# Patient Record
Sex: FEMALE | Race: BLACK OR AFRICAN AMERICAN | NOT HISPANIC OR LATINO | Employment: OTHER | URBAN - METROPOLITAN AREA
[De-identification: names, ages, dates, MRNs, and addresses within clinical notes are randomized per-mention and may not be internally consistent; named-entity substitution may affect disease eponyms.]

---

## 2019-10-09 ENCOUNTER — LAB VISIT (OUTPATIENT)
Dept: LAB | Facility: HOSPITAL | Age: 74
End: 2019-10-09
Attending: FAMILY MEDICINE
Payer: MEDICARE

## 2019-10-09 ENCOUNTER — OFFICE VISIT (OUTPATIENT)
Dept: FAMILY MEDICINE | Facility: CLINIC | Age: 74
End: 2019-10-09
Payer: MEDICARE

## 2019-10-09 VITALS
WEIGHT: 142.75 LBS | BODY MASS INDEX: 19.33 KG/M2 | TEMPERATURE: 98 F | SYSTOLIC BLOOD PRESSURE: 134 MMHG | HEART RATE: 81 BPM | DIASTOLIC BLOOD PRESSURE: 60 MMHG | OXYGEN SATURATION: 98 % | HEIGHT: 72 IN

## 2019-10-09 DIAGNOSIS — R79.9 ABNORMAL FINDING OF BLOOD CHEMISTRY, UNSPECIFIED: ICD-10-CM

## 2019-10-09 DIAGNOSIS — Z78.0 POST-MENOPAUSAL: ICD-10-CM

## 2019-10-09 DIAGNOSIS — Z85.09 HISTORY OF GALLBLADDER CANCER: Primary | ICD-10-CM

## 2019-10-09 DIAGNOSIS — Z23 NEED FOR IMMUNIZATION AGAINST INFLUENZA: ICD-10-CM

## 2019-10-09 DIAGNOSIS — Z72.0 TOBACCO ABUSE: ICD-10-CM

## 2019-10-09 DIAGNOSIS — Z12.11 ENCOUNTER FOR SCREENING FOR MALIGNANT NEOPLASM OF COLON: ICD-10-CM

## 2019-10-09 DIAGNOSIS — Z12.31 ENCOUNTER FOR SCREENING MAMMOGRAM FOR BREAST CANCER: ICD-10-CM

## 2019-10-09 DIAGNOSIS — K21.9 GASTROESOPHAGEAL REFLUX DISEASE, ESOPHAGITIS PRESENCE NOT SPECIFIED: ICD-10-CM

## 2019-10-09 DIAGNOSIS — I10 ESSENTIAL HYPERTENSION, BENIGN: ICD-10-CM

## 2019-10-09 LAB
ALBUMIN SERPL BCP-MCNC: 3.3 G/DL (ref 3.5–5.2)
ALP SERPL-CCNC: 1388 U/L (ref 55–135)
ALT SERPL W/O P-5'-P-CCNC: 383 U/L (ref 10–44)
ANION GAP SERPL CALC-SCNC: 14 MMOL/L (ref 8–16)
AST SERPL-CCNC: 340 U/L (ref 10–40)
BASOPHILS # BLD AUTO: 0.06 K/UL (ref 0–0.2)
BASOPHILS NFR BLD: 0.9 % (ref 0–1.9)
BILIRUB SERPL-MCNC: 10.3 MG/DL (ref 0.1–1)
BUN SERPL-MCNC: 10 MG/DL (ref 8–23)
CALCIUM SERPL-MCNC: 9.7 MG/DL (ref 8.7–10.5)
CHLORIDE SERPL-SCNC: 101 MMOL/L (ref 95–110)
CHOLEST SERPL-MCNC: 282 MG/DL (ref 120–199)
CHOLEST/HDLC SERPL: 5.8 {RATIO} (ref 2–5)
CO2 SERPL-SCNC: 22 MMOL/L (ref 23–29)
CREAT SERPL-MCNC: 0.7 MG/DL (ref 0.5–1.4)
DIFFERENTIAL METHOD: ABNORMAL
EOSINOPHIL # BLD AUTO: 0.1 K/UL (ref 0–0.5)
EOSINOPHIL NFR BLD: 1.3 % (ref 0–8)
ERYTHROCYTE [DISTWIDTH] IN BLOOD BY AUTOMATED COUNT: 15.3 % (ref 11.5–14.5)
EST. GFR  (AFRICAN AMERICAN): >60 ML/MIN/1.73 M^2
EST. GFR  (NON AFRICAN AMERICAN): >60 ML/MIN/1.73 M^2
ESTIMATED AVG GLUCOSE: 91 MG/DL (ref 68–131)
GLUCOSE SERPL-MCNC: 88 MG/DL (ref 70–110)
HBA1C MFR BLD HPLC: 4.8 % (ref 4–5.6)
HCT VFR BLD AUTO: 37.8 % (ref 37–48.5)
HDLC SERPL-MCNC: 49 MG/DL (ref 40–75)
HDLC SERPL: 17.4 % (ref 20–50)
HGB BLD-MCNC: 12.5 G/DL (ref 12–16)
IMM GRANULOCYTES # BLD AUTO: 0.03 K/UL (ref 0–0.04)
IMM GRANULOCYTES NFR BLD AUTO: 0.4 % (ref 0–0.5)
LDLC SERPL CALC-MCNC: 210.8 MG/DL (ref 63–159)
LYMPHOCYTES # BLD AUTO: 1.1 K/UL (ref 1–4.8)
LYMPHOCYTES NFR BLD: 16.2 % (ref 18–48)
MCH RBC QN AUTO: 30.9 PG (ref 27–31)
MCHC RBC AUTO-ENTMCNC: 33.1 G/DL (ref 32–36)
MCV RBC AUTO: 94 FL (ref 82–98)
MONOCYTES # BLD AUTO: 0.7 K/UL (ref 0.3–1)
MONOCYTES NFR BLD: 10.7 % (ref 4–15)
NEUTROPHILS # BLD AUTO: 4.8 K/UL (ref 1.8–7.7)
NEUTROPHILS NFR BLD: 70.5 % (ref 38–73)
NONHDLC SERPL-MCNC: 233 MG/DL
NRBC BLD-RTO: 0 /100 WBC
PLATELET # BLD AUTO: 289 K/UL (ref 150–350)
PMV BLD AUTO: 11.2 FL (ref 9.2–12.9)
POTASSIUM SERPL-SCNC: 3.9 MMOL/L (ref 3.5–5.1)
PROT SERPL-MCNC: 7.3 G/DL (ref 6–8.4)
RBC # BLD AUTO: 4.04 M/UL (ref 4–5.4)
SODIUM SERPL-SCNC: 137 MMOL/L (ref 136–145)
T4 FREE SERPL-MCNC: 1.16 NG/DL (ref 0.71–1.51)
TRIGL SERPL-MCNC: 111 MG/DL (ref 30–150)
TSH SERPL DL<=0.005 MIU/L-ACNC: 1.67 UIU/ML (ref 0.4–4)
WBC # BLD AUTO: 6.85 K/UL (ref 3.9–12.7)

## 2019-10-09 PROCEDURE — 80053 COMPREHEN METABOLIC PANEL: CPT

## 2019-10-09 PROCEDURE — 99999 PR PBB SHADOW E&M-NEW PATIENT-LVL IV: ICD-10-PCS | Mod: PBBFAC,,, | Performed by: FAMILY MEDICINE

## 2019-10-09 PROCEDURE — 1101F PT FALLS ASSESS-DOCD LE1/YR: CPT | Mod: CPTII,S$GLB,, | Performed by: FAMILY MEDICINE

## 2019-10-09 PROCEDURE — 90662 IIV NO PRSV INCREASED AG IM: CPT | Mod: S$GLB,,, | Performed by: FAMILY MEDICINE

## 2019-10-09 PROCEDURE — 99203 OFFICE O/P NEW LOW 30 MIN: CPT | Mod: 25,S$GLB,, | Performed by: FAMILY MEDICINE

## 2019-10-09 PROCEDURE — 84443 ASSAY THYROID STIM HORMONE: CPT

## 2019-10-09 PROCEDURE — 36415 COLL VENOUS BLD VENIPUNCTURE: CPT | Mod: PO

## 2019-10-09 PROCEDURE — 90662 FLU VACCINE - HIGH DOSE (65+) PRESERVATIVE FREE IM: ICD-10-PCS | Mod: S$GLB,,, | Performed by: FAMILY MEDICINE

## 2019-10-09 PROCEDURE — G0008 ADMIN INFLUENZA VIRUS VAC: HCPCS | Mod: S$GLB,,, | Performed by: FAMILY MEDICINE

## 2019-10-09 PROCEDURE — 1101F PR PT FALLS ASSESS DOC 0-1 FALLS W/OUT INJ PAST YR: ICD-10-PCS | Mod: CPTII,S$GLB,, | Performed by: FAMILY MEDICINE

## 2019-10-09 PROCEDURE — G0008 FLU VACCINE - HIGH DOSE (65+) PRESERVATIVE FREE IM: ICD-10-PCS | Mod: S$GLB,,, | Performed by: FAMILY MEDICINE

## 2019-10-09 PROCEDURE — 85025 COMPLETE CBC W/AUTO DIFF WBC: CPT

## 2019-10-09 PROCEDURE — 84439 ASSAY OF FREE THYROXINE: CPT

## 2019-10-09 PROCEDURE — 80061 LIPID PANEL: CPT

## 2019-10-09 PROCEDURE — 99203 PR OFFICE/OUTPT VISIT, NEW, LEVL III, 30-44 MIN: ICD-10-PCS | Mod: 25,S$GLB,, | Performed by: FAMILY MEDICINE

## 2019-10-09 PROCEDURE — 83036 HEMOGLOBIN GLYCOSYLATED A1C: CPT

## 2019-10-09 PROCEDURE — 99999 PR PBB SHADOW E&M-NEW PATIENT-LVL IV: CPT | Mod: PBBFAC,,, | Performed by: FAMILY MEDICINE

## 2019-10-09 RX ORDER — AMLODIPINE BESYLATE 10 MG/1
10 TABLET ORAL DAILY
Qty: 90 TABLET | Refills: 3 | Status: ON HOLD | OUTPATIENT
Start: 2019-10-09 | End: 2020-02-27 | Stop reason: HOSPADM

## 2019-10-09 RX ORDER — LISINOPRIL 20 MG/1
20 TABLET ORAL DAILY
Qty: 90 TABLET | Refills: 3 | Status: ON HOLD | OUTPATIENT
Start: 2019-10-09 | End: 2019-12-24

## 2019-10-09 RX ORDER — PHENOL/SODIUM PHENOLATE
1 AEROSOL, SPRAY (ML) MUCOUS MEMBRANE DAILY
Qty: 30 EACH | Refills: 3 | Status: ON HOLD | OUTPATIENT
Start: 2019-10-09 | End: 2019-10-22 | Stop reason: HOSPADM

## 2019-10-09 NOTE — PROGRESS NOTES
[facility]  Progress Note    SUBJECTIVE:     Chief Complaint   Patient presents with    Establish Care    Gastroesophageal Reflux    Medication Refill       HPI   Angelica Tomlinson  is a 74 y.o. female here to establish care and for f/u of her chronic conditions. Has heartburn issues. Which she takes omeprazole which helps. Blood pressure has been well controlled at home. Takes meds as directed. Patient has no other new complaints/problems at this time.      Review of patient's allergies indicates:  No Known Allergies    Past Medical History:   Diagnosis Date    Constipation     Cyst of breast, left, benign solitary     Diverticulosis     Helicobacter pylori gastritis     Hiatal hernia      Past Surgical History:   Procedure Laterality Date    APPENDECTOMY      colon polyps       Family History   Problem Relation Age of Onset    Pneumonia Mother      Social History     Tobacco Use    Smoking status: Light Tobacco Smoker     Packs/day: 0.50     Types: Cigarettes     Start date: 1/1/1977   Substance Use Topics    Alcohol use: No    Drug use: No        Review of Systems   Constitutional: Negative for chills, fever and malaise/fatigue.   HENT: Negative.    Respiratory: Negative.  Negative for cough and shortness of breath.    Cardiovascular: Negative.  Negative for chest pain.   Gastrointestinal: Negative.  Negative for abdominal pain, nausea and vomiting.   Genitourinary: Negative.    Neurological: Negative for weakness and headaches.   All other systems reviewed and are negative.    OBJECTIVE:     Vitals:    10/09/19 1003   BP: 134/60   Pulse: 81   Temp: 98 °F (36.7 °C)     Body mass index is 19.36 kg/m².    Physical Exam   Constitutional: She is oriented to person, place, and time and well-developed, well-nourished, and in no distress. No distress.   HENT:   Head: Normocephalic and atraumatic.   Nose: Nose normal.   Eyes: Conjunctivae and EOM are normal.   Cardiovascular: Normal rate,  regular rhythm and normal heart sounds. Exam reveals no gallop and no friction rub.   No murmur heard.  Pulmonary/Chest: Effort normal and breath sounds normal. No respiratory distress. She has no wheezes. She has no rales. She exhibits no tenderness.   Abdominal: Soft. Bowel sounds are normal. She exhibits no distension. There is no tenderness. There is no rebound.   Neurological: She is alert and oriented to person, place, and time.   Skin: Skin is warm. She is not diaphoretic.       Old records were reviewed. Labs and/or images were independently reviewed.    ASSESSMENT     1. History of gallbladder cancer    2. Gastroesophageal reflux disease, esophagitis presence not specified    3. Encounter for screening for malignant neoplasm of colon    4. Post-menopausal    5. Encounter for screening mammogram for breast cancer    6. Need for immunization against influenza    7. Tobacco abuse    8. Essential hypertension, benign    9. Abnormal finding of blood chemistry, unspecified         PLAN:     History of gallbladder cancer  -     Ambulatory referral to Hematology / Oncology for further evaluation and treatment.    Gastroesophageal reflux disease, esophagitis presence not specified  -     omeprazole 20 mg TbEC; Take 1 tablet by mouth once daily.  Dispense: 30 each; Refill: 3  -     Stable. Continue current regimen.    Encounter for screening for malignant neoplasm of colon  -     Case request GI: COLONOSCOPY    Post-menopausal  -     DXA Bone Density Spine And Hip; Future; Expected date: 10/09/2019    Encounter for screening mammogram for breast cancer  -     Mammo Digital Screening Bilat; Future; Expected date: 10/09/2019    Need for immunization against influenza  -     Influenza - High Dose (65+) (PF) (IM)    Tobacco abuse   -      Will try to quit on her own.     Essential hypertension, benign  -     CBC auto differential; Future  -     Comprehensive metabolic panel; Future  -     Hemoglobin A1c; Future  -      Lipid panel; Future  -     TSH; Future  -     T4, free; Future  -     lisinopril (PRINIVIL,ZESTRIL) 20 MG tablet; Take 1 tablet (20 mg total) by mouth once daily.  Dispense: 90 tablet; Refill: 3  -     amLODIPine (NORVASC) 10 MG tablet; Take 1 tablet (10 mg total) by mouth once daily.  Dispense: 90 tablet; Refill: 3  -     Stable. Continue current regimen.      RTC PRJHON Barroso MD  10/09/2019 5:06 PM

## 2019-10-11 DIAGNOSIS — Z11.59 NEED FOR HEPATITIS C SCREENING TEST: ICD-10-CM

## 2019-10-14 ENCOUNTER — TELEPHONE (OUTPATIENT)
Dept: FAMILY MEDICINE | Facility: CLINIC | Age: 74
End: 2019-10-14

## 2019-10-14 DIAGNOSIS — Z85.09 HISTORY OF GALLBLADDER CANCER: Primary | ICD-10-CM

## 2019-10-14 DIAGNOSIS — R74.8 ELEVATED LIVER ENZYMES: ICD-10-CM

## 2019-10-15 ENCOUNTER — TELEPHONE (OUTPATIENT)
Dept: FAMILY MEDICINE | Facility: CLINIC | Age: 74
End: 2019-10-15

## 2019-10-15 NOTE — TELEPHONE ENCOUNTER
----- Message from Miguel Barroso MD sent at 10/14/2019  4:58 PM CDT -----  Pt with elevated liver enzyme levels. Recommend follow-up with GI, which referral has been placed. cholesterol also elevated, will hold off on statin statin for now. Please decrease fatty food/cholesterol intake.

## 2019-10-16 ENCOUNTER — TELEPHONE (OUTPATIENT)
Dept: HEMATOLOGY/ONCOLOGY | Facility: CLINIC | Age: 74
End: 2019-10-16

## 2019-10-16 ENCOUNTER — TELEPHONE (OUTPATIENT)
Dept: ADMINISTRATIVE | Facility: HOSPITAL | Age: 74
End: 2019-10-16

## 2019-10-16 NOTE — TELEPHONE ENCOUNTER
Pt returned call, appt scheduled for next week. Undecided about changing oncologists, will see  then make a final decision.

## 2019-10-16 NOTE — TELEPHONE ENCOUNTER
----- Message from Miguel Barroso MD sent at 10/9/2019  5:18 PM CDT -----  Regarding: RE: Referral  Yes she is looking to transfer to [facility]. Thanks.  ----- Message -----  From: Nissa Cisneros RN  Sent: 10/9/2019   4:19 PM CDT  To: Miguel Barroso MD  Subject: Referral                                         Good afternoon,  I see that you entered a referral to hem/onc for this pt. She sees  at [facility], last visit was on 8-5-19. According to that note, she doesn't need to follow up for 6 moths. Is the pt wishing to transfer care to [facility]? Just let me know, thanks.  Rain

## 2019-10-16 NOTE — TELEPHONE ENCOUNTER
The soonest appt I could pull at at this moment was this Friday at [facility]. Patient states she was waiting on a call from Dr Barroso and she would call back to schedule after speaking with him.

## 2019-10-16 NOTE — TELEPHONE ENCOUNTER
Spoke with pt states her eyes were not yellow yesterday and today she noticed they are yellow and her urine is a dark brown color, pt states she would like to know what she can do       Please advise

## 2019-10-18 ENCOUNTER — HOSPITAL ENCOUNTER (INPATIENT)
Facility: HOSPITAL | Age: 74
LOS: 3 days | Discharge: HOME | DRG: 445 | End: 2019-10-22
Attending: INTERNAL MEDICINE | Admitting: INTERNAL MEDICINE
Payer: MEDICARE

## 2019-10-18 ENCOUNTER — OFFICE VISIT (OUTPATIENT)
Dept: GASTROENTEROLOGY | Facility: CLINIC | Age: 74
End: 2019-10-18
Payer: MEDICARE

## 2019-10-18 VITALS
SYSTOLIC BLOOD PRESSURE: 133 MMHG | BODY MASS INDEX: 19.23 KG/M2 | DIASTOLIC BLOOD PRESSURE: 63 MMHG | HEART RATE: 73 BPM | WEIGHT: 141.75 LBS

## 2019-10-18 DIAGNOSIS — D49.0 LIVER NEOPLASM: ICD-10-CM

## 2019-10-18 DIAGNOSIS — R17 JAUNDICE: ICD-10-CM

## 2019-10-18 DIAGNOSIS — Z85.09 HISTORY OF GALLBLADDER CANCER: Primary | Chronic | ICD-10-CM

## 2019-10-18 DIAGNOSIS — R74.01 TRANSAMINITIS: ICD-10-CM

## 2019-10-18 DIAGNOSIS — Z01.818 PREOP EXAMINATION: ICD-10-CM

## 2019-10-18 DIAGNOSIS — I10 HYPERTENSION, UNSPECIFIED TYPE: ICD-10-CM

## 2019-10-18 DIAGNOSIS — R97.8 OTHER ABNORMAL TUMOR MARKERS: ICD-10-CM

## 2019-10-18 DIAGNOSIS — K83.1 OBSTRUCTIVE JAUNDICE: ICD-10-CM

## 2019-10-18 PROBLEM — Z72.0 TOBACCO ABUSE: Status: RESOLVED | Noted: 2019-10-09 | Resolved: 2019-10-18

## 2019-10-18 LAB
ALBUMIN SERPL BCP-MCNC: 3.1 G/DL (ref 3.5–5.2)
ALP SERPL-CCNC: 1268 U/L (ref 55–135)
ALT SERPL W/O P-5'-P-CCNC: 150 U/L (ref 10–44)
ANION GAP SERPL CALC-SCNC: 13 MMOL/L (ref 8–16)
AST SERPL-CCNC: 122 U/L (ref 10–40)
BASOPHILS # BLD AUTO: 0.04 K/UL (ref 0–0.2)
BASOPHILS NFR BLD: 0.4 % (ref 0–1.9)
BILIRUB DIRECT SERPL-MCNC: >14 MG/DL (ref 0.1–0.3)
BILIRUB SERPL-MCNC: 20.3 MG/DL (ref 0.1–1)
BUN SERPL-MCNC: 9 MG/DL (ref 8–23)
CALCIUM SERPL-MCNC: 10.4 MG/DL (ref 8.7–10.5)
CHLORIDE SERPL-SCNC: 99 MMOL/L (ref 95–110)
CO2 SERPL-SCNC: 25 MMOL/L (ref 23–29)
CREAT SERPL-MCNC: 0.7 MG/DL (ref 0.5–1.4)
DIFFERENTIAL METHOD: ABNORMAL
EOSINOPHIL # BLD AUTO: 0.1 K/UL (ref 0–0.5)
EOSINOPHIL NFR BLD: 0.8 % (ref 0–8)
ERYTHROCYTE [DISTWIDTH] IN BLOOD BY AUTOMATED COUNT: 14.9 % (ref 11.5–14.5)
EST. GFR  (AFRICAN AMERICAN): >60 ML/MIN/1.73 M^2
EST. GFR  (NON AFRICAN AMERICAN): >60 ML/MIN/1.73 M^2
GGT SERPL-CCNC: 1610 U/L (ref 8–55)
GLUCOSE SERPL-MCNC: 89 MG/DL (ref 70–110)
HCT VFR BLD AUTO: 34.7 % (ref 37–48.5)
HGB BLD-MCNC: 11.9 G/DL (ref 12–16)
INR PPP: 1.1 (ref 0.8–1.2)
LYMPHOCYTES # BLD AUTO: 1.8 K/UL (ref 1–4.8)
LYMPHOCYTES NFR BLD: 19.7 % (ref 18–48)
MAGNESIUM SERPL-MCNC: 1.7 MG/DL (ref 1.6–2.6)
MCH RBC QN AUTO: 31 PG (ref 27–31)
MCHC RBC AUTO-ENTMCNC: 34.3 G/DL (ref 32–36)
MCV RBC AUTO: 90 FL (ref 82–98)
MONOCYTES # BLD AUTO: 0.9 K/UL (ref 0.3–1)
MONOCYTES NFR BLD: 9.3 % (ref 4–15)
NEUTROPHILS # BLD AUTO: 6.3 K/UL (ref 1.8–7.7)
NEUTROPHILS NFR BLD: 69.8 % (ref 38–73)
PHOSPHATE SERPL-MCNC: 3.8 MG/DL (ref 2.7–4.5)
PLATELET # BLD AUTO: 416 K/UL (ref 150–350)
PMV BLD AUTO: 9.9 FL (ref 9.2–12.9)
POTASSIUM SERPL-SCNC: 4.4 MMOL/L (ref 3.5–5.1)
PROT SERPL-MCNC: 7.4 G/DL (ref 6–8.4)
PROTHROMBIN TIME: 11.1 SEC (ref 9–12.5)
RBC # BLD AUTO: 3.84 M/UL (ref 4–5.4)
SODIUM SERPL-SCNC: 137 MMOL/L (ref 136–145)
WBC # BLD AUTO: 9.1 K/UL (ref 3.9–12.7)

## 2019-10-18 PROCEDURE — 84100 ASSAY OF PHOSPHORUS: CPT

## 2019-10-18 PROCEDURE — 85610 PROTHROMBIN TIME: CPT

## 2019-10-18 PROCEDURE — 82977 ASSAY OF GGT: CPT

## 2019-10-18 PROCEDURE — 1101F PR PT FALLS ASSESS DOC 0-1 FALLS W/OUT INJ PAST YR: ICD-10-PCS | Mod: CPTII,S$GLB,, | Performed by: INTERNAL MEDICINE

## 2019-10-18 PROCEDURE — 99999 PR PBB SHADOW E&M-EST. PATIENT-LVL III: ICD-10-PCS | Mod: PBBFAC,,, | Performed by: INTERNAL MEDICINE

## 2019-10-18 PROCEDURE — 83735 ASSAY OF MAGNESIUM: CPT

## 2019-10-18 PROCEDURE — 99205 OFFICE O/P NEW HI 60 MIN: CPT | Mod: S$GLB,,, | Performed by: INTERNAL MEDICINE

## 2019-10-18 PROCEDURE — 25000003 PHARM REV CODE 250: Performed by: STUDENT IN AN ORGANIZED HEALTH CARE EDUCATION/TRAINING PROGRAM

## 2019-10-18 PROCEDURE — 99205 PR OFFICE/OUTPT VISIT, NEW, LEVL V, 60-74 MIN: ICD-10-PCS | Mod: S$GLB,,, | Performed by: INTERNAL MEDICINE

## 2019-10-18 PROCEDURE — 99999 PR PBB SHADOW E&M-EST. PATIENT-LVL III: CPT | Mod: PBBFAC,,, | Performed by: INTERNAL MEDICINE

## 2019-10-18 PROCEDURE — G0379 DIRECT REFER HOSPITAL OBSERV: HCPCS

## 2019-10-18 PROCEDURE — 36415 COLL VENOUS BLD VENIPUNCTURE: CPT

## 2019-10-18 PROCEDURE — 85025 COMPLETE CBC W/AUTO DIFF WBC: CPT

## 2019-10-18 PROCEDURE — 82248 BILIRUBIN DIRECT: CPT

## 2019-10-18 PROCEDURE — 1101F PT FALLS ASSESS-DOCD LE1/YR: CPT | Mod: CPTII,S$GLB,, | Performed by: INTERNAL MEDICINE

## 2019-10-18 PROCEDURE — G0378 HOSPITAL OBSERVATION PER HR: HCPCS

## 2019-10-18 PROCEDURE — 80053 COMPREHEN METABOLIC PANEL: CPT

## 2019-10-18 RX ORDER — AMLODIPINE BESYLATE 5 MG/1
10 TABLET ORAL DAILY
Status: DISCONTINUED | OUTPATIENT
Start: 2019-10-18 | End: 2019-10-22 | Stop reason: HOSPADM

## 2019-10-18 RX ORDER — LISINOPRIL 20 MG/1
20 TABLET ORAL DAILY
Status: DISCONTINUED | OUTPATIENT
Start: 2019-10-18 | End: 2019-10-22 | Stop reason: HOSPADM

## 2019-10-18 RX ORDER — PANTOPRAZOLE SODIUM 40 MG/1
40 TABLET, DELAYED RELEASE ORAL DAILY
Status: DISCONTINUED | OUTPATIENT
Start: 2019-10-18 | End: 2019-10-22 | Stop reason: HOSPADM

## 2019-10-18 RX ORDER — ENOXAPARIN SODIUM 100 MG/ML
40 INJECTION SUBCUTANEOUS EVERY 24 HOURS
Status: DISCONTINUED | OUTPATIENT
Start: 2019-10-18 | End: 2019-10-20

## 2019-10-18 RX ORDER — NAPROXEN SODIUM 220 MG/1
81 TABLET, FILM COATED ORAL ONCE
Status: DISCONTINUED | OUTPATIENT
Start: 2019-10-18 | End: 2019-10-22 | Stop reason: HOSPADM

## 2019-10-18 RX ORDER — DOCUSATE SODIUM 100 MG/1
100 CAPSULE, LIQUID FILLED ORAL 2 TIMES DAILY
Status: DISCONTINUED | OUTPATIENT
Start: 2019-10-18 | End: 2019-10-22 | Stop reason: HOSPADM

## 2019-10-18 RX ORDER — SODIUM CHLORIDE 0.9 % (FLUSH) 0.9 %
10 SYRINGE (ML) INJECTION
Status: DISCONTINUED | OUTPATIENT
Start: 2019-10-18 | End: 2019-10-22 | Stop reason: HOSPADM

## 2019-10-18 RX ORDER — DIPHENHYDRAMINE HCL 25 MG
25 CAPSULE ORAL EVERY 6 HOURS PRN
Status: DISCONTINUED | OUTPATIENT
Start: 2019-10-18 | End: 2019-10-19

## 2019-10-18 RX ORDER — CHOLECALCIFEROL (VITAMIN D3) 25 MCG
1000 TABLET ORAL DAILY
Status: DISCONTINUED | OUTPATIENT
Start: 2019-10-18 | End: 2019-10-22 | Stop reason: HOSPADM

## 2019-10-18 RX ADMIN — PANTOPRAZOLE SODIUM 40 MG: 40 TABLET, DELAYED RELEASE ORAL at 10:10

## 2019-10-18 RX ADMIN — DIPHENHYDRAMINE HYDROCHLORIDE 25 MG: 25 CAPSULE ORAL at 10:10

## 2019-10-18 RX ADMIN — DOCUSATE SODIUM 100 MG: 100 CAPSULE, LIQUID FILLED ORAL at 09:10

## 2019-10-18 NOTE — H&P
[facility] Medicine H&P Note     Admitting Team: [facility] [place] [facility] Hospitalist Team B  Attending Physician: Josue Moreland MD  Resident: Dru Hurd DO  Intern: [place] Beach MD    Date of Admit: 10/18/2019    Chief Complaint     Painless Jaundice and hyperbilirubinemia for 1 week    Subjective:      History of Present Illness:  Angelica Tomlinson is a 74 y.o. 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 The patient presented to [facility] on 10/18/2019 as a direct admission from [facility] with a primary complaint of painless jaundice and hyperbilirubinemia.    The patient was in their usual state of health until 1 week ago. Patient reports increasing painless jaundice of her eyes and arms for the past week. She notes her eyes are darker, her urine is darker, and her stools have become lighter beige color. Patient complains of gnawing abdominal pain in epigastric area but denies nausea or vomiting. Patient also reports decreased appetite and 9 lb weight loss in the past 2-3 weeks.     Patient has a history of locally advanced stage IIIB gallbladder cancer diagnosed initially in 2016. Patient was sent for resection however given perceived lymph node involvement Whipple was canceled. She was subsequently treated with chemo radiation. Patient recently established care with PCP who checked labs and found her alk-phos to be elevated in addition to her bilirubin. Given new findings she was referred to GI for further evaluation today and direct admitted from GI clinic. Patient had been in remission according to patient and had not had chemotherapy or radiation in over 6 months.    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 Authorizing Provider   amLODIPine (NORVASC) 10 MG tablet Take 1 tablet (10 mg total) by mouth once daily. 10/9/19  Yes Miguel Barroso MD   aspirin 81 MG Chew  2/26/16  Yes Historical Provider, MD   docusate sodium (COLACE) 100 MG capsule Take 1 capsule (100 mg total) by mouth 2 (two) times daily. 5/18/16  Yes Connie Mariscal NP   lisinopril (PRINIVIL,ZESTRIL) 20 MG tablet Take 1 tablet (20 mg total) by mouth once daily. 10/9/19  Yes Miguel Barroso MD   omeprazole 20 mg TbEC Take 1 tablet by mouth once daily.  Patient taking differently: Take 1 tablet by mouth as needed.  10/9/19 10/8/20 Yes Miguel Barroso MD   vitamin D 1000 units Tab Take 185 mg by mouth once daily.   Yes Historical Provider, MD     Family History:  Family History   Problem Relation Age of Onset    Pneumonia Mother      Social History:  Social History     Tobacco Use    Smoking status: Light Tobacco Smoker     Packs/day: 0.50     Types: Cigarettes     Start date: 1/1/1977   Substance Use Topics    Alcohol use: No    Drug use: No     Review of Systems:  Review of Systems   Constitutional: Positive for chills, diaphoresis, malaise/fatigue and weight loss. Negative for fever.   HENT: Negative for sore throat.    Respiratory: Negative for cough, hemoptysis, sputum production, shortness of breath and wheezing.    Cardiovascular: Negative for chest pain, palpitations and leg swelling.   Gastrointestinal: Positive for abdominal pain. Negative for blood in stool, constipation, diarrhea, heartburn, melena, nausea  "and vomiting.        Beige stools   Genitourinary: Negative for dysuria and urgency.        Dark urine   Musculoskeletal: Negative for back pain, falls and myalgias.   Skin: Negative for itching and rash.   Neurological: Negative for dizziness, weakness and headaches.   Psychiatric/Behavioral: The patient does not have insomnia.    All other systems are reviewed and are negative.    Health Maintaince :   Primary Care Physician: Miguel Barroso MD    Immunizations:   TDap, Flu, Pna not up to date    Cancer Screening:  PAP: not indicated   MMG: scheduled for this month  Colonoscopy: scheduled for this month, previously had polyps removed per patient     Objective:   Last 24 Hour Vital Signs:  BP  Min: 133/63  Max: 163/70  Temp  Av.3 °F (36.3 °C)  Min: 97.3 °F (36.3 °C)  Max: 97.3 °F (36.3 °C)  Pulse  Av  Min: 73  Max: 77  Resp  Av  Min: 18  Max: 18  SpO2  Av %  Min: 99 %  Max: 99 %  Height  Av' 1" (185.4 cm)  Min: 6' 1" (185.4 cm)  Max: 6' 1" (185.4 cm)  Weight  Av.3 kg (141 lb 12.1 oz)  Min: 64.3 kg (141 lb 12.1 oz)  Max: 64.3 kg (141 lb 12.1 oz)  Body mass index is 18.7 kg/m².  No intake/output data recorded.    Physical Examination:  General: A&Ox3, well-developed and well-nourished.   HENT:   Head: Normocephalic and atraumatic.   Eyes: PERRL. EOMI. Scleral icterus is present.   Neck: Normal range of motion. Neck supple.   Cardio: Normal rate, regular rhythm, normal heart sounds and intact distal pulses.   Pulm: Effort normal and breath sounds normal. No respiratory distress.   Abdominal: Soft, BS normal, no distension, mild tenderness RUQ and epigastric region, hepatomegaly 5cm below costal margin.   Musculoskeletal: Normal range of motion. She exhibits no edema.   Neurological: A&Ox3  No asterixis   Skin: Skin is warm and dry. No rash noted. No erythema.   Psychiatric: She has a normal mood and affect. Her behavior is normal.     Laboratory:  Most Recent Data:  CBC:   Lab Results "   Component Value Date    WBC 9.10 10/18/2019    HGB 11.9 (L) 10/18/2019    HCT 34.7 (L) 10/18/2019     (H) 10/18/2019    MCV 90 10/18/2019    RDW 14.9 (H) 10/18/2019     BMP:   Lab Results   Component Value Date     10/09/2019    K 3.9 10/09/2019     10/09/2019    CO2 22 (L) 10/09/2019    BUN 10 10/09/2019    CREATININE 0.7 10/09/2019    GLU 88 10/09/2019    CALCIUM 9.7 10/09/2019    MG 2.0 05/18/2016    PHOS 2.8 05/18/2016     LFTs:   Lab Results   Component Value Date    PROT 7.3 10/09/2019    ALBUMIN 3.3 (L) 10/09/2019    BILITOT 10.3 (H) 10/09/2019     (H) 10/09/2019    ALKPHOS 1,388 (H) 10/09/2019     (H) 10/09/2019     FLP:   Lab Results   Component Value Date    CHOL 282 (H) 10/09/2019    HDL 49 10/09/2019    LDLCALC 210.8 (H) 10/09/2019    TRIG 111 10/09/2019    CHOLHDL 17.4 (L) 10/09/2019     DM:   Lab Results   Component Value Date    HGBA1C 4.8 10/09/2019    LDLCALC 210.8 (H) 10/09/2019    CREATININE 0.7 10/09/2019     Thyroid:   Lab Results   Component Value Date    TSH 1.670 10/09/2019    FREET4 1.16 10/09/2019     Trended Lab Data:  Recent Labs   Lab 10/18/19  1535   WBC 9.10   HGB 11.9*   HCT 34.7*   *   MCV 90   RDW 14.9*     Radiology:  MRI MRCP wo contrast today    Assessment:     Angelica Tomlinson is a 74 y.o. female with:  Patient Active Problem List    Diagnosis Date Noted    Transaminitis 10/18/2019    Jaundice 10/18/2019    HTN (hypertension) 10/18/2019    Obstructive jaundice 10/18/2019    History of gallbladder cancer 10/09/2019    Hiatal hernia      Plan:     Painless Jaundice with history of locally advanced stage IIIB gallbladder cancer  -MRI/MRCP today, GI already consulted  -Trend CMP; direct bilirubin, GGT ordered  -10/9/19 Bilirubin 10.3, ALP 1388, ,   -admitted for further workup and likely that cancer has returned and will need to resume treatment    Transaminitis  -10/9 ,     Hyperbilirubinemia  -10/9  bilirubin 10.3  -will reorder and trend Tbili and direct bilirubin    Hypertension  -/70 on arrival  -continued home lisinopril and amlodipine, will continue to monitor    Constipation  -continued home colace, continue to monitor    Vitamin D deficiency  -continued home Vitamin D    Diet: NPO for MRI/MRCP, regular diet after  Code: Full  PPx: lovenox  Dispo: admitted for MRI/MRCP and workup of jaundice and hyperbilirubinemia    Marin Beach MD  [facility] HO-1    [facility] Hospitalist Pager numbers:   [facility] Hospitalist Medicine Team A (Pilar/Marky): [phone]  [facility] Hospitalist Medicine Team B (Gabby/Aniceto):  [phone]

## 2019-10-18 NOTE — LETTER
October 18, 2019      Miguel Barroso MD  [address]  [place] [address]           [facility]  [address]  [facility] [address]  Phone: [phone]          Patient: Angelica Tomlinson   MR Number: [id number]   YOB: 1945   Date of Visit: 10/18/2019       Dear Dr. Miguel Barroso:    Thank you for referring Angelica Tomlinson to me for evaluation. Attached you will find relevant portions of my assessment and plan of care.    If you have questions, please do not hesitate to call me. I look forward to following Angelica Tomlinson along with you.    Sincerely,    Antwon Holman MD    Enclosure  CC:  No Recipients    If you would like to receive this communication electronically, please contact [email or web address] or [phone] to request more information on [employer] Link access.    For providers and/or their staff who would like to refer a patient to [facility], please contact us through our one-stop-shop provider referral line, [facility], at [phone].    If you feel you have received this communication in error or would no longer like to receive these types of communications, please e-mail [email or web address]

## 2019-10-19 LAB
ALBUMIN SERPL BCP-MCNC: 2.5 G/DL (ref 3.5–5.2)
ALP SERPL-CCNC: 1052 U/L (ref 55–135)
ALT SERPL W/O P-5'-P-CCNC: 112 U/L (ref 10–44)
ANION GAP SERPL CALC-SCNC: 9 MMOL/L (ref 8–16)
AST SERPL-CCNC: 91 U/L (ref 10–40)
BASOPHILS # BLD AUTO: 0.05 K/UL (ref 0–0.2)
BASOPHILS NFR BLD: 0.7 % (ref 0–1.9)
BILIRUB SERPL-MCNC: 17 MG/DL (ref 0.1–1)
BUN SERPL-MCNC: 11 MG/DL (ref 8–23)
CALCIUM SERPL-MCNC: 9.4 MG/DL (ref 8.7–10.5)
CHLORIDE SERPL-SCNC: 101 MMOL/L (ref 95–110)
CO2 SERPL-SCNC: 25 MMOL/L (ref 23–29)
CREAT SERPL-MCNC: 0.7 MG/DL (ref 0.5–1.4)
DIFFERENTIAL METHOD: ABNORMAL
EOSINOPHIL # BLD AUTO: 0.1 K/UL (ref 0–0.5)
EOSINOPHIL NFR BLD: 1.3 % (ref 0–8)
ERYTHROCYTE [DISTWIDTH] IN BLOOD BY AUTOMATED COUNT: 14.9 % (ref 11.5–14.5)
EST. GFR  (AFRICAN AMERICAN): >60 ML/MIN/1.73 M^2
EST. GFR  (NON AFRICAN AMERICAN): >60 ML/MIN/1.73 M^2
GLUCOSE SERPL-MCNC: 92 MG/DL (ref 70–110)
HCT VFR BLD AUTO: 30.6 % (ref 37–48.5)
HGB BLD-MCNC: 10.5 G/DL (ref 12–16)
LYMPHOCYTES # BLD AUTO: 1.3 K/UL (ref 1–4.8)
LYMPHOCYTES NFR BLD: 17.7 % (ref 18–48)
MAGNESIUM SERPL-MCNC: 1.7 MG/DL (ref 1.6–2.6)
MCH RBC QN AUTO: 30.7 PG (ref 27–31)
MCHC RBC AUTO-ENTMCNC: 34.3 G/DL (ref 32–36)
MCV RBC AUTO: 90 FL (ref 82–98)
MONOCYTES # BLD AUTO: 0.8 K/UL (ref 0.3–1)
MONOCYTES NFR BLD: 11.5 % (ref 4–15)
NEUTROPHILS # BLD AUTO: 4.9 K/UL (ref 1.8–7.7)
NEUTROPHILS NFR BLD: 68.8 % (ref 38–73)
PHOSPHATE SERPL-MCNC: 3.9 MG/DL (ref 2.7–4.5)
PLATELET # BLD AUTO: 363 K/UL (ref 150–350)
PMV BLD AUTO: 10 FL (ref 9.2–12.9)
POTASSIUM SERPL-SCNC: 4.2 MMOL/L (ref 3.5–5.1)
PROT SERPL-MCNC: 6.2 G/DL (ref 6–8.4)
RBC # BLD AUTO: 3.42 M/UL (ref 4–5.4)
SODIUM SERPL-SCNC: 135 MMOL/L (ref 136–145)
WBC # BLD AUTO: 7.07 K/UL (ref 3.9–12.7)

## 2019-10-19 PROCEDURE — 25000003 PHARM REV CODE 250: Performed by: STUDENT IN AN ORGANIZED HEALTH CARE EDUCATION/TRAINING PROGRAM

## 2019-10-19 PROCEDURE — 96374 THER/PROPH/DIAG INJ IV PUSH: CPT

## 2019-10-19 PROCEDURE — 36415 COLL VENOUS BLD VENIPUNCTURE: CPT

## 2019-10-19 PROCEDURE — 85025 COMPLETE CBC W/AUTO DIFF WBC: CPT

## 2019-10-19 PROCEDURE — 11000001 HC ACUTE MED/SURG PRIVATE ROOM

## 2019-10-19 PROCEDURE — 83735 ASSAY OF MAGNESIUM: CPT

## 2019-10-19 PROCEDURE — 80053 COMPREHEN METABOLIC PANEL: CPT

## 2019-10-19 PROCEDURE — 84100 ASSAY OF PHOSPHORUS: CPT

## 2019-10-19 PROCEDURE — 63600175 PHARM REV CODE 636 W HCPCS: Performed by: STUDENT IN AN ORGANIZED HEALTH CARE EDUCATION/TRAINING PROGRAM

## 2019-10-19 RX ORDER — CHOLESTYRAMINE 4 G/9G
1 POWDER, FOR SUSPENSION ORAL 2 TIMES DAILY
Status: DISCONTINUED | OUTPATIENT
Start: 2019-10-19 | End: 2019-10-22 | Stop reason: HOSPADM

## 2019-10-19 RX ORDER — HYDROXYZINE HYDROCHLORIDE 25 MG/1
25 TABLET, FILM COATED ORAL 3 TIMES DAILY PRN
Status: DISCONTINUED | OUTPATIENT
Start: 2019-10-19 | End: 2019-10-22 | Stop reason: HOSPADM

## 2019-10-19 RX ADMIN — PANTOPRAZOLE SODIUM 40 MG: 40 TABLET, DELAYED RELEASE ORAL at 10:10

## 2019-10-19 RX ADMIN — DOCUSATE SODIUM 100 MG: 100 CAPSULE, LIQUID FILLED ORAL at 09:10

## 2019-10-19 RX ADMIN — VITAMIN D, TAB 1000IU (100/BT) 1000 UNITS: 25 TAB at 10:10

## 2019-10-19 RX ADMIN — PIPERACILLIN AND TAZOBACTAM 4.5 G: 4; .5 INJECTION, POWDER, LYOPHILIZED, FOR SOLUTION INTRAVENOUS; PARENTERAL at 09:10

## 2019-10-19 RX ADMIN — CHOLESTYRAMINE 4 G: 4 POWDER, FOR SUSPENSION ORAL at 09:10

## 2019-10-19 RX ADMIN — PIPERACILLIN AND TAZOBACTAM 4.5 G: 4; .5 INJECTION, POWDER, LYOPHILIZED, FOR SOLUTION INTRAVENOUS; PARENTERAL at 10:10

## 2019-10-19 RX ADMIN — AMLODIPINE BESYLATE 10 MG: 5 TABLET ORAL at 10:10

## 2019-10-19 RX ADMIN — ENOXAPARIN SODIUM 40 MG: 100 INJECTION SUBCUTANEOUS at 05:10

## 2019-10-19 RX ADMIN — LISINOPRIL 20 MG: 20 TABLET ORAL at 10:10

## 2019-10-19 RX ADMIN — DOCUSATE SODIUM 100 MG: 100 CAPSULE, LIQUID FILLED ORAL at 10:10

## 2019-10-19 RX ADMIN — CHOLESTYRAMINE 4 G: 4 POWDER, FOR SUSPENSION ORAL at 10:10

## 2019-10-19 NOTE — H&P (VIEW-ONLY)
[facility] Consult Note    CC: jaundice     HPI: 74 y.o. female with PMH constipation, diverticulosis, H pylori, locally advanced gallbladder malignancy who presents with acute, ongoing, painless jaundice associated with dark urine and beige stools. Patient started to notice changes in urine and stools about 2 weeks ago, and then subsequently noticed yellowing of eyes and skin. She was seen by pcp and labs were checked. After abnormal labs found, she was referred to GI clinic. She was seen in the GI clinic today and was sent to ED.   In regards to her gallbladder malignancy- diagnosed in 2016, planned for a whipple but was not completed due to possible metastasis to lymph nodes. She was treated with chemo + radiation. Per patient, she completed chemotherapy about a year ago.      Denies abdominal pain, no nausea or vomiting. No night sweats. Endorses 8-10 lbs of weight loss and poor appetite.     Previous medical records reviewed and summarized above.     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    Past Surgical History   has a past surgical history that includes Appendectomy and colon polyps.    Social History  Social History     Tobacco Use    Smoking status: Light Tobacco Smoker     Packs/day: 0.50     Types: Cigarettes     Start date: 1/1/1977   Substance Use Topics    Alcohol use: No    Drug use: No     Family History  Family History   Problem Relation Age of Onset    Pneumonia Mother      Review of Systems  General ROS: negative for chills, fever. + weight loss. +jaundice  Psychological ROS: negative for hallucination, depression or suicidal ideation  Ophthalmic ROS: negative for blurry vision, photophobia or eye pain  ENT ROS: negative for epistaxis, sore throat or rhinorrhea  Respiratory ROS: no cough, shortness  "of breath, or wheezing  Cardiovascular ROS: no chest pain or dyspnea on exertion  Gastrointestinal ROS: no abdominal pain, change in bowel habits, or black/ bloody stools.   Genito-Urinary ROS: no dysuria, trouble voiding, or hematuria  Musculoskeletal ROS: negative for gait disturbance or muscular weakness  Neurological ROS: no syncope or seizures; no ataxia  Dermatological ROS: + pruritis; no rash and jaundice    Physical Examination  BP (!) 168/70 (Patient Position: Lying)   Pulse 64   Temp 98.5 °F (36.9 °C) (Oral)   Resp 18   Ht 6' 1" (1.854 m)   Wt 64.3 kg (141 lb 12.1 oz)   SpO2 95%   Breastfeeding? No   BMI 18.70 kg/m²     General appearance: alert, cooperative, no distress, jaundice  HENT: Normocephalic, atraumatic, neck symmetrical, no nasal discharge   Eyes: conjunctivae/corneas clear, PERRL, EOM's intact  Lungs: clear to auscultation in all fields, no dullness to percussion bilaterally, no wheeze  Heart: normal rate, regular rhythm without rub; palpable peripheral pulses  Abdomen: soft, non-tender; non-distended; bowel sounds normoactive; no organomegaly  Extremities: extremities symmetric; no clubbing, cyanosis, or edema  Integument: Skin color, texture, turgor normal; no rashes; hair distrubution normal  Neurologic: Alert and oriented X 3, normal strength, normal coordination  Psychiatric: no pressured speech; normal affect; no evidence of impaired cognition     Labs:  Recent Labs   Lab 10/18/19  1535      K 4.4   CL 99   CO2 25   GLU 89   BUN 9   CREATININE 0.7     Recent Labs   Lab 10/18/19  1535   PROT 7.4   ALBUMIN 3.1*   BILITOT 20.3*   *   *   ALKPHOS 1,268*     Recent Labs   Lab 10/18/19  1535   WBC 9.10   RBC 3.84*   HGB 11.9*   HCT 34.7*   *   MCV 90   MCH 31.0   MCHC 34.3     Imaging:  MRCP pending ; will follow up     Assessment:   75 yo F with history of gallbladder malignancy s/p chemo and radiation with new, worsening obstructive jaundice with associated " changes in liver enzymes. She endorses some wt loss and changes in urine, stools, skin and eyes, but declines abdominal pain, nausea, vomiting. WBC normal, no fever; no cholangitis. . Liver changes likely related to metastatic disease or recurrent local malignancy. Will obtain imaging to further delineate.     Plan:  -agree with MRCP; will follow up results  -would give ppx antibiotics with FQ + flagyl or zosyn while inpatient   -consider antihistamines for pruritis, if no improvement can use cholestyramine   -will continue to monitor for endoscopic intervention     Thank you for the consult. Please call with questions.     Omari Wray MD  [facility]   [phone]

## 2019-10-19 NOTE — PROGRESS NOTES
"[facility] Resident HO-1 Progress Note    Subjective:      Angelica Tomlinson is a 74 y.o. female who is being followed by the [facility] service at [facility] for jaundice and hyperbilirubinemia.     Patient doing well overnight. Patient having pruritis. Denies nausea, vomiting, diarrhea, abdominal pain.     Objective:   Last 24 Hour Vital Signs:  BP  Min: 113/56  Max: 168/70  Temp  Av.9 °F (36.6 °C)  Min: 97.2 °F (36.2 °C)  Max: 98.7 °F (37.1 °C)  Pulse  Av.4  Min: 64  Max: 77  Resp  Av  Min: 18  Max: 18  SpO2  Av.8 %  Min: 95 %  Max: 99 %  Height  Av' 1" (185.4 cm)  Min: 6' 1" (185.4 cm)  Max: 6' 1" (185.4 cm)  Weight  Av.4 kg (142 lb 0.8 oz)  Min: 64.3 kg (141 lb 12.1 oz)  Max: 64.7 kg (142 lb 10.2 oz)  I/O last 3 completed shifts:  In: 226 [P.O.:226]  Out: -     Physical Examination:  General: alert, cooperative, jaundice  HENT: Normocephalic, atraumatic, neck symmetrical, no nasal discharge   Eyes: conjunctivae/corneas clear, PERRL, EOM's intact  Cardio: normal rate, regular rhythm without rub; palpable peripheral pulses  Lungs: clear to auscultation in all fields, no dullness to percussion bilaterally, no wheeze  Abdomen: soft, non-tender; non-distended; bowel sounds normoactive; no organomegaly  Extremities: extremities symmetric; no clubbing, cyanosis, or edema  Skin: Skin color, texture, turgor normal; no rashes; hair distrubution normal  Neurologic: A&Ox3, normal strength, normal coordination  Psychiatric: no pressured speech; normal affect; no evidence of impaired cognition     Laboratory:  Recent Labs   Lab 10/18/19  1535 10/19/19  0544   WBC 9.10 7.07   HGB 11.9* 10.5*   HCT 34.7* 30.6*   * 363*    135*   K 4.4 4.2   CL 99 101   CREATININE 0.7 0.7   BUN 9 11   CO2 25 25   * 112*   * 91*     Radiology:  MRI/MRCP pending read    Current Medications:     Scheduled:   amLODIPine  10 mg Oral Daily    aspirin  81 mg Oral Once    docusate " sodium  100 mg Oral BID    enoxaparin  40 mg Subcutaneous Daily    lisinopril  20 mg Oral Daily    pantoprazole  40 mg Oral Daily    vitamin D  1,000 Units Oral Daily        PRN:  diphenhydrAMINE, sodium chloride 0.9%    Assessment:     Angelica Tomlinson is a 74 y.o.female with  Patient Active Problem List    Diagnosis Date Noted    Transaminitis 10/18/2019    Jaundice 10/18/2019    HTN (hypertension) 10/18/2019    Obstructive jaundice 10/18/2019    History of gallbladder cancer 10/09/2019    Hiatal hernia      Plan:     Painless Jaundice with history of locally advanced stage IIIB gallbladder cancer  -10/9/19 Bilirubin 10.3, ALP 1388, ,   -MRI/MRCP yesterday pending read, GI following  -Tbili 20.3>17; direct bilirubin >14  -admitted for further workup and likely that cancer has returned and will need to resume treatment  -NPO pending further endoscopy today, added PPx zosyn, PRN benadryl, cholestyramine for pruritis     Transaminitis  -10/9 ,   ->91; >112  -ALP 1268>1052  -GGT 1610    Hyperbilirubinemia  -10/9 bilirubin 10.3  -Tbili 20.3>17  -Dbili>14     Hypertension  -/70 on arrival  -continued home lisinopril and amlodipine, will continue to monitor    Normocytic Anemia  -Hgb 11.9>10.5; MCV 90  -continue to monitor; transfuse hgb<7    Thrombocytopenia  -mildly elevated 416> 363 today, continue to monitor    Constipation  -continued home colace, continue to monitor     Vitamin D deficiency  -continued home Vitamin D     Diet: NPO for any further endoscopy today, regular diet after  Code: Full  PPx: lovenox  Dispo: admitted for MRI/MRCP and workup of jaundice and hyperbilirubinemia, pending further GI evaluation and MRCP read    Marin Beach MD  [facility] HO-1  [facility] Hospitalist Team B     [facility] Medicine Hospitalist Pager numbers:   [facility] Hospitalist Medicine Team A (Pilar/Marky): [phone]  [facility] Hospitalist Medicine Team B (Gabby/Aniceto):   [phone]

## 2019-10-19 NOTE — PROGRESS NOTES
"[facility] Progress Note    CC: jaundice    Interval History: No issues overnight. Feeling okay this morning. No abdominal pain, nausea, vomiting.   MRCP completed yesterday evening.     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    Review of Systems  General ROS: negative for - chills, fever or weight loss. +jaundice, scleral icterus  Cardiovascular ROS: no chest pain or dyspnea on exertion  Gastrointestinal ROS: no abdominal pain, change in bowel habits, or black/ bloody stools    Physical Examination  BP (!) 108/52 (BP Location: Left arm, Patient Position: Lying)   Pulse 67   Temp 98.6 °F (37 °C) (Oral)   Resp 20   Ht 6' 1" (1.854 m)   Wt 64.7 kg (142 lb 10.2 oz)   SpO2 96%   Breastfeeding? No   BMI 18.82 kg/m²   General appearance: alert, cooperative, no distress, +jaundice  HEENT: Normocephalic, atraumatic, neck symmetrical, no nasal discharge, +sclerlal icterus  Lungs: clear to auscultation in all fields, symmetric chest wall expansion bilaterally, no wheeze, rale, or rhonchi  Heart: normal rate, regular rhythm without rub; palpable peripheral pulses  Abdomen: soft, non-tender; bowel sounds normoactive; no organomegaly  Extremities: extremities symmetric; no clubbing, cyanosis, or edema  Neurologic: Alert and oriented X 3, normal strength, normal coordination    Labs:  Recent Labs   Lab 10/19/19  0544   *   K 4.2      CO2 25   GLU 92   BUN 11   CREATININE 0.7     Recent Labs   Lab 10/19/19  0544   PROT 6.2   ALBUMIN 2.5*   BILITOT 17.0*   AST 91*   *   ALKPHOS 1,052*     Assessment:   75 yo F with history of gallbladder malignancy s/p chemo and radiation with new, worsening obstructive jaundice with associated changes in liver enzymes. She endorses some wt loss and changes in urine, stools, " skin and eyes, but declines abdominal pain, nausea, vomiting. WBC normal, no fever; no cholangitis. . Liver changes likely related to metastatic disease or recurrent local malignancy.     Plan:  -awaiting MRCP read, and reviewing independently   -ppx antibiotics with FQ + flagyl or zosyn while inpatient   -consider antihistamines for pruritis, if no improvement can use cholestyramine   -will continue to monitor for endoscopic intervention       Please call with questions.     Omari Wray MD  [facility] Gastroenterology   [phone]

## 2019-10-19 NOTE — NURSING
Dr quiroz on unit floor, stated ok to restart the missed 1545 and 1700 meds as scheduled tomorrow. Ordered to give the protonix dose now.

## 2019-10-19 NOTE — NURSING
Pt admitted to the floor for direct admit from GI clinic. Pt oriented to room and staff. Pt is NPO and waiting for results from MRI. Pt AAOx4 and can verbalize needs to staff.Call light within reach, bed alarm on, and side rails x2.

## 2019-10-19 NOTE — PLAN OF CARE
Plan of care reviewed with patient. Voices understanding. NSR on monitor with no red alarms noted. Side rails up x3, bed low, bed alarm on, call bell within reach. Will continue to monitor.

## 2019-10-19 NOTE — NURSING
Pt and family notified of NPO status after MN. Voices complete understanding, stated she will comply.

## 2019-10-19 NOTE — CONSULTS
[facility] Consult Note    CC: jaundice     HPI: 74 y.o. female with PMH constipation, diverticulosis, H pylori, locally advanced gallbladder malignancy who presents with acute, ongoing, painless jaundice associated with dark urine and beige stools. Patient started to notice changes in urine and stools about 2 weeks ago, and then subsequently noticed yellowing of eyes and skin. She was seen by pcp and labs were checked. After abnormal labs found, she was referred to GI clinic. She was seen in the GI clinic today and was sent to ED.   In regards to her gallbladder malignancy- diagnosed in 2016, planned for a whipple but was not completed due to possible metastasis to lymph nodes. She was treated with chemo + radiation. Per patient, she completed chemotherapy about a year ago.      Denies abdominal pain, no nausea or vomiting. No night sweats. Endorses 8-10 lbs of weight loss and poor appetite.     Previous medical records reviewed and summarized above.     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    Past Surgical History   has a past surgical history that includes Appendectomy and colon polyps.    Social History  Social History     Tobacco Use    Smoking status: Light Tobacco Smoker     Packs/day: 0.50     Types: Cigarettes     Start date: 1/1/1977   Substance Use Topics    Alcohol use: No    Drug use: No     Family History  Family History   Problem Relation Age of Onset    Pneumonia Mother      Review of Systems  General ROS: negative for chills, fever. + weight loss. +jaundice  Psychological ROS: negative for hallucination, depression or suicidal ideation  Ophthalmic ROS: negative for blurry vision, photophobia or eye pain  ENT ROS: negative for epistaxis, sore throat or rhinorrhea  Respiratory ROS: no cough, shortness  "of breath, or wheezing  Cardiovascular ROS: no chest pain or dyspnea on exertion  Gastrointestinal ROS: no abdominal pain, change in bowel habits, or black/ bloody stools.   Genito-Urinary ROS: no dysuria, trouble voiding, or hematuria  Musculoskeletal ROS: negative for gait disturbance or muscular weakness  Neurological ROS: no syncope or seizures; no ataxia  Dermatological ROS: + pruritis; no rash and jaundice    Physical Examination  BP (!) 168/70 (Patient Position: Lying)   Pulse 64   Temp 98.5 °F (36.9 °C) (Oral)   Resp 18   Ht 6' 1" (1.854 m)   Wt 64.3 kg (141 lb 12.1 oz)   SpO2 95%   Breastfeeding? No   BMI 18.70 kg/m²     General appearance: alert, cooperative, no distress, jaundice  HENT: Normocephalic, atraumatic, neck symmetrical, no nasal discharge   Eyes: conjunctivae/corneas clear, PERRL, EOM's intact  Lungs: clear to auscultation in all fields, no dullness to percussion bilaterally, no wheeze  Heart: normal rate, regular rhythm without rub; palpable peripheral pulses  Abdomen: soft, non-tender; non-distended; bowel sounds normoactive; no organomegaly  Extremities: extremities symmetric; no clubbing, cyanosis, or edema  Integument: Skin color, texture, turgor normal; no rashes; hair distrubution normal  Neurologic: Alert and oriented X 3, normal strength, normal coordination  Psychiatric: no pressured speech; normal affect; no evidence of impaired cognition     Labs:  Recent Labs   Lab 10/18/19  1535      K 4.4   CL 99   CO2 25   GLU 89   BUN 9   CREATININE 0.7     Recent Labs   Lab 10/18/19  1535   PROT 7.4   ALBUMIN 3.1*   BILITOT 20.3*   *   *   ALKPHOS 1,268*     Recent Labs   Lab 10/18/19  1535   WBC 9.10   RBC 3.84*   HGB 11.9*   HCT 34.7*   *   MCV 90   MCH 31.0   MCHC 34.3     Imaging:  MRCP pending ; will follow up     Assessment:   75 yo F with history of gallbladder malignancy s/p chemo and radiation with new, worsening obstructive jaundice with associated " changes in liver enzymes. She endorses some wt loss and changes in urine, stools, skin and eyes, but declines abdominal pain, nausea, vomiting. WBC normal, no fever; no cholangitis. . Liver changes likely related to metastatic disease or recurrent local malignancy. Will obtain imaging to further delineate.     Plan:  -agree with MRCP; will follow up results  -would give ppx antibiotics with FQ + flagyl or zosyn while inpatient   -consider antihistamines for pruritis, if no improvement can use cholestyramine   -will continue to monitor for endoscopic intervention     Thank you for the consult. Please call with questions.     Omari Wray MD  [facility]   [phone]

## 2019-10-20 LAB
ALBUMIN SERPL BCP-MCNC: 2.5 G/DL (ref 3.5–5.2)
ALP SERPL-CCNC: 1077 U/L (ref 55–135)
ALT SERPL W/O P-5'-P-CCNC: 111 U/L (ref 10–44)
ANION GAP SERPL CALC-SCNC: 10 MMOL/L (ref 8–16)
AST SERPL-CCNC: 108 U/L (ref 10–40)
BASOPHILS # BLD AUTO: 0.05 K/UL (ref 0–0.2)
BASOPHILS NFR BLD: 0.7 % (ref 0–1.9)
BILIRUB SERPL-MCNC: 17.9 MG/DL (ref 0.1–1)
BUN SERPL-MCNC: 10 MG/DL (ref 8–23)
CALCIUM SERPL-MCNC: 9.3 MG/DL (ref 8.7–10.5)
CHLORIDE SERPL-SCNC: 102 MMOL/L (ref 95–110)
CO2 SERPL-SCNC: 23 MMOL/L (ref 23–29)
CREAT SERPL-MCNC: 0.8 MG/DL (ref 0.5–1.4)
DIFFERENTIAL METHOD: ABNORMAL
EOSINOPHIL # BLD AUTO: 0.1 K/UL (ref 0–0.5)
EOSINOPHIL NFR BLD: 1.9 % (ref 0–8)
ERYTHROCYTE [DISTWIDTH] IN BLOOD BY AUTOMATED COUNT: 15.1 % (ref 11.5–14.5)
EST. GFR  (AFRICAN AMERICAN): >60 ML/MIN/1.73 M^2
EST. GFR  (NON AFRICAN AMERICAN): >60 ML/MIN/1.73 M^2
GLUCOSE SERPL-MCNC: 106 MG/DL (ref 70–110)
HCT VFR BLD AUTO: 31.6 % (ref 37–48.5)
HGB BLD-MCNC: 10.7 G/DL (ref 12–16)
LYMPHOCYTES # BLD AUTO: 1.3 K/UL (ref 1–4.8)
LYMPHOCYTES NFR BLD: 18.6 % (ref 18–48)
MAGNESIUM SERPL-MCNC: 1.8 MG/DL (ref 1.6–2.6)
MCH RBC QN AUTO: 30.5 PG (ref 27–31)
MCHC RBC AUTO-ENTMCNC: 33.9 G/DL (ref 32–36)
MCV RBC AUTO: 90 FL (ref 82–98)
MONOCYTES # BLD AUTO: 0.6 K/UL (ref 0.3–1)
MONOCYTES NFR BLD: 8.8 % (ref 4–15)
NEUTROPHILS # BLD AUTO: 4.8 K/UL (ref 1.8–7.7)
NEUTROPHILS NFR BLD: 70 % (ref 38–73)
PHOSPHATE SERPL-MCNC: 3.1 MG/DL (ref 2.7–4.5)
PLATELET # BLD AUTO: 369 K/UL (ref 150–350)
PMV BLD AUTO: 9.9 FL (ref 9.2–12.9)
POTASSIUM SERPL-SCNC: 4.1 MMOL/L (ref 3.5–5.1)
PROT SERPL-MCNC: 6.4 G/DL (ref 6–8.4)
RBC # BLD AUTO: 3.51 M/UL (ref 4–5.4)
SODIUM SERPL-SCNC: 135 MMOL/L (ref 136–145)
WBC # BLD AUTO: 6.92 K/UL (ref 3.9–12.7)

## 2019-10-20 PROCEDURE — 25000003 PHARM REV CODE 250: Performed by: STUDENT IN AN ORGANIZED HEALTH CARE EDUCATION/TRAINING PROGRAM

## 2019-10-20 PROCEDURE — 63600175 PHARM REV CODE 636 W HCPCS: Performed by: STUDENT IN AN ORGANIZED HEALTH CARE EDUCATION/TRAINING PROGRAM

## 2019-10-20 PROCEDURE — 84100 ASSAY OF PHOSPHORUS: CPT

## 2019-10-20 PROCEDURE — 11000001 HC ACUTE MED/SURG PRIVATE ROOM

## 2019-10-20 PROCEDURE — 36415 COLL VENOUS BLD VENIPUNCTURE: CPT

## 2019-10-20 PROCEDURE — 83735 ASSAY OF MAGNESIUM: CPT

## 2019-10-20 PROCEDURE — 85025 COMPLETE CBC W/AUTO DIFF WBC: CPT

## 2019-10-20 PROCEDURE — 80053 COMPREHEN METABOLIC PANEL: CPT

## 2019-10-20 RX ADMIN — DOCUSATE SODIUM 100 MG: 100 CAPSULE, LIQUID FILLED ORAL at 09:10

## 2019-10-20 RX ADMIN — PANTOPRAZOLE SODIUM 40 MG: 40 TABLET, DELAYED RELEASE ORAL at 09:10

## 2019-10-20 RX ADMIN — PIPERACILLIN AND TAZOBACTAM 4.5 G: 4; .5 INJECTION, POWDER, LYOPHILIZED, FOR SOLUTION INTRAVENOUS; PARENTERAL at 08:10

## 2019-10-20 RX ADMIN — VITAMIN D, TAB 1000IU (100/BT) 1000 UNITS: 25 TAB at 09:10

## 2019-10-20 RX ADMIN — PIPERACILLIN AND TAZOBACTAM 4.5 G: 4; .5 INJECTION, POWDER, LYOPHILIZED, FOR SOLUTION INTRAVENOUS; PARENTERAL at 09:10

## 2019-10-20 RX ADMIN — ENOXAPARIN SODIUM 40 MG: 100 INJECTION SUBCUTANEOUS at 05:10

## 2019-10-20 RX ADMIN — HYDROXYZINE HYDROCHLORIDE 25 MG: 25 TABLET, FILM COATED ORAL at 09:10

## 2019-10-20 RX ADMIN — CHOLESTYRAMINE 4 G: 4 POWDER, FOR SUSPENSION ORAL at 09:10

## 2019-10-20 RX ADMIN — CHOLESTYRAMINE 4 G: 4 POWDER, FOR SUSPENSION ORAL at 08:10

## 2019-10-20 NOTE — PLAN OF CARE
Reported no pain, ambulated to bathroom 2 times before midnight.  Both times pt missed hat. Reported a small BM.  Pt very annoyed with bed alarm policy but is willing to accept alarm being on.   at bedside.       Tele: none     Bed in lowest position, wheels locked, non skid socks, ID band worn, personal items and call bell with in reach, bed alarm set.

## 2019-10-20 NOTE — PROGRESS NOTES
"[facility] Progress Note    CC: jaundice    Interval History: No issues overnight. Feeling well. No abdominal pain, nausea, vomiting.     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    Review of Systems  General ROS: negative for - chills, fever or weight loss. +jaundice, scleral icterus  Cardiovascular ROS: no chest pain or dyspnea on exertion  Gastrointestinal ROS: no abdominal pain, change in bowel habits, or black/ bloody stools    Physical Examination  BP (!) 103/51 (BP Location: Left arm, Patient Position: Lying)   Pulse 69   Temp 98.1 °F (36.7 °C) (Oral)   Resp 20   Ht 6' 1" (1.854 m)   Wt 66.7 kg (147 lb 0.8 oz)   SpO2 98%   Breastfeeding? No   BMI 19.40 kg/m²   General appearance: alert, cooperative, no distress, +jaundice  HEENT: Normocephalic, atraumatic, neck symmetrical, no nasal discharge, +sclerlal icterus  Lungs: clear to auscultation in all fields, symmetric chest wall expansion bilaterally, no wheeze, rale, or rhonchi  Heart: normal rate, regular rhythm without rub; palpable peripheral pulses  Abdomen: soft, non-tender; bowel sounds normoactive; no organomegaly  Extremities: extremities symmetric; no clubbing, cyanosis, or edema  Neurologic: Alert and oriented X 3, normal strength, normal coordination    Labs:  Recent Labs   Lab 10/20/19  0515   *   K 4.1      CO2 23      BUN 10   CREATININE 0.8     Recent Labs   Lab 10/20/19  0515   PROT 6.4   ALBUMIN 2.5*   BILITOT 17.9*   *   *   ALKPHOS 1,077*     Assessment:   75 yo F with history of gallbladder malignancy s/p chemo and radiation with new, worsening obstructive jaundice with associated changes in liver enzymes. She endorses some wt loss and changes in urine, stools, skin and eyes, but declines abdominal pain, " nausea, vomiting. WBC normal, no fever; no cholangitis. Liver changes likely related to metastatic disease or recurrent local malignancy.     Plan:  -MRCP reviewed; planning for ERCP on Monday    -cont Zosyn   -consider antihistamines for pruritis, if no improvement can use cholestyramine   -NPO at MN  -hold all AC after MN    Please call with questions.     Omari Wray MD  [facility] Gastroenterology   [phone]

## 2019-10-20 NOTE — PROGRESS NOTES
[facility] Resident [facility] Progress Note    Subjective:      Angelica Tomlinson is a 74 y.o. female who is being followed by the [facility] service at [facility] for jaundice and hyperbilirubinemia.     Mrs. Tomlinson was resting comfortably in bed with her  at bedside when I went to see her this morning. She denies any abdominal pain or nausea and vomiting. She denies any chest pain or SOB. She is to go tomorrow with GI for an ERCP. She has no further complaints at this time.     Objective:   Last 24 Hour Vital Signs:  BP  Min: 98/54  Max: 128/58  Temp  Av.2 °F (36.8 °C)  Min: 96.9 °F (36.1 °C)  Max: 99.4 °F (37.4 °C)  Pulse  Av.3  Min: 61  Max: 73  Resp  Av.2  Min: 16  Max: 20  SpO2  Av.5 %  Min: 92 %  Max: 98 %  Weight  Av.7 kg (147 lb 0.8 oz)  Min: 66.7 kg (147 lb 0.8 oz)  Max: 66.7 kg (147 lb 0.8 oz)  I/O last 3 completed shifts:  In: 1236 [P.O.:1036; IV Piggyback:200]  Out: 904 [Urine:904]    Physical Examination:  General: alert, cooperative, jaundice  HENT: Normocephalic, atraumatic, neck symmetrical, no nasal discharge  Eyes: jaundiced sclera noted bilaterally, PERRL, EOM's intact  Cardio: normal rate, regular rhythm without rub; palpable peripheral pulses  Lungs: clear to auscultation in all fields, no dullness to percussion bilaterally, no wheeze  Abdomen: soft, non-tender; non-distended; bowel sounds normoactive; no organomegaly  Extremities: extremities symmetric; no clubbing, cyanosis, or edema  Skin: Skin color, texture, turgor normal; no rashes; hair distrubution normal  Neurologic: A&Ox3, normal strength, normal coordination  Psychiatric: no pressured speech; normal affect; no evidence of impaired cognition     Laboratory:  Recent Labs   Lab 10/18/19  1535 10/19/19  0544 10/20/19  0515   WBC 9.10 7.07 6.92   HGB 11.9* 10.5* 10.7*   HCT 34.7* 30.6* 31.6*   * 363* 369*    135* 135*   K 4.4 4.2 4.1   CL 99 101 102   CREATININE 0.7 0.7 0.8   BUN 9 11 10    CO2 25 25 23   * 112* 111*   * 91* 108*     Radiology:  MRI/MRCP pending read    Current Medications:     Scheduled:   amLODIPine  10 mg Oral Daily    aspirin  81 mg Oral Once    cholestyramine  1 packet Oral BID    docusate sodium  100 mg Oral BID    enoxaparin  40 mg Subcutaneous Daily    lisinopril  20 mg Oral Daily    pantoprazole  40 mg Oral Daily    piperacillin-tazobactam (ZOSYN) IVPB  4.5 g Intravenous Q12H    vitamin D  1,000 Units Oral Daily        PRN:  hydrOXYzine HCl, sodium chloride 0.9%    Assessment:     Angelica Tomlinson is a 74 y.o.female with  Patient Active Problem List    Diagnosis Date Noted    Transaminitis 10/18/2019    Jaundice 10/18/2019    HTN (hypertension) 10/18/2019    Obstructive jaundice 10/18/2019    History of gallbladder cancer 10/09/2019    Hiatal hernia      Plan:     Painless Jaundice with history of locally advanced stage IIIB gallbladder cancer  - 10/9/19 Bilirubin 10.3, ALP 1388, ,   - MRI/MRCP pending read, GI following; Plan for ERCP Monday  - Tbili 20.3 Dbili >14 on admissions  - Admitted for further workup and likely that cancer has returned and will need to resume treatment  - Clear liquid diet; NPO at midnight for ERCP tomorrow  - On PPx zosyn, PRN benadryl, cholestyramine for pruritis  - Continue to monitor     Transaminitis  - 10/9 ,   -   on admission;   today  - ALP 1268 on admission; 1077 today  - GGT 1610    Hyperbilirubinemia  - 10/9 bilirubin 10.3  - Tbili 20.3 and Dbili >14.0 on admission  - Tbili 17.9 today; Continue to monitor     Hypertension  - /70 on arrival  - Continued home lisinopril and amlodipine, will continue to monitor    Normocytic Anemia  - Hgb 11.9 on admission; MCV 90  - Continue to monitor; transfuse hgb<7    Thrombocytopenia  - Mildly elevated 416 on admission; 369 today, continue to monitor    Constipation  - Continued home colace, continue to  monitor     Vitamin D deficiency  - Continued home Vitamin D     Diet: Clear liquid; NPO at Midnight pending ERCP tomorrow  Code: Full  PPx: Lovenox  Dispo: Pending completion of workup for jaundice and hyperbilirubinemia; ERCP Tomorrow w/ GI    Hubert Lim MD  [facility] Internal Medicine HO-I  [facility] Hospitalist Team B     [facility] Medicine Hospitalist Pager numbers:   [facility] Hospitalist Medicine Team A (Pilar/Marky): [phone]  [facility] Hospitalist Medicine Team B (Gabby/Aniceto):  [phone]

## 2019-10-21 ENCOUNTER — CLINICAL SUPPORT (OUTPATIENT)
Dept: SMOKING CESSATION | Facility: CLINIC | Age: 74
End: 2019-10-21

## 2019-10-21 ENCOUNTER — ANESTHESIA (OUTPATIENT)
Dept: ENDOSCOPY | Facility: HOSPITAL | Age: 74
DRG: 445 | End: 2019-10-21
Payer: MEDICARE

## 2019-10-21 ENCOUNTER — ANESTHESIA EVENT (OUTPATIENT)
Dept: ENDOSCOPY | Facility: HOSPITAL | Age: 74
DRG: 445 | End: 2019-10-21
Payer: MEDICARE

## 2019-10-21 DIAGNOSIS — F17.210 CIGARETTE SMOKER: Primary | ICD-10-CM

## 2019-10-21 LAB
ALBUMIN SERPL BCP-MCNC: 2.4 G/DL (ref 3.5–5.2)
ALP SERPL-CCNC: 1029 U/L (ref 55–135)
ALT SERPL W/O P-5'-P-CCNC: 114 U/L (ref 10–44)
ANION GAP SERPL CALC-SCNC: 9 MMOL/L (ref 8–16)
AST SERPL-CCNC: 129 U/L (ref 10–40)
BASOPHILS # BLD AUTO: 0.05 K/UL (ref 0–0.2)
BASOPHILS NFR BLD: 0.8 % (ref 0–1.9)
BILIRUB SERPL-MCNC: 17.8 MG/DL (ref 0.1–1)
BUN SERPL-MCNC: 9 MG/DL (ref 8–23)
CALCIUM SERPL-MCNC: 9.3 MG/DL (ref 8.7–10.5)
CHLORIDE SERPL-SCNC: 103 MMOL/L (ref 95–110)
CO2 SERPL-SCNC: 23 MMOL/L (ref 23–29)
CREAT SERPL-MCNC: 0.7 MG/DL (ref 0.5–1.4)
DIFFERENTIAL METHOD: ABNORMAL
EOSINOPHIL # BLD AUTO: 0.1 K/UL (ref 0–0.5)
EOSINOPHIL NFR BLD: 1.4 % (ref 0–8)
ERYTHROCYTE [DISTWIDTH] IN BLOOD BY AUTOMATED COUNT: 15.5 % (ref 11.5–14.5)
EST. GFR  (AFRICAN AMERICAN): >60 ML/MIN/1.73 M^2
EST. GFR  (NON AFRICAN AMERICAN): >60 ML/MIN/1.73 M^2
GLUCOSE SERPL-MCNC: 69 MG/DL (ref 70–110)
HCT VFR BLD AUTO: 30.3 % (ref 37–48.5)
HGB BLD-MCNC: 10.5 G/DL (ref 12–16)
LYMPHOCYTES # BLD AUTO: 1.2 K/UL (ref 1–4.8)
LYMPHOCYTES NFR BLD: 19.2 % (ref 18–48)
MAGNESIUM SERPL-MCNC: 1.7 MG/DL (ref 1.6–2.6)
MCH RBC QN AUTO: 30.5 PG (ref 27–31)
MCHC RBC AUTO-ENTMCNC: 34.7 G/DL (ref 32–36)
MCV RBC AUTO: 88 FL (ref 82–98)
MONOCYTES # BLD AUTO: 0.7 K/UL (ref 0.3–1)
MONOCYTES NFR BLD: 10.5 % (ref 4–15)
NEUTROPHILS # BLD AUTO: 4.2 K/UL (ref 1.8–7.7)
NEUTROPHILS NFR BLD: 68.1 % (ref 38–73)
PHOSPHATE SERPL-MCNC: 3.5 MG/DL (ref 2.7–4.5)
PLATELET # BLD AUTO: 348 K/UL (ref 150–350)
PMV BLD AUTO: 10.4 FL (ref 9.2–12.9)
POTASSIUM SERPL-SCNC: 3.9 MMOL/L (ref 3.5–5.1)
PROT SERPL-MCNC: 6.2 G/DL (ref 6–8.4)
RBC # BLD AUTO: 3.44 M/UL (ref 4–5.4)
SODIUM SERPL-SCNC: 135 MMOL/L (ref 136–145)
WBC # BLD AUTO: 6.21 K/UL (ref 3.9–12.7)

## 2019-10-21 PROCEDURE — 43261 ENDO CHOLANGIOPANCREATOGRAPH: CPT | Performed by: INTERNAL MEDICINE

## 2019-10-21 PROCEDURE — 43260 ERCP W/SPECIMEN COLLECTION: CPT | Performed by: INTERNAL MEDICINE

## 2019-10-21 PROCEDURE — 11000001 HC ACUTE MED/SURG PRIVATE ROOM

## 2019-10-21 PROCEDURE — 25000003 PHARM REV CODE 250: Performed by: STUDENT IN AN ORGANIZED HEALTH CARE EDUCATION/TRAINING PROGRAM

## 2019-10-21 PROCEDURE — 99406 BEHAV CHNG SMOKING 3-10 MIN: CPT | Mod: S$GLB,,,

## 2019-10-21 PROCEDURE — 80053 COMPREHEN METABOLIC PANEL: CPT

## 2019-10-21 PROCEDURE — 99999 PR PBB SHADOW E&M-EST. PATIENT-LVL I: CPT | Mod: PBBFAC,,,

## 2019-10-21 PROCEDURE — 37000008 HC ANESTHESIA 1ST 15 MINUTES: Performed by: INTERNAL MEDICINE

## 2019-10-21 PROCEDURE — C1769 GUIDE WIRE: HCPCS | Performed by: INTERNAL MEDICINE

## 2019-10-21 PROCEDURE — 88305 TISSUE EXAM BY PATHOLOGIST: CPT | Performed by: PATHOLOGY

## 2019-10-21 PROCEDURE — 63600175 PHARM REV CODE 636 W HCPCS: Performed by: NURSE ANESTHETIST, CERTIFIED REGISTERED

## 2019-10-21 PROCEDURE — 74328 X-RAY BILE DUCT ENDOSCOPY: CPT | Mod: 26,,, | Performed by: INTERNAL MEDICINE

## 2019-10-21 PROCEDURE — 36415 COLL VENOUS BLD VENIPUNCTURE: CPT

## 2019-10-21 PROCEDURE — 43261 PR ERCP,BIOPSY: ICD-10-PCS | Mod: 59,,, | Performed by: INTERNAL MEDICINE

## 2019-10-21 PROCEDURE — 43274 PR ERCP W/STENT PLCMNT BILIARY/PANCREATIC DUCT: ICD-10-PCS | Mod: 59,,, | Performed by: INTERNAL MEDICINE

## 2019-10-21 PROCEDURE — 25000003 PHARM REV CODE 250: Performed by: INTERNAL MEDICINE

## 2019-10-21 PROCEDURE — 83735 ASSAY OF MAGNESIUM: CPT

## 2019-10-21 PROCEDURE — 37000009 HC ANESTHESIA EA ADD 15 MINS: Performed by: INTERNAL MEDICINE

## 2019-10-21 PROCEDURE — 27200946 HC BRUSH, CYTOLOGY: Performed by: INTERNAL MEDICINE

## 2019-10-21 PROCEDURE — 74328 PR  X-RAY FOR BILE DUCT ENDOSCOPY: ICD-10-PCS | Mod: 26,,, | Performed by: INTERNAL MEDICINE

## 2019-10-21 PROCEDURE — 99406 PT REFUSED TOBACCO CESSATION: ICD-10-PCS | Mod: S$GLB,,,

## 2019-10-21 PROCEDURE — 63600175 PHARM REV CODE 636 W HCPCS: Performed by: INTERNAL MEDICINE

## 2019-10-21 PROCEDURE — S0028 INJECTION, FAMOTIDINE, 20 MG: HCPCS | Performed by: STUDENT IN AN ORGANIZED HEALTH CARE EDUCATION/TRAINING PROGRAM

## 2019-10-21 PROCEDURE — 43274 ERCP DUCT STENT PLACEMENT: CPT | Performed by: INTERNAL MEDICINE

## 2019-10-21 PROCEDURE — 84100 ASSAY OF PHOSPHORUS: CPT

## 2019-10-21 PROCEDURE — 27201012 HC FORCEPS, HOT/COLD, DISP: Performed by: INTERNAL MEDICINE

## 2019-10-21 PROCEDURE — 43261 ENDO CHOLANGIOPANCREATOGRAPH: CPT | Mod: 59,,, | Performed by: INTERNAL MEDICINE

## 2019-10-21 PROCEDURE — 27202304 HC CANNULA, ERCP: Performed by: INTERNAL MEDICINE

## 2019-10-21 PROCEDURE — 27201674 HC SPHINCTERTOME: Performed by: INTERNAL MEDICINE

## 2019-10-21 PROCEDURE — 88305 TISSUE SPECIMEN TO PATHOLOGY - SURGERY: ICD-10-PCS | Mod: 26,,, | Performed by: PATHOLOGY

## 2019-10-21 PROCEDURE — 99999 PR PBB SHADOW E&M-EST. PATIENT-LVL I: ICD-10-PCS | Mod: PBBFAC,,,

## 2019-10-21 PROCEDURE — 88305 TISSUE EXAM BY PATHOLOGIST: CPT | Mod: 26,,, | Performed by: PATHOLOGY

## 2019-10-21 PROCEDURE — C2617 STENT, NON-COR, TEM W/O DEL: HCPCS | Performed by: INTERNAL MEDICINE

## 2019-10-21 PROCEDURE — 85025 COMPLETE CBC W/AUTO DIFF WBC: CPT

## 2019-10-21 PROCEDURE — 43274 ERCP DUCT STENT PLACEMENT: CPT | Mod: ,,, | Performed by: INTERNAL MEDICINE

## 2019-10-21 RX ORDER — PROPOFOL 10 MG/ML
VIAL (ML) INTRAVENOUS CONTINUOUS PRN
Status: DISCONTINUED | OUTPATIENT
Start: 2019-10-21 | End: 2019-10-21

## 2019-10-21 RX ORDER — LIDOCAINE HCL/PF 100 MG/5ML
SYRINGE (ML) INTRAVENOUS
Status: DISCONTINUED | OUTPATIENT
Start: 2019-10-21 | End: 2019-10-21

## 2019-10-21 RX ORDER — FAMOTIDINE 10 MG/ML
20 INJECTION INTRAVENOUS ONCE
Status: DISCONTINUED | OUTPATIENT
Start: 2019-10-21 | End: 2019-10-21

## 2019-10-21 RX ORDER — SODIUM CHLORIDE 9 MG/ML
INJECTION, SOLUTION INTRAVENOUS CONTINUOUS PRN
Status: DISCONTINUED | OUTPATIENT
Start: 2019-10-21 | End: 2019-10-21

## 2019-10-21 RX ORDER — INDOMETHACIN 50 MG/1
100 SUPPOSITORY RECTAL ONCE
Status: COMPLETED | OUTPATIENT
Start: 2019-10-21 | End: 2019-10-21

## 2019-10-21 RX ORDER — FAMOTIDINE 10 MG/ML
20 INJECTION INTRAVENOUS ONCE
Status: COMPLETED | OUTPATIENT
Start: 2019-10-21 | End: 2019-10-21

## 2019-10-21 RX ORDER — PROPOFOL 10 MG/ML
VIAL (ML) INTRAVENOUS
Status: DISCONTINUED | OUTPATIENT
Start: 2019-10-21 | End: 2019-10-21

## 2019-10-21 RX ADMIN — PROPOFOL 40 MG: 10 INJECTION, EMULSION INTRAVENOUS at 09:10

## 2019-10-21 RX ADMIN — LISINOPRIL 20 MG: 20 TABLET ORAL at 11:10

## 2019-10-21 RX ADMIN — SODIUM CHLORIDE: 0.9 INJECTION, SOLUTION INTRAVENOUS at 09:10

## 2019-10-21 RX ADMIN — PANTOPRAZOLE SODIUM 40 MG: 40 TABLET, DELAYED RELEASE ORAL at 11:10

## 2019-10-21 RX ADMIN — INDOMETHACIN 100 MG: 50 SUPPOSITORY RECTAL at 10:10

## 2019-10-21 RX ADMIN — DOCUSATE SODIUM 100 MG: 100 CAPSULE, LIQUID FILLED ORAL at 11:10

## 2019-10-21 RX ADMIN — PROPOFOL 125 MCG/KG/MIN: 10 INJECTION, EMULSION INTRAVENOUS at 09:10

## 2019-10-21 RX ADMIN — VITAMIN D, TAB 1000IU (100/BT) 1000 UNITS: 25 TAB at 11:10

## 2019-10-21 RX ADMIN — LIDOCAINE HYDROCHLORIDE 100 MG: 20 INJECTION, SOLUTION INTRAVENOUS at 09:10

## 2019-10-21 RX ADMIN — CHOLESTYRAMINE 4 G: 4 POWDER, FOR SUSPENSION ORAL at 10:10

## 2019-10-21 RX ADMIN — DOCUSATE SODIUM 100 MG: 100 CAPSULE, LIQUID FILLED ORAL at 10:10

## 2019-10-21 RX ADMIN — PROPOFOL 60 MG: 10 INJECTION, EMULSION INTRAVENOUS at 09:10

## 2019-10-21 RX ADMIN — PIPERACILLIN AND TAZOBACTAM 4.5 G: 4; .5 INJECTION, POWDER, LYOPHILIZED, FOR SOLUTION INTRAVENOUS; PARENTERAL at 10:10

## 2019-10-21 RX ADMIN — AMLODIPINE BESYLATE 10 MG: 5 TABLET ORAL at 11:10

## 2019-10-21 RX ADMIN — PIPERACILLIN AND TAZOBACTAM 4.5 G: 4; .5 INJECTION, POWDER, LYOPHILIZED, FOR SOLUTION INTRAVENOUS; PARENTERAL at 11:10

## 2019-10-21 RX ADMIN — FAMOTIDINE 20 MG: 10 INJECTION, SOLUTION INTRAVENOUS at 10:10

## 2019-10-21 NOTE — PLAN OF CARE
Past Medical History:   Diagnosis Date    Cancer     Gallbladder    Constipation     Cyst of breast, left, benign solitary     Diverticulosis     Duodenal mass 5/16/2016    Helicobacter pylori gastritis     Hiatal hernia     Tobacco abuse 10/9/2019     Recovery near completion, report called to Aasd on nursing unit,  at bedside. No c/o pain or discomfort. Side rails up x2.

## 2019-10-21 NOTE — PROGRESS NOTES
Pharmacist Renal Dose Adjustment Note    Angelica Tmolinson is a 74 y.o. female being treated with the medication zosyn    Patient Data:    Vital Signs (Most Recent):  Temp: 97.5 °F (36.4 °C) (10/21/19 0501)  Pulse: (!) 55 (10/21/19 0501)  Resp: 18 (10/21/19 0501)  BP: (!) 142/63 (10/21/19 0501)  SpO2: (!) 94 % (10/21/19 0501)   Vital Signs (72h Range):  Temp:  [96.9 °F (36.1 °C)-99.4 °F (37.4 °C)]   Pulse:  [55-77]   Resp:  [16-20]   BP: ()/(51-70)   SpO2:  [92 %-99 %]      Recent Labs   Lab 10/18/19  1535 10/19/19  0544 10/20/19  0515   CREATININE 0.7 0.7 0.8     Serum creatinine: 0.8 mg/dL 10/20/19 0515  Estimated creatinine clearance: 66.9 mL/min    Medication:zosyn dose: 4.5g frequency q12 will be changed to medication:zosyn dose:4.5g frequency:q8    Pharmacist's Name: Ruben Pardo  Pharmacist's Extension: [phone]

## 2019-10-21 NOTE — ANESTHESIA POSTPROCEDURE EVALUATION
Anesthesia Post Evaluation    Patient: Angelica Tomlinson    Procedure(s) Performed: Procedure(s) (LRB):  ERCP (ENDOSCOPIC RETROGRADE CHOLANGIOPANCREATOGRAPHY) (N/A)    Final Anesthesia Type: MAC  Patient location during evaluation: GI PACU  Patient participation: Yes- Able to Participate  Level of consciousness: awake and alert and oriented  Post-procedure vital signs: reviewed and stable  Pain management: adequate  Airway patency: patent  PONV status at discharge: No PONV  Anesthetic complications: no      Cardiovascular status: blood pressure returned to baseline, hemodynamically stable and stable  Respiratory status: unassisted and spontaneous ventilation  Hydration status: euvolemic  Follow-up not needed.          Vitals Value Taken Time   /65 10/21/2019  9:15 AM   Temp 36.5 °C (97.7 °F) 10/21/2019  9:15 AM   Pulse 66 10/21/2019  9:15 AM   Resp 18 10/21/2019  9:15 AM   SpO2 99 % 10/21/2019  9:15 AM         No case tracking events are documented in the log.      Pain/Mine Score: No data recorded

## 2019-10-21 NOTE — PLAN OF CARE
1900 reported no pain.  Ambulated to bathroom w/no pain or distress.   at bed side. Urine very dark tea color.   NPO after 0000 10/21.     Tele: none.     Bed in lowest position, wheels locked, non skid socks, ID band worn, personal items and call bell with in reach, bed alarm set.

## 2019-10-21 NOTE — PLAN OF CARE
Dr. Lu visited at bedside, discussed findings and recommendations with patient and family member; all questions asked and answered. Verbalized understanding of information given. Handout provided at time of discharge.

## 2019-10-21 NOTE — PLAN OF CARE
Pt lives with spouse Teddy [phone].  She was independent at home prior to hospital stay.  They live on the [facility] and her PCP was [facility] and now she has switched to Dr. Barroso at [facility] on [address].  Teddy can help pt as needed at home.  White board updated with CM name and contact information.  Discharge brochure provided.  Pt encouraged to call with any questions or concerns.  Cm will continue to follow pt through transitions of care and assist with any discharge needs.       10/21/19 1238   Discharge Assessment   Assessment Type Discharge Planning Assessment   Confirmed/corrected address and phone number on facesheet? Yes   Assessment information obtained from? Patient   Communicated expected length of stay with patient/caregiver yes   Prior to hospitilization cognitive status: Alert/Oriented   Prior to hospitalization functional status: Independent   Current cognitive status: Alert/Oriented   Current Functional Status: Needs Assistance   Facility Arrived From: home   Lives With spouse   Able to Return to Prior Arrangements yes   Is patient able to care for self after discharge? Yes   Who are your caregiver(s) and their phone number(s)? Teddy [phone]   Patient's perception of discharge disposition home or selfcare   Readmission Within the Last 30 Days no previous admission in last 30 days   Patient currently being followed by outpatient case management? No   Patient currently receives any other outside agency services? No   Equipment Currently Used at Home none   Do you have any problems affording any of your prescribed medications? No   Is the patient taking medications as prescribed? yes   Does the patient have transportation home? Yes   Transportation Anticipated family or friend will provide   Discharge Plan A Home   Discharge Plan B Home with family   DME Needed Upon Discharge    (TBD)   Patient/Family in Agreement with Plan yes   Does the patient have transportation to healthcare appointments? Yes      Alvaro Scott RN,   [phone]

## 2019-10-21 NOTE — PROGRESS NOTES
Smoking cessation education provided. Pt denies nicotine withdrawal symptoms, stating that she rarely smokes any more.  She states that she started smoking at the age of 31 and has cut down recently to only smoking occasionally. Pt declines enrollment in the [employer].  Handout provided for Ambulatory Smoking Cessation program in the event pt should require resources in the future.

## 2019-10-21 NOTE — TRANSFER OF CARE
"Anesthesia Transfer of Care Note    Patient: Angelica Tomlinson    Procedure(s) Performed: Procedure(s) (LRB):  ERCP (ENDOSCOPIC RETROGRADE CHOLANGIOPANCREATOGRAPHY) (N/A)    Patient location: GI    Anesthesia Type: MAC    Transport from OR: Transported from OR on room air with adequate spontaneous ventilation    Post pain: adequate analgesia    Post assessment: no apparent anesthetic complications and tolerated procedure well    Post vital signs: stable    Level of consciousness: awake and alert    Nausea/Vomiting: no nausea/vomiting    Complications: none    Transfer of care protocol was followed      Last vitals:   Visit Vitals  /51   Pulse 66   Temp 36.5 °C (97.7 °F)   Resp 18   Ht 6' 1" (1.854 m)   Wt 67.7 kg (149 lb 4 oz)   SpO2 99%   Breastfeeding? No   BMI 19.69 kg/m²     "

## 2019-10-21 NOTE — INTERVAL H&P NOTE
The patient has been examined and the H&P has been reviewed:    I concur with the findings and no changes have occurred since H&P was written.    Anesthesia/Surgery risks, benefits and alternative options discussed and understood by patient/family.          Active Hospital Problems    Diagnosis  POA    *Transaminitis [R74.0]  Yes    Jaundice [R17]  Yes    HTN (hypertension) [I10]  Yes    Obstructive jaundice [K83.1]  Yes      Resolved Hospital Problems    Diagnosis Date Resolved POA    Tobacco abuse [Z72.0] 10/18/2019 Yes

## 2019-10-21 NOTE — [employer] PATIENT INSTRUCTIONS
Discharge Summary/Instructions after an Endoscopic Procedure  Patient Name: Angelica Tomlinson  Patient MRN: [id number]  Patient YOB: 1945 Monday, October 21, 2019  Luis Antonio Lu MD  RESTRICTIONS:  During your procedure today, you received medications for sedation.  These   medications may affect your judgment, balance and coordination.  Therefore,   for 24 hours, you have the following restrictions:   - DO NOT drive a car, operate machinery, make legal/financial decisions,   sign important papers or drink alcohol.    ACTIVITY:  Today: no heavy lifting, straining or running due to procedural   sedation/anesthesia.  The following day: return to full activity including work.  DIET:  Eat and drink normally unless instructed otherwise.     TREATMENT FOR COMMON SIDE EFFECTS:  - Mild abdominal pain, nausea, belching, bloating or excessive gas:  rest,   eat lightly and use a heating pad.  - Sore Throat: treat with throat lozenges and/or gargle with warm salt   water.  - Because air was used during the procedure, expelling large amounts of air   from your rectum or belching is normal.  - If a bowel prep was taken, you may not have a bowel movement for 1-3 days.    This is normal.  SYMPTOMS TO WATCH FOR AND REPORT TO YOUR PHYSICIAN:  1. Abdominal pain or bloating, other than gas cramps.  2. Chest pain.  3. Back pain.  4. Signs of infection such as: chills or fever occurring within 24 hours   after the procedure.  5. Rectal bleeding, which would show as bright red, maroon, or black stools.   (A tablespoon of blood from the rectum is not serious, especially if   hemorrhoids are present.)  6. Vomiting.  7. Weakness or dizziness.  GO DIRECTLY TO THE NEAREST EMERGENCY ROOM IF YOU HAVE ANY OF THE FOLLOWING:      Difficulty breathing              Chills and/or fever over 101 F   Persistent vomiting and/or vomiting blood   Severe abdominal pain   Severe chest pain   Black, tarry stools   Bleeding- more than one tablespoon   Any  other symptom or condition that you feel may need urgent attention  Your doctor recommends these additional instructions:  If any biopsies were taken, your doctors clinic will contact you in 1 to 2   weeks with any results.  - [facility] to [facility].   - Await path results.   - Recommend that pt has f/u appt with oncologist (known to Dr. Klaudia Wlaton on   [facility]).  - Repeat ERCP in 6-8 weeks to exchange stent. If tissue confirmation is not   definitive on today's path (and is desired), EUS tissue sampling can be   performed at same session. Would consider endobiliary RF ablation at time   of next stent change (if tissue sampling is positive from today's ERCP). My   office will arrange for repeat ERCP.  For questions, problems or results please call your physician - Luis Antonio Lu MD at Work:  [phone].  EMERGENCY PHONE NUMBER: [phone],  LAB RESULTS: [phone]  IF A COMPLICATION OR EMERGENCY SITUATION ARISES AND YOU ARE UNABLE TO REACH   YOUR PHYSICIAN - GO DIRECTLY TO THE EMERGENCY ROOM.  Luis Antonio Lu MD  10/21/2019 10:50:58 AM  This report has been verified and signed electronically.  [employer]

## 2019-10-21 NOTE — PROGRESS NOTES
[facility] IM Resident [facility] Progress Note    Subjective:      Angelica Tomlinson is a 74 y.o. female who is being followed by the [facility] IM service at [facility] for jaundice and hyperbilirubinemia.     Mrs. Tomlinson was resting comfortably in bed with her  at her side. She had no complaints of chest pain or SOB overnight. She denied any abdominal pain or nausea/vomiting. She is to go today for her ERCP with gastroenterology. She has no further complaints at this time.     Objective:   Last 24 Hour Vital Signs:  BP  Min: 103/51  Max: 142/63  Temp  Av.9 °F (36.6 °C)  Min: 97.3 °F (36.3 °C)  Max: 99.2 °F (37.3 °C)  Pulse  Av  Min: 55  Max: 69  Resp  Av.2  Min: 18  Max: 20  SpO2  Av %  Min: 94 %  Max: 99 %  Weight  Av.7 kg (149 lb 4 oz)  Min: 67.7 kg (149 lb 4 oz)  Max: 67.7 kg (149 lb 4 oz)  I/O last 3 completed shifts:  In: 1260 [P.O.:1060; IV Piggyback:200]  Out: 2504 [Urine:2504]    Physical Examination:  General: alert, cooperative, jaundice  HENT: Normocephalic, atraumatic, neck symmetrical, no nasal discharge  Eyes: jaundiced sclera noted bilaterally, PERRL, EOM's intact  Cardio: normal rate, regular rhythm without rub; palpable peripheral pulses  Lungs: clear to auscultation in all fields, no dullness to percussion bilaterally, no wheeze  Abdomen: soft, non-tender; non-distended; bowel sounds normoactive; no organomegaly  Extremities: extremities symmetric; no clubbing, cyanosis, or edema  Skin: Skin color, texture, turgor normal; no rashes; hair distrubution normal  Neurologic: A&Ox3, normal strength, normal coordination  Psychiatric: no pressured speech; normal affect; no evidence of impaired cognition     Laboratory:  Recent Labs   Lab 10/18/19  1535 10/19/19  0544 10/20/19  0515   WBC 9.10 7.07 6.92   HGB 11.9* 10.5* 10.7*   HCT 34.7* 30.6* 31.6*   * 363* 369*    135* 135*   K 4.4 4.2 4.1   CL 99 101 102   CREATININE 0.7 0.7 0.8   BUN 9 11 10   CO2 25 25  23   * 112* 111*   * 91* 108*     Radiology:  MRI/MRCP pending read    Current Medications:     Scheduled:   amLODIPine  10 mg Oral Daily    aspirin  81 mg Oral Once    cholestyramine  1 packet Oral BID    docusate sodium  100 mg Oral BID    lisinopril  20 mg Oral Daily    pantoprazole  40 mg Oral Daily    piperacillin-tazobactam (ZOSYN) IVPB  4.5 g Intravenous Q12H    vitamin D  1,000 Units Oral Daily        PRN:  hydrOXYzine HCl, sodium chloride 0.9%    Assessment:     Angelica Tomlinson is a 74 y.o.female with  Patient Active Problem List    Diagnosis Date Noted    Transaminitis 10/18/2019    Jaundice 10/18/2019    HTN (hypertension) 10/18/2019    Obstructive jaundice 10/18/2019    History of gallbladder cancer 10/09/2019    Hiatal hernia      Plan:     Painless Jaundice with history of locally advanced stage IIIB gallbladder cancer  - 10/9/19 Bilirubin 10.3, ALP 1388, ,   - MRI/MRCP pending read, GI following; Plan for ERCP Monday  - Tbili 20.3 Dbili >14 on admissions  - Admitted for further workup and likely that cancer has returned and will need to resume treatment  - Clear liquid diet; NPO for ERCP today  - On PPx zosyn, PRN benadryl, cholestyramine for pruritis  - Continue to monitor     Transaminitis  - 10/9 ,   -   on admission;   today  - ALP 1268 on admission; 1077 today  - GGT 1610    Hyperbilirubinemia  - 10/9 bilirubin 10.3  - Tbili 20.3 and Dbili >14.0 on admission  - Tbili 17.9 10/21; Continue to monitor     Hypertension  - /70 on arrival  - Continued home lisinopril and amlodipine, will monitor    Normocytic Anemia  - Hgb 11.9 on admission; MCV 90  - Continue to monitor; transfuse hgb<7    Thrombocytopenia  - Mildly elevated 416 on admission; 369 on 10/20, continue to monitor    Constipation  - Continued home colace, monitor     Vitamin D deficiency  - Continued home Vitamin D     Diet: NPO  Code: Full  PPx:  Lovenox  Dispo: Pending completion of workup for jaundice and hyperbilirubinemia; ERCP Today w/ GI    Hubert Lim MD  [facility] HO-I  [facility] Hospitalist Team B     [facility] Medicine Hospitalist Pager numbers:   [facility] Hospitalist Medicine Team A (Pilar/Marky): [phone]  [facility] Hospitalist Medicine Team B (Gabby/Aniceto):  [phone]

## 2019-10-21 NOTE — ANESTHESIA PREPROCEDURE EVALUATION
10/21/2019    Angelica Tomlinson is a 74 y.o. female admitted with obstructive jaundice for ERCP under GA. 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51* 3.44*   HGB 10.7* 10.5*   HCT 31.6* 30.3*   * 348   MCV 90 88   MCH 30.5 30.5   MCHC 33.9 34.7         Recent Labs     10/19/19  0544 10/20/19  0515   * 135*   K 4.2 4.1    102   CO2 25 23   BUN 11 10   CREATININE 0.7 0.8   GLU 92 106   MG 1.7 1.8   PHOS 3.9 3.1   CALCIUM 9.4 9.3   ALBUMIN 2.5* 2.5*   PROT 6.2 6.4   ALKPHOS 1,052* 1,077*   * 111*   AST 91* 108*   BILITOT 17.0* 17.9*       Pre-op Assessment    I have reviewed the Patient Summary Reports.      I have reviewed the Medications.     Review of Systems  Anesthesia Hx:  No problems with previous Anesthesia  History of prior surgery of interest to airway management or planning:  Denies Personal Hx of Anesthesia complications.   Social:  No Alcohol Use, Smoker    Hematology/Oncology:         -- Anemia: Current/Recent Cancer.   Cardiovascular:   Exercise tolerance: good Hypertension, well controlled    Renal/:  Renal/ Normal      Hepatic/GI:   Hiatal Hernia, GERD Gallbladder cancer stage 3b  Transaminitis   Neurological:  Neurology Normal    Endocrine:  Endocrine Normal        Physical Exam  General:  Well nourished    Airway/Jaw/Neck:  Airway Findings: Mouth Opening: Normal Tongue: Normal  General Airway Assessment: Adult  Mallampati: III  Improves to II with phonation.  TM Distance: Normal, at least 6 cm  Jaw/Neck Findings:  Neck ROM: Normal ROM      Dental:  Dental Findings: Upper Dentures, Lower Dentures    Chest/Lungs:  Chest/Lungs Findings: Clear to auscultation, Normal Respiratory Rate     Heart/Vascular:  Heart Findings: Rate: Normal  Rhythm: Regular Rhythm  Sounds: Normal        Mental Status:  Mental Status Findings:  Cooperative, Alert and Oriented         Anesthesia Plan  Type of Anesthesia, risks & benefits discussed:  Anesthesia Type:  general  Patient's Preference:   Intra-op Monitoring Plan: standard ASA monitors  Intra-op Monitoring Plan Comments:   Post Op Pain Control Plan:   Post Op Pain Control Plan Comments:   Induction:   IV  Beta Blocker:  Patient is not currently on a Beta-Blocker (No further documentation required).       Informed Consent: Patient understands risks and agrees with Anesthesia plan.  Questions answered. Anesthesia consent signed with patient.  ASA Score: 3     Day of Surgery Review of History & Physical:    H&P update referred to the surgeon.         Ready For Surgery From Anesthesia Perspective.

## 2019-10-22 ENCOUNTER — TELEPHONE (OUTPATIENT)
Dept: ENDOSCOPY | Facility: HOSPITAL | Age: 74
End: 2019-10-22

## 2019-10-22 VITALS
DIASTOLIC BLOOD PRESSURE: 58 MMHG | RESPIRATION RATE: 18 BRPM | BODY MASS INDEX: 20.1 KG/M2 | HEART RATE: 52 BPM | TEMPERATURE: 97 F | WEIGHT: 148.38 LBS | SYSTOLIC BLOOD PRESSURE: 121 MMHG | HEIGHT: 72 IN | OXYGEN SATURATION: 97 %

## 2019-10-22 DIAGNOSIS — K83.1 BILIARY STRICTURE: Primary | ICD-10-CM

## 2019-10-22 LAB
ALBUMIN SERPL BCP-MCNC: 2.3 G/DL (ref 3.5–5.2)
ALP SERPL-CCNC: 961 U/L (ref 55–135)
ALT SERPL W/O P-5'-P-CCNC: 94 U/L (ref 10–44)
ANION GAP SERPL CALC-SCNC: 10 MMOL/L (ref 8–16)
AST SERPL-CCNC: 90 U/L (ref 10–40)
BASOPHILS # BLD AUTO: 0.04 K/UL (ref 0–0.2)
BASOPHILS NFR BLD: 0.6 % (ref 0–1.9)
BILIRUB SERPL-MCNC: 13.1 MG/DL (ref 0.1–1)
BUN SERPL-MCNC: 11 MG/DL (ref 8–23)
CALCIUM SERPL-MCNC: 8.8 MG/DL (ref 8.7–10.5)
CHLORIDE SERPL-SCNC: 102 MMOL/L (ref 95–110)
CO2 SERPL-SCNC: 23 MMOL/L (ref 23–29)
CREAT SERPL-MCNC: 0.7 MG/DL (ref 0.5–1.4)
DIFFERENTIAL METHOD: ABNORMAL
EOSINOPHIL # BLD AUTO: 0.1 K/UL (ref 0–0.5)
EOSINOPHIL NFR BLD: 1.9 % (ref 0–8)
ERYTHROCYTE [DISTWIDTH] IN BLOOD BY AUTOMATED COUNT: 15.6 % (ref 11.5–14.5)
EST. GFR  (AFRICAN AMERICAN): >60 ML/MIN/1.73 M^2
EST. GFR  (NON AFRICAN AMERICAN): >60 ML/MIN/1.73 M^2
GLUCOSE SERPL-MCNC: 103 MG/DL (ref 70–110)
HCT VFR BLD AUTO: 28.8 % (ref 37–48.5)
HGB BLD-MCNC: 9.9 G/DL (ref 12–16)
LIPASE SERPL-CCNC: 34 U/L (ref 4–60)
LYMPHOCYTES # BLD AUTO: 0.9 K/UL (ref 1–4.8)
LYMPHOCYTES NFR BLD: 13.1 % (ref 18–48)
MAGNESIUM SERPL-MCNC: 1.6 MG/DL (ref 1.6–2.6)
MCH RBC QN AUTO: 30.4 PG (ref 27–31)
MCHC RBC AUTO-ENTMCNC: 34.4 G/DL (ref 32–36)
MCV RBC AUTO: 88 FL (ref 82–98)
MONOCYTES # BLD AUTO: 0.6 K/UL (ref 0.3–1)
MONOCYTES NFR BLD: 8.2 % (ref 4–15)
NEUTROPHILS # BLD AUTO: 5.2 K/UL (ref 1.8–7.7)
NEUTROPHILS NFR BLD: 76.2 % (ref 38–73)
PHOSPHATE SERPL-MCNC: 2.6 MG/DL (ref 2.7–4.5)
PLATELET # BLD AUTO: 347 K/UL (ref 150–350)
PMV BLD AUTO: 10.5 FL (ref 9.2–12.9)
POTASSIUM SERPL-SCNC: 3.6 MMOL/L (ref 3.5–5.1)
PROT SERPL-MCNC: 6 G/DL (ref 6–8.4)
RBC # BLD AUTO: 3.26 M/UL (ref 4–5.4)
SODIUM SERPL-SCNC: 135 MMOL/L (ref 136–145)
WBC # BLD AUTO: 6.79 K/UL (ref 3.9–12.7)

## 2019-10-22 PROCEDURE — 63600175 PHARM REV CODE 636 W HCPCS: Performed by: INTERNAL MEDICINE

## 2019-10-22 PROCEDURE — 83690 ASSAY OF LIPASE: CPT

## 2019-10-22 PROCEDURE — 84100 ASSAY OF PHOSPHORUS: CPT

## 2019-10-22 PROCEDURE — 85025 COMPLETE CBC W/AUTO DIFF WBC: CPT

## 2019-10-22 PROCEDURE — 83735 ASSAY OF MAGNESIUM: CPT

## 2019-10-22 PROCEDURE — 80053 COMPREHEN METABOLIC PANEL: CPT

## 2019-10-22 PROCEDURE — 25000003 PHARM REV CODE 250: Performed by: STUDENT IN AN ORGANIZED HEALTH CARE EDUCATION/TRAINING PROGRAM

## 2019-10-22 RX ORDER — CHOLESTYRAMINE 4 G/9G
1 POWDER, FOR SUSPENSION ORAL 2 TIMES DAILY
Qty: 20 PACKET | Refills: 0 | Status: ON HOLD | OUTPATIENT
Start: 2019-10-22 | End: 2020-02-21 | Stop reason: HOSPADM

## 2019-10-22 RX ORDER — HYDROXYZINE HYDROCHLORIDE 25 MG/1
25 TABLET, FILM COATED ORAL 3 TIMES DAILY PRN
Qty: 30 TABLET | Refills: 0 | Status: SHIPPED | OUTPATIENT
Start: 2019-10-22 | End: 2019-11-01

## 2019-10-22 RX ORDER — AMOXICILLIN AND CLAVULANATE POTASSIUM 875; 125 MG/1; MG/1
1 TABLET, FILM COATED ORAL 2 TIMES DAILY
Qty: 20 TABLET | Refills: 0 | Status: SHIPPED | OUTPATIENT
Start: 2019-10-22 | End: 2019-11-01

## 2019-10-22 RX ORDER — PANTOPRAZOLE SODIUM 40 MG/1
40 TABLET, DELAYED RELEASE ORAL DAILY
Qty: 30 TABLET | Refills: 11 | Status: SHIPPED | OUTPATIENT
Start: 2019-10-23 | End: 2020-01-08

## 2019-10-22 RX ADMIN — PIPERACILLIN AND TAZOBACTAM 4.5 G: 4; .5 INJECTION, POWDER, LYOPHILIZED, FOR SOLUTION INTRAVENOUS; PARENTERAL at 04:10

## 2019-10-22 RX ADMIN — CHOLESTYRAMINE 4 G: 4 POWDER, FOR SUSPENSION ORAL at 09:10

## 2019-10-22 RX ADMIN — PANTOPRAZOLE SODIUM 40 MG: 40 TABLET, DELAYED RELEASE ORAL at 09:10

## 2019-10-22 RX ADMIN — DOCUSATE SODIUM 100 MG: 100 CAPSULE, LIQUID FILLED ORAL at 09:10

## 2019-10-22 RX ADMIN — VITAMIN D, TAB 1000IU (100/BT) 1000 UNITS: 25 TAB at 09:10

## 2019-10-22 RX ADMIN — AMLODIPINE BESYLATE 10 MG: 5 TABLET ORAL at 09:10

## 2019-10-22 RX ADMIN — LISINOPRIL 20 MG: 20 TABLET ORAL at 09:10

## 2019-10-22 RX ADMIN — PIPERACILLIN AND TAZOBACTAM 4.5 G: 4; .5 INJECTION, POWDER, LYOPHILIZED, FOR SOLUTION INTRAVENOUS; PARENTERAL at 11:10

## 2019-10-22 NOTE — NURSING
Pt AAO  VSS NAD.   Tele box removed. IV removed.   Educated pt on medications, when to take, how to take, side effects as per VN nurse.  Pt verbalized understanding  Waiting for transport

## 2019-10-22 NOTE — TELEPHONE ENCOUNTER
----- Message from Neelam Mattson sent at 10/22/2019 11:26 AM CDT -----  Contact: Self [phone]  Pt states she needs a hospital f/u she see Dr. Lu in the hospital please call back to discuss

## 2019-10-22 NOTE — PLAN OF CARE
VN cued into patients room for DC instructions. Discharge teaching was reviewed with patient and family. Pt verbalized understanding of changes to medications, FU appts, and clinical education. Refer to clinical references for pt education. Patient to inform bedside nurse when ready for wheelchair.

## 2019-10-22 NOTE — PROGRESS NOTES
"[facility] Progress Note    CC: jaundice    Interval History: Pt has no complaints this morning, no acute events overnight.  Pt tolerated procedure well.  Denies fever, chills, nausea, vomiting, abdominal pain, diarrhea, constipation.  She does report some reflux that has been present prior to admission.      Review of Systems  General ROS: negative for - chills, fever or weight loss, + jaundice, + scleral icterus  Cardiovascular ROS: no chest pain or dyspnea on exertion  Gastrointestinal ROS: no abdominal pain, change in bowel habits, or black/ bloody stools    Physical Examination  BP (!) 129/59   Pulse (!) 47   Temp 97.8 °F (36.6 °C)   Resp 18   Ht 6' 1" (1.854 m)   Wt 67.3 kg (148 lb 5.9 oz)   SpO2 97%   Breastfeeding? No   BMI 19.57 kg/m²   General appearance: alert, cooperative, no distress, jaundiced  HENT: Normocephalic, atraumatic, neck symmetrical, no nasal discharge, scleral icterus present  Lungs: clear to auscultation in all fields, symmetric chest wall expansion bilaterally, no wheeze, rale, or rhonchi  Heart: normal rate, regular rhythm without rub; palpable peripheral pulses  Abdomen: soft, epigastric tenderness to palpation; bowel sounds normoactive; no organomegaly  Extremities: extremities symmetric; no clubbing, cyanosis, or edema  Neurologic: Alert and oriented X 3, normal strength, normal coordination    Assessment:   75 yo F with history of gallbladder malignancy s/p chemo and radiation with new, worsening obstructive jaundice with associated changes in liver enzymes. She endorsed some wt loss and changes in urine, stools, skin and eyes, but denied abdominal pain, nausea, vomiting. WBC normal, no fever; no cholangitis. Liver changes likely related to metastatic disease or recurrent local malignancy.  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  A biliary sphincterotomy was performed.  Biopsy was " performed at the hepatic duct bifurcation.  One plastic stent was placed into the left and right hepatic duct.    Plan:  - follow up pathology results  - Recommend that pt has f/u appt with oncologist (known to Dr. Klaudia Walton on [facility]).  - Follow up with Dr. Lu and repeat ERCP in 6-8 weeks to exchange stent. If tissue confirmation is not definitive on path (and is desired), EUS tissue sampling can be performed at same session.  Would consider endobiliary RF ablation at time of next stent change (if tissue sampling is positive from ERCP)  - would recommend course of antibiotics for 7-10 days     Please call with questions.     Renee Coy, HO-1

## 2019-10-22 NOTE — DISCHARGE SUMMARY
[facility] Medicine Discharge Summary    Primary Team: [facility] Hospitalist Team B  Attending Physician: Josue Moreland MD  Resident: Dru Hurd DO  Intern: Marin Beach MD    Date of Admit: 10/18/2019  Date of Discharge: 10/22/2019    Discharge to: Home  Condition: Stable    Discharge Diagnoses     Patient Active Problem List   Diagnosis    Hiatal hernia    History of gallbladder cancer    Transaminitis    Jaundice    HTN (hypertension)    Obstructive jaundice     Consultants and Procedures     Consultants:  GI    Procedures:   ERCP    Imaging:  MRI MRCP    Brief History of Present Illness      Angelica Tomlinson is a 74 y.o. 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 The patient presented to [facility] on 10/18/2019 as a direct admission from [facility] with a primary complaint of painless jaundice and hyperbilirubinemia.     The patient was in their usual state of health until 1 week ago. Patient reports increasing painless jaundice of her eyes and arms for the past week. She notes her eyes are darker, her urine is darker, and her stools have become lighter beige color. Patient complains of gnawing abdominal pain in epigastric area but denies nausea or vomiting. Patient also reports decreased appetite and 9 lb weight loss in the past 2-3 weeks.      Patient has a history of locally advanced stage IIIB gallbladder cancer diagnosed initially in 2016. Patient was sent for resection however given perceived lymph node involvement Whipple was canceled. She was subsequently treated with chemo radiation. Patient recently established care with PCP who checked labs and found her alk-phos to be elevated in addition to her bilirubin. Given new findings she was referred to  GI for further evaluation today and direct admitted from GI clinic. Patient had been in remission according to patient and had not had chemotherapy or radiation in over 6 months.    Hospital Course By Problem with Pertinent Findings     Painless Jaundice with history of locally advanced stage IIIB gallbladder cancer  - 10/9/19 Bilirubin 10.3, ALP 1388, ,   - MRI/MRCP showed marked intrahepatic biliary dilatation with abrupt tapering near the confluence  - Tbili 20.3 Dbili >14 on admissions  - Admitted for further workup and likely that cancer has returned and will need to resume treatment  - Clear liquid diet; ERCP yesterday showed severe biliary strictures were found in the hepatic duct system (Bismuth II). The strictures were malignant appearing. Stents were placed in both L and R hepatic ducts.   - On PPx zosyn, PRN benadryl, cholestyramine for pruritis  - Bili trended down to 13.1 following ERCP  - Follow up with Dr. Lu in 1-2 weeks, repeat ERCP in 6-8 weeks  - Will discharge on 10 days augmentin, 10 days cholestyramine PRN, hydroxyzine PRN     Transaminitis  - 10/9 ,   -   on admission; downtrending AST 90 ALT 94 today  - ALP 1268 on admission; 961 today  - GGT 1610, lipase 34     Hyperbilirubinemia  - 10/9 bilirubin 10.3  - Tbili 20.3 and Dbili >14.0 on admission  - Tbili 17.8>13.1 today     Hypertension  - /70 on arrival  - Continued home lisinopril and amlodipine     Normocytic Anemia  - Hgb 11.9 on admission; MCV 90  - Hgb 10.5>9.9 today  - Continue to monitor; transfuse hgb<7     Thrombocytopenia  - Mildly elevated 416 on admission; 347 today     Constipation  - Continued home colace     Vitamin D deficiency  - Continued home Vitamin D    Discharge Medications      Angelica Tomlinson   Home Medication Instructions PRIYANKA:[id number]    Printed on:10/22/19 1030   Medication Information                      amLODIPine (NORVASC) 10 MG tablet  Take 1 tablet (10  mg total) by mouth once daily.             amoxicillin-clavulanate 875-125mg (AUGMENTIN) 875-125 mg per tablet  Take 1 tablet by mouth 2 (two) times daily. for 10 days             aspirin 81 MG Chew               cholestyramine (QUESTRAN) 4 gram packet  Take 1 packet (4 g total) by mouth 2 (two) times daily. for 10 days             docusate sodium (COLACE) 100 MG capsule  Take 1 capsule (100 mg total) by mouth 2 (two) times daily.             hydrOXYzine HCl (ATARAX) 25 MG tablet  Take 1 tablet (25 mg total) by mouth 3 (three) times daily as needed for Itching.             lisinopril (PRINIVIL,ZESTRIL) 20 MG tablet  Take 1 tablet (20 mg total) by mouth once daily.             pantoprazole (PROTONIX) 40 MG tablet  Take 1 tablet (40 mg total) by mouth once daily.             vitamin D 1000 units Tab  Take 185 mg by mouth once daily.               Discharge Information:   Diet:  Regular    Physical Activity:  As tolerated             Instructions:  1. Take all medications as prescribed  2. Keep all follow-up appointments  3. Return to the hospital or call your primary care physicians if any worsening symptoms such as fever, chest pain, shortness of breath, return of symptoms, or any other concerns.    Follow-Up Appointments:  10/24 with Heme/Onc Dr. Ngo to discuss oncology plan and if patient will stay with Dr. Ngo or previous oncologist  Referral to GI Dr. Luis Antonio Lu follow up in 1-2 weeks, repeat ERCP 6-8 weeks    Marin Beach MD  [facility] Internal Medicine, HO-1

## 2019-10-22 NOTE — PROGRESS NOTES
[facility] Resident CHERISE Progress Note    Subjective:      Angelica Tomlinson is a 74 y.o. female who is being followed by the [facility] service at [facility] for jaundice and hyperbilirubinemia.     Mrs. Tomlinson was resting comfortably in bed with her  at her side. She had no complaints of chest pain or SOB overnight. She denied any abdominal pain or nausea/vomiting. Patient did say she's having reflux that has been present since prior to admission.     Objective:   Last 24 Hour Vital Signs:  BP  Min: 110/71  Max: 162/74  Temp  Av.5 °F (36.4 °C)  Min: 96.7 °F (35.9 °C)  Max: 98.2 °F (36.8 °C)  Pulse  Av.8  Min: 47  Max: 69  Resp  Av.3  Min: 13  Max: 20  SpO2  Av.4 %  Min: 90 %  Max: 100 %  Weight  Av.3 kg (148 lb 5.9 oz)  Min: 67.3 kg (148 lb 5.9 oz)  Max: 67.3 kg (148 lb 5.9 oz)  I/O last 3 completed shifts:  In: 1000 [P.O.:500; IV Piggyback:500]  Out: 200 [Urine:200]    Physical Examination:  General: alert, cooperative, jaundice  HENT: Normocephalic, atraumatic, neck symmetrical, no nasal discharge  Eyes: jaundiced sclera noted bilaterally, PERRL, EOM's intact  Cardio: normal rate, regular rhythm without rub; palpable peripheral pulses  Lungs: clear to auscultation in all fields, no dullness to percussion bilaterally, no wheeze  Abdomen: soft, TTP RUQ and epigastric areas, non-distended; bowel sounds normoactive; no organomegaly  Extremities: extremities symmetric; no clubbing, cyanosis, or edema  Skin: Skin color, texture, turgor normal; no rashes; hair distrubution normal  Neurologic: A&Ox3, normal strength, normal coordination  Psychiatric: no pressured speech; normal affect; no evidence of impaired cognition     Laboratory:  Recent Labs   Lab 10/19/19  0544 10/20/19  0515 10/21/19  0528   WBC 7.07 6.92 6.21   HGB 10.5* 10.7* 10.5*   HCT 30.6* 31.6* 30.3*   * 369* 348   * 135* 135*   K 4.2 4.1 3.9    102 103   CREATININE 0.7 0.8 0.7   BUN 11 10 9    CO2 25 23 23   * 111* 114*   AST 91* 108* 129*     Radiology:  MRI/MRCP  -Marked intrahepatic biliary dilatation with abrupt tapering near the confluence.  Further evaluation otherwise limited in the absence of contrast with differential to include underlying stricture versus obstructing mass.  Correlation with ERCP advised.    ERCP  - The major papilla appeared normal.  - Severe biliary strictures were found in the hepatic duct system (Bismuth II). The strictures were malignant appearing.  - A biliary sphincterotomy was performed.  - Biopsy was performed at the hepatic duct bifurcation.  - One plastic stent was placed into the left hepatic duct.  - One plastic stent was placed into the right hepatic duct.    Current Medications:     Scheduled:   amLODIPine  10 mg Oral Daily    aspirin  81 mg Oral Once    cholestyramine  1 packet Oral BID    docusate sodium  100 mg Oral BID    lisinopril  20 mg Oral Daily    pantoprazole  40 mg Oral Daily    piperacillin-tazobactam (ZOSYN) IVPB  4.5 g Intravenous Q8H    vitamin D  1,000 Units Oral Daily        PRN:  hydrOXYzine HCl, sodium chloride 0.9%    Assessment:     Angelica Tomlinson is a 74 y.o.female with  Patient Active Problem List    Diagnosis Date Noted    Transaminitis 10/18/2019    Jaundice 10/18/2019    HTN (hypertension) 10/18/2019    Obstructive jaundice 10/18/2019    History of gallbladder cancer 10/09/2019    Hiatal hernia      Plan:     Painless Jaundice with history of locally advanced stage IIIB gallbladder cancer  - 10/9/19 Bilirubin 10.3, ALP 1388, ,   - MRI/MRCP showed marked intrahepatic biliary dilatation with abrupt tapering near the confluence  - Tbili 20.3 Dbili >14 on admissions  - Admitted for further workup and likely that cancer has returned and will need to resume treatment  - Clear liquid diet; ERCP yesterday showed severe biliary strictures were found in the hepatic duct system (Bismuth II). The strictures  were malignant appearing. Stents were placed in both L and R hepatic ducts.   - On PPx zosyn, PRN benadryl, cholestyramine for pruritis  - Continue to monitor     Transaminitis  - 10/9 ,   -   on admission;   today  - ALP 1268 on admission; 1029 yesterday  - GGT 1610    Hyperbilirubinemia  - 10/9 bilirubin 10.3  - Tbili 20.3 and Dbili >14.0 on admission  - Tbili 17.8 10/21; Continue to monitor     Hypertension  - /70 on arrival  - Continued home lisinopril and amlodipine, will monitor    Normocytic Anemia  - Hgb 11.9 on admission; MCV 90  - Hgb 10.5 10/21  - Continue to monitor; transfuse hgb<7    Thrombocytopenia  - Mildly elevated 416 on admission; 348 on 10/21, continue to monitor    Constipation  - Continued home colace, monitor     Vitamin D deficiency  - Continued home Vitamin D     Diet: Regular  Code: Full  PPx: Lovenox  Dispo: Completed workup for jaundice and hyperbilirubinemia; ERCP yesterday and likely discharge today    Marin Beach MD  [facility] HO-I  [facility] Hospitalist Team B     [facility] Medicine Hospitalist Pager numbers:   [facility] Hospitalist Medicine Team A (Pilar/Marky): [phone]  [facility] Hospitalist Medicine Team B (Gabby/Aniceto):  [phone]

## 2019-10-22 NOTE — DISCHARGE INSTRUCTIONS
Amoxicillin; Clavulanic Acid tablets (English) View Edit Remove  Cholestyramine powder for oral suspension (English) View Edit Remove  Hydroxyzine capsules or tablets (English) View Edit Remove  Pantoprazole tablets (English) View Edit Remove  ERCP, Discharge Instructions for (English) View Edit Remove

## 2019-10-22 NOTE — PLAN OF CARE
1900 reported discomfort to epigastric area.  Pt unable to describe pain.  It was not new and felt like hadal hernia. But pt was unable to say if the issue was GERD or generalized pain.  Dr Luis ordered famotidine; declined to order tylenol because of current liver issues. Other issue pt/family are having is noise from construction on 5th floor.  Nurse brought ear plugs to room.         Tele: none.     Bed in lowest position, wheels locked, non skid socks, ID band worn, personal items and call bell with in reach, bed alarm set.

## 2019-10-22 NOTE — PLAN OF CARE
Rounds completed on pt.  All questions addressed.  Bedside nurse to discuss d/c medications.  Discussed importance to attend all f/u appts and take medications as prescribed.  Verbalized understanding.    Follow up with Luis Antonio Lu MD   The office will call you to set up appointment date and time.  [address]   [facility] [place] [address]   [phone]     Oct24 Consult with Anisa Ngo MD   Thursday Oct 24, 2019 2:00 PM   Arrive at check-in approximately 15 minutes before your scheduled appointment time. Bring all outside medical records and imaging, along with a list of your current medications and insurance card.  Prepay due: Estimate unavailable   [address]  [facility]   [address] [facility] [address]  [place] [address]   [phone]    Oct30 Established Patient Visit with Miguel Barroso MD   Wednesday Oct 30, 2019 10:40 AM   Arrive at check-in approximately 15 minutes before your scheduled appointment time. Bring all outside medical records and imaging, along with a list of your current medications and insurance card.  Prepay due: Estimate unavailable   2nd Floor  [facility]   [address]  [place] [address]   [phone]    Nov11 Mammo Screening W [facility]   Monday Nov 11, 2019 2:30 PM   Please do not wear deodorant, powder, ointment, or skin product under the arm or on the breast the day of the test and wear a 2 piece outfit (no dresses). Please also bring any outside mammogram films on day of appointment.  Prepay due: Estimate unavailable   1st Floor [facility]   [address]  [place] [address]   [phone]           Bone Density   Monday Nov 11, 2019 3:00 PM   Do not wear underwire bras or pants with any metal or zippers when you go to your appointment.  Prepay due: $0.00   [facility] Floor  [facility]   [address]  [place] [address] [phone]         10/22/19 1134   Final Note    Assessment Type Final Discharge Note   Anticipated Discharge Disposition Home   Hospital Follow Up  Appt(s) scheduled? Yes   Discharge plans and expectations educations in teach back method with documentation complete? Yes   Right Care Referral Info   Post Acute Recommendation No Care     Alvaro Scott RN,   [phone]

## 2019-10-22 NOTE — PLAN OF CARE
Pt AAO x 4.  VSS.  Pt remained afebrile throughout this shift.   Pt remained free of falls this shift.   Pt c/o pain this shift.  PRN analgesics administered as ordered.   Plan of care reviewed. Patient verbalizes understanding.   Pt moving/turing independently.   Bed low, side rails up x 3, wheels locked, call light in reach.   Pt family member remained at bedside most of shift.   Bed alarm maintained for safety.   Patient instructed to call for assistance.   Hourly rounding completed.   Will continue to monitor.

## 2019-10-23 ENCOUNTER — PATIENT OUTREACH (OUTPATIENT)
Dept: ADMINISTRATIVE | Facility: CLINIC | Age: 74
End: 2019-10-23

## 2019-10-23 NOTE — TELEPHONE ENCOUNTER
C3 nurse attempted to contact patient. No answer. The following message was left for the patient to return the call:  Good afternoon  I am a nurse calling on behalf of [facility] from the Care Coordination Center.  This is a Transitional Care Call for Angelica Tomlinson. When you have a moment please contact us at [phone] or [phone] Monday through Friday, between the hours of 8 am to 4 pm. We look forward to speaking with you. On behalf of [facility] have a nice day.    The patient has a scheduled HOS appointment with Miguel Barroso MD  ON Oct 30, 2019 AT 10:40 AM

## 2019-10-23 NOTE — PATIENT INSTRUCTIONS
Discharge Instructions for ERCP (Endoscopic Retrograde Cholangiopancreatography)  You had a procedure known as an ERCP. Your healthcare provider performed the ERCP to look at your bile or pancreatic ducts, and to locate and treat blockages in the ducts. This procedure is used to diagnose diseases of the pancreas, bile ducts, and pancreatic duct, liver, and gallbladder. Heres what you need to do following your ERCP.  Home care  · Dont take aspirin or any other blood-thinning medicines (anticoagulants) until your provider says its OK.  · Your provider may prescribe an antibiotic, depending on what was done during the ERCP.  · You may have a sore throat for 1 to 2 days after the procedure. Use lozenges or gargle with salt water for your sore throat. If you're not better in a few days, call your provider.  · Rest, drink fluids, and eat light meals. If you feel bloated or have too much gas, use a heating pad on your belly to help reduce the discomfort. This should help you feel better. But if it doesn't, call your provider.  · Dont drink alcohol for 2 days after the procedure.  Follow-up care  Make a follow-up appointment as directed by our staff.     When to seek medical care  Call your provider right away if you have any of the following:  · Trouble swallowing or throat pain that gets worse   · Chest pain or severe belly or abdominal pain  · Fever above 100°F (37.7°C) or chills  · Upset stomach (nausea) and vomiting  · Black or tarry stools   Date Last Reviewed: 6/13/2015  © 5052-2812 [employer]. [address] [place] [address], [place] [address]. All rights reserved. This information is not intended as a substitute for professional medical care. Always follow your healthcare professional's instructions.

## 2019-10-24 ENCOUNTER — TELEPHONE (OUTPATIENT)
Dept: ENDOSCOPY | Facility: HOSPITAL | Age: 74
End: 2019-10-24

## 2019-10-24 ENCOUNTER — INITIAL CONSULT (OUTPATIENT)
Dept: HEMATOLOGY/ONCOLOGY | Facility: CLINIC | Age: 74
End: 2019-10-24
Payer: MEDICARE

## 2019-10-24 ENCOUNTER — LAB VISIT (OUTPATIENT)
Dept: LAB | Facility: HOSPITAL | Age: 74
End: 2019-10-24
Attending: INTERNAL MEDICINE
Payer: MEDICARE

## 2019-10-24 VITALS
TEMPERATURE: 99 F | OXYGEN SATURATION: 95 % | BODY MASS INDEX: 19.38 KG/M2 | DIASTOLIC BLOOD PRESSURE: 64 MMHG | SYSTOLIC BLOOD PRESSURE: 134 MMHG | HEART RATE: 111 BPM | HEIGHT: 71 IN | WEIGHT: 138.44 LBS

## 2019-10-24 DIAGNOSIS — Z85.09 HISTORY OF GALLBLADDER CANCER: Primary | Chronic | ICD-10-CM

## 2019-10-24 DIAGNOSIS — R93.89 ABNORMAL FINDING ON IMAGING: Primary | ICD-10-CM

## 2019-10-24 DIAGNOSIS — R74.01 TRANSAMINITIS: ICD-10-CM

## 2019-10-24 DIAGNOSIS — Z98.890 HISTORY OF BILIARY STENT INSERTION: ICD-10-CM

## 2019-10-24 DIAGNOSIS — Z85.09 HISTORY OF GALLBLADDER CANCER: Chronic | ICD-10-CM

## 2019-10-24 DIAGNOSIS — R17 JAUNDICE: ICD-10-CM

## 2019-10-24 LAB
ALBUMIN SERPL BCP-MCNC: 3 G/DL (ref 3.5–5.2)
ALP SERPL-CCNC: 970 U/L (ref 55–135)
ALT SERPL W/O P-5'-P-CCNC: 83 U/L (ref 10–44)
ANION GAP SERPL CALC-SCNC: 8 MMOL/L (ref 8–16)
AST SERPL-CCNC: 70 U/L (ref 10–40)
BASOPHILS # BLD AUTO: 0.09 K/UL (ref 0–0.2)
BASOPHILS NFR BLD: 1.1 % (ref 0–1.9)
BILIRUB SERPL-MCNC: 8.6 MG/DL (ref 0.1–1)
BUN SERPL-MCNC: 8 MG/DL (ref 8–23)
CALCIUM SERPL-MCNC: 9.9 MG/DL (ref 8.7–10.5)
CHLORIDE SERPL-SCNC: 103 MMOL/L (ref 95–110)
CO2 SERPL-SCNC: 28 MMOL/L (ref 23–29)
CREAT SERPL-MCNC: 0.7 MG/DL (ref 0.5–1.4)
DIFFERENTIAL METHOD: ABNORMAL
EOSINOPHIL # BLD AUTO: 0.2 K/UL (ref 0–0.5)
EOSINOPHIL NFR BLD: 2.4 % (ref 0–8)
ERYTHROCYTE [DISTWIDTH] IN BLOOD BY AUTOMATED COUNT: 17.4 % (ref 11.5–14.5)
EST. GFR  (AFRICAN AMERICAN): >60 ML/MIN/1.73 M^2
EST. GFR  (NON AFRICAN AMERICAN): >60 ML/MIN/1.73 M^2
GLUCOSE SERPL-MCNC: 117 MG/DL (ref 70–110)
HCT VFR BLD AUTO: 35.9 % (ref 37–48.5)
HGB BLD-MCNC: 11.9 G/DL (ref 12–16)
IMM GRANULOCYTES # BLD AUTO: 0.04 K/UL (ref 0–0.04)
IMM GRANULOCYTES NFR BLD AUTO: 0.5 % (ref 0–0.5)
LYMPHOCYTES # BLD AUTO: 1.4 K/UL (ref 1–4.8)
LYMPHOCYTES NFR BLD: 16.6 % (ref 18–48)
MCH RBC QN AUTO: 30.3 PG (ref 27–31)
MCHC RBC AUTO-ENTMCNC: 33.1 G/DL (ref 32–36)
MCV RBC AUTO: 91 FL (ref 82–98)
MONOCYTES # BLD AUTO: 0.9 K/UL (ref 0.3–1)
MONOCYTES NFR BLD: 10.1 % (ref 4–15)
NEUTROPHILS # BLD AUTO: 5.8 K/UL (ref 1.8–7.7)
NEUTROPHILS NFR BLD: 69.3 % (ref 38–73)
NRBC BLD-RTO: 0 /100 WBC
PLATELET # BLD AUTO: 351 K/UL (ref 150–350)
PMV BLD AUTO: 10.1 FL (ref 9.2–12.9)
POTASSIUM SERPL-SCNC: 4.4 MMOL/L (ref 3.5–5.1)
PROT SERPL-MCNC: 7.4 G/DL (ref 6–8.4)
RBC # BLD AUTO: 3.93 M/UL (ref 4–5.4)
SODIUM SERPL-SCNC: 139 MMOL/L (ref 136–145)
WBC # BLD AUTO: 8.42 K/UL (ref 3.9–12.7)

## 2019-10-24 PROCEDURE — 99205 OFFICE O/P NEW HI 60 MIN: CPT | Mod: S$GLB,,, | Performed by: INTERNAL MEDICINE

## 2019-10-24 PROCEDURE — 99999 PR PBB SHADOW E&M-EST. PATIENT-LVL III: CPT | Mod: PBBFAC,,, | Performed by: INTERNAL MEDICINE

## 2019-10-24 PROCEDURE — 1101F PR PT FALLS ASSESS DOC 0-1 FALLS W/OUT INJ PAST YR: ICD-10-PCS | Mod: CPTII,S$GLB,, | Performed by: INTERNAL MEDICINE

## 2019-10-24 PROCEDURE — 36415 COLL VENOUS BLD VENIPUNCTURE: CPT

## 2019-10-24 PROCEDURE — 80053 COMPREHEN METABOLIC PANEL: CPT

## 2019-10-24 PROCEDURE — 99205 PR OFFICE/OUTPT VISIT, NEW, LEVL V, 60-74 MIN: ICD-10-PCS | Mod: S$GLB,,, | Performed by: INTERNAL MEDICINE

## 2019-10-24 PROCEDURE — 99999 PR PBB SHADOW E&M-EST. PATIENT-LVL III: ICD-10-PCS | Mod: PBBFAC,,, | Performed by: INTERNAL MEDICINE

## 2019-10-24 PROCEDURE — 1101F PT FALLS ASSESS-DOCD LE1/YR: CPT | Mod: CPTII,S$GLB,, | Performed by: INTERNAL MEDICINE

## 2019-10-24 PROCEDURE — 85025 COMPLETE CBC W/AUTO DIFF WBC: CPT

## 2019-10-24 RX ORDER — POLYETHYLENE GLYCOL 3350 17 G/17G
17 POWDER, FOR SOLUTION ORAL EVERY MORNING
COMMUNITY

## 2019-10-24 NOTE — TELEPHONE ENCOUNTER
----- Message from Luis Antonio Lu MD sent at 10/24/2019  3:52 PM CDT -----  Thanks Laya. I did not realize you reviewed the case. May be good in the future for the pathologist to document review by a 2nd specialist, so that I don't send you unnecessary messages :)    Shi- Please arrange for an EUS-FNA. We just did ERCP with stents, so I think only an EUS will be fine as long as we can get it scheduled soon (try for tomorrow with me at [place]).      ----- Message -----  From: Laya Brown MD  Sent: 10/24/2019   1:30 PM CDT  To: Cyndie Avina MD, Luis Antonio Lu MD    Hi Luis Antonio,    I saw this biopsy as a consult. There is really not much in the biopsy. Slightly atypical but that's it. There is some suggestion of fibrosis could be treatment related. How's  trending? If there is high clinical suspicion, I would recommend repeat.    Laya    ----- Message -----  From: Luis Antonio Lu MD  Sent: 10/24/2019   1:17 PM CDT  To: Cyndie Avina MD, MD Laya Bustamante/Cyndie- Can one of you review this case? She had hx of GB cancer only treated with chemo. Presented recently with obstructive jaundice. Suspicious stricture on ERCP. I sampled with an endobiliary forceps. Looks like it was read by a single pathologist (don't know if GI trained or not). If you agree that bx is inconclusive, I'll bring her back sooner for repeat sampling with both EUS and ERCP. Thanks.

## 2019-10-24 NOTE — Clinical Note
Schedule f/u in 2 weeks [facility] may follow up there/

## 2019-10-24 NOTE — Clinical Note
October 27, 2019      Miguel Barroso MD  [address]  [place] [address]           [facility]  [address]   [place] [address]  Phone: [phone]          Patient: Angelica Tomlinson   MR Number: [id number]   YOB: 1945   Date of Visit: 10/24/2019       Dear Dr. Miguel Barroso:    Thank you for referring Angelica Tomlinson to me for evaluation. Attached you will find relevant portions of my assessment and plan of care.    If you have questions, please do not hesitate to call me. I look forward to following Angelica Tomlinson along with you.    Sincerely,    Anisa Ngo MD    Enclosure  CC:  No Recipients    If you would like to receive this communication electronically, please contact [email or web address] or [phone] to request more information on [employer] access.    For providers and/or their staff who would like to refer a patient to [facility], please contact us through our one-stop-shop provider referral line, Joan Duartege, at [phone].    If you feel you have received this communication in error or would no longer like to receive these types of communications, please e-mail [email or web address]

## 2019-10-24 NOTE — PROGRESS NOTES
Subjective:       Patient ID: Angelica Tomlinson is a 74 y.o. female.    Chief Complaint: Consult    HPI   REASON FOR CONSULTATION: Locally  advanced stage IIIB gallbladder cancer initially diagnosed in 2016 s/p chemo and radiation  REFERRING PHYSICIAN: Miguel Barroso MD    Patient  is a 74-year-old -American woman seen today for history of locally advanced stage IIIB gallbladder cancer .She is s/p exploratory laparoscopy for a likely gallbladder malignancy growing into the jodie hepatis as well as into the duodenum on 5/16/16. Initial surgical intervention planned was  Whipple procedure as it was appearing to be a duodenal mass that was resectable. Upon initiation of resection, Dr. Scott found some evidence of metastasis and did not perform the Whipple. She subsequently  received concurrent chemotherapy and radiation with CI 5FU.She finished her last cycle of treatment on August4, 2017. Her radiation therapy ended on August7, 2017. She finished her last cycle of treatment on August4, 2017. Her radiation therapy ended on August7, 2017.  Patient is a poor historian.  She is followed at [facility] by oncologist Dr. Estelle Walton   She reports her appetite and weight has been diminished for the past 2-3 weeks.  She has chronic intermittent left upper quadrant pain which has been stable since her diagnosis of gallbladder cancer which has been relieved by Her current pain management She was recently hospitalized on 10/18/2019  with jaundice and hyperbilirubinemia.   She  reports a change in the color of her stools 2 weeks ago.  She noticed [employer] colored urine 1 week prior to recent hospitalization. Workup during recent hospitalization included MRI with MRCP w/out contrast 10/18/2019 which showed Marked intrahepatic biliary dilatation with abrupt tapering near the confluence. Tbili 20.3 Dbili >14 on admission.ERCP showed severe biliary strictures were found in the hepatic duct system  (Bismuth II). The strictures were malignant appearing. Stents were placed in both L and R hepatic ducts. Bili trended down to 13.1 .Pathology of BILE DUCT, BIOPSY showed Detached fragments of fibrin and biliary epithelium with focal cytologic atypia: Transaminases    on admission; downtrending AST 90 ALT 94 upon discharge.Plan was for follow up with Dr. Lu in 1-2 weeks for repeat ERCP She was discharged on a course of 10 days augmentin.  Patient is unclear whether she is continuing care at [place] or whether she has transferrin care  care to this facility.  She is here for further evaluation                              Painless Jaundice with history of locally advanced stage IIIB gallbladder cancer  - 10/9/19 Bilirubin 10.3, ALP 1388, ,   - MRI/MRCP showed marked intrahepatic biliary dilatation with abrupt tapering near the confluence  - Tbili 20.3 Dbili >14 on admissions  - Admitted for further workup and likely that cancer has returned and will need to resume treatment  - Clear liquid diet; ERCP yesterday showed severe biliary strictures were found in the hepatic duct system (Bismuth II). The strictures were malignant appearing. Stents were placed in both L and R hepatic ducts.   - On PPx zosyn, PRN benadryl, cholestyramine for pruritis  - Bili trended down to 13.1 following ERCP  - Follow up with Dr. Lu in 1-2 weeks, repeat ERCP in 6-8 weeks  - Will discharge on 10 days augmentin, 10 days cholestyramine PRN, hydroxyzine PRN     Transaminitis  - 10/9 ,   -   on admission; downtrending AST 90 ALT 94 today  - ALP 1268 on admission; 961 today  - GGT 1610, lipase 34     Hyperbilirubinemia  - 10/9 bilirubin 10.3  - Tbili 20.3 and Dbili >14.0 on admission  - Tbili 17.8>13.1 today     Hypertension  - /70 on arrival  - Continued home lisinopril and amlodipine     Normocytic Anemia  - Hgb 11.9 on admission; MCV 90  - Hgb 10.5>9.9  "today  - Continue to monitor; transfuse hgb<7     Thrombocytopenia  - Mildly elevated 416 on admission; 347 today     Constipation  - Continued home colace     Vitamin D deficiency  - Continued home Vitamin D        Past Medical History:   Diagnosis Date    Cancer     Gallbladder    Constipation     Cyst of breast, left, benign solitary     Diverticulosis     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 Negative for fever.   HENT: Negative for mouth sores.    Eyes: Negative for visual disturbance.   Respiratory: Negative for cough and shortness of breath.    Cardiovascular: Negative for chest pain.   Gastrointestinal: Negative for abdominal pain and diarrhea.   Genitourinary: Negative for frequency.   Musculoskeletal: Negative for back pain.   Skin: Negative for rash.   Neurological: Negative for headaches.   Hematological: Negative for adenopathy.   Psychiatric/Behavioral: The patient is not nervous/anxious.        Objective:       Vitals:    10/24/19 1401   BP: 134/64   BP Location: Right arm   Patient Position: Sitting   BP Method: Medium (Automatic)   Pulse: (!) 111   Temp: 98.6 °F (37 °C)   TempSrc: Oral   SpO2: 95%   Weight: 62.8 kg (138 lb 7.2 oz)   Height: 5' 10.5" (1.791 m)       Physical Exam   Constitutional: She is oriented to person, place, and time. She appears well-developed and well-nourished.   HENT:   Head: Normocephalic.   Mouth/Throat: Oropharynx is clear and moist. No oropharyngeal exudate.   Eyes: Pupils are equal, round, and reactive to light. Conjunctivae and lids are normal. No scleral icterus.   Neck: Normal range of motion. Neck supple. No thyromegaly present.   Cardiovascular: Normal rate, regular rhythm and normal heart sounds.   No murmur heard.  Pulmonary/Chest: Breath sounds " normal. She has no wheezes. She has no rales.   Abdominal: Soft. Bowel sounds are normal. She exhibits no distension and no mass. There is no hepatosplenomegaly. There is no tenderness. There is no rebound and no guarding.   Musculoskeletal: Normal range of motion. She exhibits no edema or tenderness.   Lymphadenopathy:     She has no cervical adenopathy.     She has no axillary adenopathy.        Right: No supraclavicular adenopathy present.        Left: No supraclavicular adenopathy present.   Neurological: She is alert and oriented to person, place, and time. No cranial nerve deficit. Coordination normal.   Skin: Skin is warm and dry. No ecchymosis, no petechiae and no rash noted. No erythema.   Psychiatric: She has a normal mood and affect.         She underwent a repeat CT chest abdomen pelvis with contrast on August 2, 2019 and it showed:   1. Stable subcentimeter pleural and subpleural nodules bilaterally. No new nodules or interval enlargement is seen to suggest metastatic involvement. One of the nodules represents a calcified granuloma and there are RAD additional mild pleural calcifications on the right.  2. Status post cholecystectomy. The pattern of mild central ductal dilatation within the liver and extrahepatic is mildly increased in the interval, primarily affecting the left hepatic lobe. There is breathing motion averaging ducts with parenchyma, but without a discrete mass suggest local recurrence at this time. Also, the mildly greater extra hepatic ductal dilatation would not suggest locally recurrent tumor. Continued long-term monitoring is suggested.  3. Stable mildly lobular contour of the left adrenal gland.  4. Probable small fundal uterine fibroids.          MRI with MRCP w/out contrast 10/18/2019   Marked intrahepatic biliary dilatation with abrupt tapering near the confluence.  Further evaluation otherwise limited in the absence of contrast with differential to include underlying stricture  versus obstructing mass.  Correlation with ERCP advised.          FINAL PATHOLOGIC DIAGNOSIS 10/21/2019  BILE DUCT, BIOPSY:  - Detached fragments of fibrin and biliary epithelium with focal cytologic atypia  - Fibrous stroma with marked surface denudation  - See comment  Comment:  Multiple deeper levels examined. There are focal clusters and single cells of biliary epithelium admixed with and  adjacent to detached pieces of fibrin. These cells display moderate nuclear pleomorphism and hyperchromasia, but  still retain a fair amount of cytoplasm. The histologic findings are atypical, especially in the context of this patient's  history of gallbladder carcinoma. However, further classification is precluded due to the limited amount of tissue  present for evaluation. If clinical suspicion persists, resampling is recommended  Assessment:       1. History of gallbladder cancer    2. Transaminitis    3. Jaundice    4. History of biliary stent insertion        Plan:       ECOG 0.  1-4 75 y/o with history of locally advanced stage IIIB gallbladder cancer 2016 treated with   concurrent chemotherapy and radiation with 5-FU completed August 2017 recently hospitalized with Painless obstructive  Jaundice.Workup during hospitalization showed suspicious stricture on ERCP. Pathology of bile duct biopsy inconclusive. . Pathology is in process of being reviewed with GI trained pathology . If it is agreed that bx is  inconclusive, plan is for  repeat sampling with both EUS and ERCP. Discussed radiographic and pathology findings in detail with patient.  She will need to undergo close follow-up with Dr. Luis Antonio Lu for repeat ERCP/EUS if biopsy remains inconclusive.  Discussed with Dr. Lu and  he is in agreement and will arrange close follow-up.  Treatment plan will be determined pending final pathology findings  Patient is in process of determining where she will ultimately  undergo follow-up for her cancer history.       Cc: Miguel DIAL  MD Luis Antonio Barroso MD Nelly Aoun, MD

## 2019-10-25 ENCOUNTER — ANESTHESIA (OUTPATIENT)
Dept: ENDOSCOPY | Facility: HOSPITAL | Age: 74
End: 2019-10-25
Payer: MEDICARE

## 2019-10-25 ENCOUNTER — HOSPITAL ENCOUNTER (OUTPATIENT)
Facility: HOSPITAL | Age: 74
Discharge: HOME | End: 2019-10-25
Attending: INTERNAL MEDICINE | Admitting: INTERNAL MEDICINE
Payer: MEDICARE

## 2019-10-25 ENCOUNTER — ANESTHESIA EVENT (OUTPATIENT)
Dept: ENDOSCOPY | Facility: HOSPITAL | Age: 74
End: 2019-10-25
Payer: MEDICARE

## 2019-10-25 DIAGNOSIS — K83.1 OBSTRUCTIVE JAUNDICE: Primary | ICD-10-CM

## 2019-10-25 DIAGNOSIS — K83.1 BILIARY OBSTRUCTION: ICD-10-CM

## 2019-10-25 PROCEDURE — 88173 CYTOPATH EVAL FNA REPORT: CPT | Mod: 26,,, | Performed by: PATHOLOGY

## 2019-10-25 PROCEDURE — 43239 EGD BIOPSY SINGLE/MULTIPLE: CPT | Mod: 59,,, | Performed by: INTERNAL MEDICINE

## 2019-10-25 PROCEDURE — 43239 PR EGD, FLEX, W/BIOPSY, SGL/MULTI: ICD-10-PCS | Mod: 59,,, | Performed by: INTERNAL MEDICINE

## 2019-10-25 PROCEDURE — 88305 CYTOLOGY SPECIMEN- FNA RADIOLOGY GUIDED, BRONCH/EBUS, EUS/GI: ICD-10-PCS | Mod: 26,,, | Performed by: PATHOLOGY

## 2019-10-25 PROCEDURE — 88305 TISSUE EXAM BY PATHOLOGIST: CPT | Mod: 59 | Performed by: PATHOLOGY

## 2019-10-25 PROCEDURE — 88342 CYTOLOGY SPECIMEN- FNA RADIOLOGY GUIDED, BRONCH/EBUS, EUS/GI: ICD-10-PCS | Mod: 26,,, | Performed by: PATHOLOGY

## 2019-10-25 PROCEDURE — 88342 IMHCHEM/IMCYTCHM 1ST ANTB: CPT | Mod: 59 | Performed by: PATHOLOGY

## 2019-10-25 PROCEDURE — 88172 CYTOLOGY SPECIMEN- FNA RADIOLOGY GUIDED, BRONCH/EBUS, EUS/GI: ICD-10-PCS | Mod: 26,,, | Performed by: PATHOLOGY

## 2019-10-25 PROCEDURE — 88305 TISSUE EXAM BY PATHOLOGIST: CPT | Performed by: PATHOLOGY

## 2019-10-25 PROCEDURE — 37000008 HC ANESTHESIA 1ST 15 MINUTES: Performed by: INTERNAL MEDICINE

## 2019-10-25 PROCEDURE — 88305 TISSUE EXAM BY PATHOLOGIST: CPT | Mod: 26,,, | Performed by: PATHOLOGY

## 2019-10-25 PROCEDURE — 43242 EGD US FINE NEEDLE BX/ASPIR: CPT | Mod: ,,, | Performed by: INTERNAL MEDICINE

## 2019-10-25 PROCEDURE — 63600175 PHARM REV CODE 636 W HCPCS: Performed by: NURSE ANESTHETIST, CERTIFIED REGISTERED

## 2019-10-25 PROCEDURE — 88172 CYTP DX EVAL FNA 1ST EA SITE: CPT | Mod: 26,,, | Performed by: PATHOLOGY

## 2019-10-25 PROCEDURE — 63600175 PHARM REV CODE 636 W HCPCS: Performed by: INTERNAL MEDICINE

## 2019-10-25 PROCEDURE — 88173 CYTOLOGY SPECIMEN- FNA RADIOLOGY GUIDED, BRONCH/EBUS, EUS/GI: ICD-10-PCS | Mod: 26,,, | Performed by: PATHOLOGY

## 2019-10-25 PROCEDURE — 43242 EGD US FINE NEEDLE BX/ASPIR: CPT | Performed by: INTERNAL MEDICINE

## 2019-10-25 PROCEDURE — 88172 CYTP DX EVAL FNA 1ST EA SITE: CPT | Performed by: PATHOLOGY

## 2019-10-25 PROCEDURE — 88305 TISSUE SPECIMEN TO PATHOLOGY - SURGERY: ICD-10-PCS | Mod: 26,,, | Performed by: PATHOLOGY

## 2019-10-25 PROCEDURE — 37000009 HC ANESTHESIA EA ADD 15 MINS: Performed by: INTERNAL MEDICINE

## 2019-10-25 PROCEDURE — 43239 EGD BIOPSY SINGLE/MULTIPLE: CPT | Performed by: INTERNAL MEDICINE

## 2019-10-25 PROCEDURE — 43242 PR UPGI ENDOSCOPY,FN NEEDLE BX,GUIDED: ICD-10-PCS | Mod: ,,, | Performed by: INTERNAL MEDICINE

## 2019-10-25 PROCEDURE — 27201012 HC FORCEPS, HOT/COLD, DISP: Performed by: INTERNAL MEDICINE

## 2019-10-25 PROCEDURE — 88342 IMHCHEM/IMCYTCHM 1ST ANTB: CPT | Mod: 26,,, | Performed by: PATHOLOGY

## 2019-10-25 PROCEDURE — 27202059 HC NEEDLE, FNA (ANY): Performed by: INTERNAL MEDICINE

## 2019-10-25 PROCEDURE — 43238 EGD US FINE NEEDLE BX/ASPIR: CPT | Performed by: INTERNAL MEDICINE

## 2019-10-25 RX ORDER — SODIUM CHLORIDE 9 MG/ML
INJECTION, SOLUTION INTRAVENOUS CONTINUOUS
Status: DISCONTINUED | OUTPATIENT
Start: 2019-10-25 | End: 2020-02-22

## 2019-10-25 RX ORDER — SODIUM CHLORIDE 0.9 % (FLUSH) 0.9 %
10 SYRINGE (ML) INJECTION
Status: ACTIVE | OUTPATIENT
Start: 2019-10-25

## 2019-10-25 RX ORDER — LIDOCAINE HCL/PF 100 MG/5ML
SYRINGE (ML) INTRAVENOUS
Status: DISCONTINUED | OUTPATIENT
Start: 2019-10-25 | End: 2019-10-25

## 2019-10-25 RX ORDER — HYDROMORPHONE HYDROCHLORIDE 2 MG/ML
1 INJECTION, SOLUTION INTRAMUSCULAR; INTRAVENOUS; SUBCUTANEOUS ONCE
Status: COMPLETED | OUTPATIENT
Start: 2019-10-25 | End: 2019-10-25

## 2019-10-25 RX ORDER — PROPOFOL 10 MG/ML
VIAL (ML) INTRAVENOUS
Status: DISCONTINUED | OUTPATIENT
Start: 2019-10-25 | End: 2019-10-25

## 2019-10-25 RX ORDER — PROPOFOL 10 MG/ML
VIAL (ML) INTRAVENOUS CONTINUOUS PRN
Status: DISCONTINUED | OUTPATIENT
Start: 2019-10-25 | End: 2019-10-25

## 2019-10-25 RX ADMIN — HYDROMORPHONE HYDROCHLORIDE 1 MG: 2 INJECTION, SOLUTION INTRAMUSCULAR; INTRAVENOUS; SUBCUTANEOUS at 12:10

## 2019-10-25 RX ADMIN — PROPOFOL 150 MCG/KG/MIN: 10 INJECTION, EMULSION INTRAVENOUS at 11:10

## 2019-10-25 RX ADMIN — PROPOFOL 70 MG: 10 INJECTION, EMULSION INTRAVENOUS at 11:10

## 2019-10-25 RX ADMIN — SODIUM CHLORIDE: 0.9 INJECTION, SOLUTION INTRAVENOUS at 10:10

## 2019-10-25 RX ADMIN — LIDOCAINE HYDROCHLORIDE 75 MG: 20 INJECTION, SOLUTION INTRAVENOUS at 11:10

## 2019-10-25 NOTE — ANESTHESIA PREPROCEDURE EVALUATION
10/25/2019    Angelica Tomlinson is a 74 y.o. female here for repeat ERCP. Underwent ERCP on 10/21/19 with [facility]    Past Medical History:   Diagnosis Date    Cancer     Gallbladder    Constipation     Cyst of breast, left, benign solitary     Diverticulosis     Duodenal mass 5/16/2016    Helicobacter pylori gastritis     Hiatal hernia     Tobacco abuse 10/9/2019       Patient Active Problem List   Diagnosis    Hiatal hernia    History of gallbladder cancer    Transaminitis    Jaundice    HTN (hypertension)    Obstructive jaundice       Past Surgical History:   Procedure Laterality Date    APPENDECTOMY      colon polyps      ERCP N/A 10/21/2019    Procedure: ERCP (ENDOSCOPIC RETROGRADE CHOLANGIOPANCREATOGRAPHY);  Surgeon: Luis Antonio Lu MD;  Location: [facility];  Service: Endoscopy;  Laterality: N/A;    ERCP W/ PLASTIC STENT PLACEMENT  10/21/2019       Vital Signs Range (Last 24H):  Temp:  [37 °C (98.6 °F)]   Pulse:  [111]   BP: (134)/(64)   SpO2:  [95 %]       CBC:   Recent Labs     10/22/19  0751 10/24/19  1508   WBC 6.79 8.42   RBC 3.26* 3.93*   HGB 9.9* 11.9*   HCT 28.8* 35.9*    351*   MCV 88 91   MCH 30.4 30.3   MCHC 34.4 33.1         Recent Labs     10/22/19  0751 10/24/19  1508   * 139   K 3.6 4.4    103   CO2 23 28   BUN 11 8   CREATININE 0.7 0.7    117*   MG 1.6  --    PHOS 2.6*  --    CALCIUM 8.8 9.9   ALBUMIN 2.3* 3.0*   PROT 6.0 7.4   ALKPHOS 961* 970*   ALT 94* 83*   AST 90* 70*   BILITOT 13.1* 8.6*       Anesthesia Evaluation    I have reviewed the Patient Summary Reports.     I have reviewed the Medications.     Review of Systems  Anesthesia Hx:  No problems with previous Anesthesia  History of prior surgery of interest to airway management or planning:  Denies Personal Hx of Anesthesia complications.   Social:  No Alcohol Use, Smoker     Hematology/Oncology:         -- Anemia: Current/Recent Cancer.   Cardiovascular:   Exercise tolerance: good Hypertension, well controlled    Renal/:  Renal/ Normal     Hepatic/GI:   Hiatal Hernia, GERD Gallbladder cancer stage 3b  Transaminitis   Neurological:  Neurology Normal    Endocrine:  Endocrine Normal        Physical Exam  General:  Well nourished    Airway/Jaw/Neck:  Airway Findings: Mouth Opening: Normal Tongue: Normal  General Airway Assessment: Adult  Mallampati: III  Improves to II with phonation.  TM Distance: Normal, at least 6 cm  Jaw/Neck Findings:  Neck ROM: Normal ROM      Dental:  Dental Findings: Upper Dentures, Lower Dentures    Chest/Lungs:  Chest/Lungs Findings: Clear to auscultation, Normal Respiratory Rate     Heart/Vascular:  Heart Findings: Rate: Normal  Rhythm: Regular Rhythm  Sounds: Normal        Mental Status:  Mental Status Findings:  Cooperative, Alert and Oriented         Anesthesia Plan  Type of Anesthesia, risks & benefits discussed:  Anesthesia Type:  MAC  Patient's Preference:   Intra-op Monitoring Plan: standard ASA monitors  Intra-op Monitoring Plan Comments:   Post Op Pain Control Plan: per primary service following discharge from PACU  Post Op Pain Control Plan Comments:   Induction:   IV  Beta Blocker:  Patient is not currently on a Beta-Blocker (No further documentation required).       Informed Consent: Patient understands risks and agrees with Anesthesia plan.  Questions answered. Anesthesia consent signed with patient.  ASA Score: 3     Day of Surgery Review of History & Physical:    H&P update referred to the surgeon.         Ready For Surgery From Anesthesia Perspective.

## 2019-10-25 NOTE — [employer] PATIENT INSTRUCTIONS
Discharge Summary/Instructions after an Endoscopic Procedure  Patient Name: Angelica Tomlinson  Patient MRN: [id number]  Patient YOB: 1945 Friday, October 25, 2019  Luis Antonio Lu MD  RESTRICTIONS:  During your procedure today, you received medications for sedation.  These   medications may affect your judgment, balance and coordination.  Therefore,   for 24 hours, you have the following restrictions:   - DO NOT drive a car, operate machinery, make legal/financial decisions,   sign important papers or drink alcohol.    ACTIVITY:  Today: no heavy lifting, straining or running due to procedural   sedation/anesthesia.  The following day: return to full activity including work.  DIET:  Eat and drink normally unless instructed otherwise.     TREATMENT FOR COMMON SIDE EFFECTS:  - Mild abdominal pain, nausea, belching, bloating or excessive gas:  rest,   eat lightly and use a heating pad.  - Sore Throat: treat with throat lozenges and/or gargle with warm salt   water.  - Because air was used during the procedure, expelling large amounts of air   from your rectum or belching is normal.  - If a bowel prep was taken, you may not have a bowel movement for 1-3 days.    This is normal.  SYMPTOMS TO WATCH FOR AND REPORT TO YOUR PHYSICIAN:  1. Abdominal pain or bloating, other than gas cramps.  2. Chest pain.  3. Back pain.  4. Signs of infection such as: chills or fever occurring within 24 hours   after the procedure.  5. Rectal bleeding, which would show as bright red, maroon, or black stools.   (A tablespoon of blood from the rectum is not serious, especially if   hemorrhoids are present.)  6. Vomiting.  7. Weakness or dizziness.  GO DIRECTLY TO THE NEAREST EMERGENCY ROOM IF YOU HAVE ANY OF THE FOLLOWING:      Difficulty breathing              Chills and/or fever over 101 F   Persistent vomiting and/or vomiting blood   Severe abdominal pain   Severe chest pain   Black, tarry stools   Bleeding- more than one tablespoon   Any  other symptom or condition that you feel may need urgent attention  Your doctor recommends these additional instructions:  If any biopsies were taken, your doctors clinic will contact you in 1 to 2   weeks with any results.  - Discharge patient to home (ambulatory).   - Patient has a contact number available for emergencies.  The signs and   symptoms of potential delayed complications were discussed with the   patient.  Return to normal activities tomorrow.  Written discharge   instructions were provided to the patient.   - Resume previous diet.   - Await cytology results.   - Return to referring physician as previously scheduled.  For questions, problems or results please call your physician - Luis Antonio Lu MD at Work:  [phone].  EMERGENCY PHONE NUMBER: [phone],  LAB RESULTS: [phone]  IF A COMPLICATION OR EMERGENCY SITUATION ARISES AND YOU ARE UNABLE TO REACH   YOUR PHYSICIAN - GO DIRECTLY TO THE EMERGENCY ROOM.  Luis Antonio Lu MD  10/25/2019 12:30:18 PM  This report has been verified and signed electronically.  [employer]

## 2019-10-25 NOTE — DISCHARGE INSTRUCTIONS
Endoscopic Ultrasound (EUS)    An endoscopic ultrasound (EUS) is a test to look at the inside of your gastrointestinal (GI) tract. It's commonly used to look for cancers or growths in the esophagus, stomach, pancreas, liver, and rectum. It can help to stage cancer (see how advanced a cancer is). EUS may also be used to help diagnose certain diseases or to drain cysts or abscesses.  What is EUS?  EUS shows both ultrasound images and live video of the GI tract. During the test, a flexible tube called an endoscope (scope) is used. At the end of the scope is a tiny video camera and light. The video camera sends live images to a monitor. The scope also contains a very small ultrasound device. This uses sound waves to create images and send them to a monitor.  A needle is passed through the scope. The needle can be used take a small sample of tissue for testing. This is called a biopsy. The needle can be used to take a sample of fluid. This is called fine-needle aspiration (FNA).  Risks and possible complications of EUS  Risks and possible complications include the following:  · Bleeding  · Infection  · A perforation (hole) in the digestive tract   · Risks of sedation or anesthesia   Before the test  Be prepared prior to the test:  · Tell your healthcare provider what medicine you take. This includes vitamins, herbs, and over-the-counter medicine. It also includes any blood thinners, such as warfarin, clopidogrel, ibuprofen, or daily aspirin. Ask your healthcare provider if you need to stop taking some or all of them before the test.  · You may be prescribed antibiotics to take before or after the test. This depends on the area being studied and what is done during the test. These medicines help prevent infection.  · Carefully follow the instructions for preparing for the test to make sure results are accurate. Instructions may include:  ¨ If youre having an EUS of the upper GI tract (esophagus, stomach, duodenum,  pancreas, liver):  § Do not eat or drink for 6 hours before the test.  ¨ If youre having an EUS of the lower GI tract (rectum):  § Before the test, do bowel prep as instructed to clean your rectum of stool. This may involve a clear liquid diet and using a laxative (liquid or pills) the night before the test. Or it may mean doing one or more enemas the morning of the test.  § Do not eat or drink for 6 hours before the test.  · Be sure to arrive on time at the facility. Bring your identification and health insurance card. Leave valuables at home. If you have them, bring X-rays or other test results with you.  Let the healthcare provider know  For your safety, tell the healthcare provider if you:  · Take insulin. Your dose may need to be changed on the day of your test.  · Are allergic to latex.  · Have any other allergies.  · Are taking blood thinners.   During the test  An endoscopic ultrasound usually takes place in a hospital. The procedure itself may take 1 to 2 hours. You will likely go home soon afterward. During the test:  · You lie on your left side on an exam table.  · An intravenous (IV) line will be put into a vein in your arm or hand. This line supplies fluids and medicines. To keep you comfortable during the test, you will be given a sedative medicine. This medicine prevents discomfort and will make you sleepy.  · If you are having an EUS of the upper GI tract, local anesthetic may be sprayed in your throat. This will help you be more comfortable as the healthcare provider inserts the scope. The healthcare provider then gently puts the flexible scope into your mouth or nose and down your throat.  · If youre having an EUS of the lower GI tract, the healthcare provider gently puts the flexible scope into your anus.  · During the test, the scope sends live video and ultrasound images from inside your body to nearby monitors. These are used to examine your GI tract. Specialized procedures, such as drainage,  are done as needed.  · The healthcare provider may discuss the results with you soon after the test. Biopsy results take several  days.  · In most cases, you can go home within a few hours of the test. When you leave the facility, have an adult family member or friend drive you, even if you don't feel that sleepy.  After the test  Here is what to expect after the test:  · You may feel tired from the sedative. This should wear off by the end of the day.  · If you had an upper digestive endoscopy, your throat may feel sore for a day or two. Over-the-counter sore throat lozenges and spray should help.  · You can eat and drink normally as soon as the test is done.  When to call the healthcare provider  Call your healthcare provider if you notice any of the following:  · Fever of 100.4°F (38.0°C) or higher, or as advised by your healthcare provider  · Shortness of breath  · Vomiting blood, blood in stool, or black stools  · Coughing or hoarse voice that wont go away   Date Last Reviewed: 7/1/2016  © 9166-8631 [employer]. [address] [place] [address]. All rights reserved. This information is not intended as a substitute for professional medical care. Always follow your healthcare professional's instructions.

## 2019-10-25 NOTE — ANESTHESIA POSTPROCEDURE EVALUATION
Anesthesia Post Evaluation    Patient: Angelica Tomlinson    Procedure(s) Performed: Procedure(s) (LRB):  ULTRASOUND, UPPER GI TRACT, ENDOSCOPIC (N/A)  EGD (ESOPHAGOGASTRODUODENOSCOPY) (N/A)    Final Anesthesia Type: MAC  Patient location during evaluation: GI PACU  Patient participation: Yes- Able to Participate  Level of consciousness: awake and alert  Post-procedure vital signs: reviewed and stable  Pain management: adequate  Airway patency: patent  PONV status at discharge: No PONV  Anesthetic complications: no      Cardiovascular status: blood pressure returned to baseline and hemodynamically stable  Respiratory status: unassisted and spontaneous ventilation  Hydration status: euvolemic  Follow-up not needed.          Vitals Value Taken Time   /59 10/25/2019  1:10 PM   Temp 36.9 °C (98.4 °F) 10/25/2019 12:05 PM   Pulse 59 10/25/2019  1:10 PM   Resp 16 10/25/2019  1:10 PM   SpO2 99 % 10/25/2019  1:10 PM         Event Time     Out of Recovery 13:11:29          Pain/Mine Score: Pain Rating Prior to Med Admin: 6 (10/25/2019 12:34 PM)  Mine Score: 10 (10/25/2019  1:11 PM)

## 2019-10-25 NOTE — H&P
Short Stay Endoscopy History and Physical    PCP - Miguel Barroso MD  Referring Physician - Anisa Ngo MD  [address]  [address]  [place] [address]    Procedure - EUS  ASA - per anesthesia  Mallampati - per anesthesia  History of Anesthesia problems - no  Family history Anesthesia problems -  no   Plan of anesthesia - General    HPI:  This is a 74 y.o. female here for evaluation of: biliary stricture    Reflux - no  Dysphagia - no  Abdominal pain - no  Diarrhea - no    ROS:  Constitutional: No fevers, chills, POS weight loss  CV: No chest pain  Pulm: No cough, No shortness of breath  GI: see HPI    Medical History:  has a past medical history of Cancer, Constipation, Cyst of breast, left, benign solitary, Diverticulosis, Duodenal mass (5/16/2016), Helicobacter pylori gastritis, Hiatal hernia, and Tobacco abuse (10/9/2019).    Surgical History:  has a past surgical history that includes Appendectomy; colon polyps; ERCP w/ plastic stent placement (10/21/2019); and ERCP (N/A, 10/21/2019).    Family History: family history includes Pneumonia in her mother..    Social History:  reports that she has been smoking cigarettes. She started smoking about 42 years ago. She has a 21.00 pack-year smoking history. She has quit using smokeless tobacco. She reports that she does not drink alcohol or use drugs.    Review of patient's allergies indicates:  No Known Allergies    Medications:   Medications Prior to Admission   Medication Sig Dispense Refill Last Dose    amLODIPine (NORVASC) 10 MG tablet Take 1 tablet (10 mg total) by mouth once daily. 90 tablet 3 Past Week at Unknown time    amoxicillin-clavulanate 875-125mg (AUGMENTIN) 875-125 mg per tablet Take 1 tablet by mouth 2 (two) times daily. for 10 days 20 tablet 0 10/24/2019 at Unknown time    aspirin 81 MG Chew    10/24/2019 at Unknown time    cholestyramine (QUESTRAN) 4 gram packet Mix 1 packet (4 g total) and take by mouth 2 (two) times daily. for 10  days 20 packet 0 10/24/2019 at Unknown time    hydrOXYzine HCl (ATARAX) 25 MG tablet Take 1 tablet (25 mg total) by mouth 3 (three) times daily as needed for Itching. 30 tablet 0 10/24/2019 at Unknown time    lisinopril (PRINIVIL,ZESTRIL) 20 MG tablet Take 1 tablet (20 mg total) by mouth once daily. 90 tablet 3 Past Week at Unknown time    pantoprazole (PROTONIX) 40 MG tablet Take 1 tablet (40 mg total) by mouth once daily. 30 tablet 11 10/24/2019 at Unknown time    polyethylene glycol (MIRALAX) 17 gram PwPk Take by mouth.   10/24/2019 at Unknown time    vitamin D 1000 units Tab Take 185 mg by mouth once daily.   10/24/2019 at Unknown time       Physical Exam:    Vital Signs:   Vitals:    10/25/19 0911   BP: (!) 142/64   Pulse: (!) 59   Resp: 16   Temp: 97.6 °F (36.4 °C)       General Appearance: Well appearing in no acute distress  Eyes:    No scleral icterus  Lungs: CTA anteriorly  Heart:  Regular  Abdomen: non tender    Labs:  Lab Results   Component Value Date    WBC 8.42 10/24/2019    HGB 11.9 (L) 10/24/2019    HCT 35.9 (L) 10/24/2019     (H) 10/24/2019    CHOL 282 (H) 10/09/2019    TRIG 111 10/09/2019    HDL 49 10/09/2019    ALT 83 (H) 10/24/2019    AST 70 (H) 10/24/2019     10/24/2019    K 4.4 10/24/2019     10/24/2019    CREATININE 0.7 10/24/2019    BUN 8 10/24/2019    CO2 28 10/24/2019    TSH 1.670 10/09/2019    INR 1.1 10/18/2019    HGBA1C 4.8 10/09/2019       I have explained the risks and benefits of this endoscopic procedure to the patient including but not limited to bleeding, inflammation, infection, perforation, and death.      Luis Antonio Lu MD

## 2019-10-28 ENCOUNTER — TELEPHONE (OUTPATIENT)
Dept: HEMATOLOGY/ONCOLOGY | Facility: CLINIC | Age: 74
End: 2019-10-28

## 2019-10-28 ENCOUNTER — TELEPHONE (OUTPATIENT)
Dept: GASTROENTEROLOGY | Facility: CLINIC | Age: 74
End: 2019-10-28

## 2019-10-28 VITALS
HEART RATE: 59 BPM | OXYGEN SATURATION: 99 % | BODY MASS INDEX: 18.42 KG/M2 | WEIGHT: 136 LBS | DIASTOLIC BLOOD PRESSURE: 59 MMHG | RESPIRATION RATE: 16 BRPM | HEIGHT: 72 IN | TEMPERATURE: 98 F | SYSTOLIC BLOOD PRESSURE: 150 MMHG

## 2019-10-28 NOTE — TELEPHONE ENCOUNTER
The patient was instruct was  as per Dr Ngo that her labs are available to view on [facility]. Also that her total bili remains elevate but is improved from 13 to 8. Also her liver test are improved.

## 2019-10-28 NOTE — TELEPHONE ENCOUNTER
Post procedure Instructions: Repeat ERCP in 6-8 weeks to exchange stent. If tissue confirmation is not definitive on today's path (and is desired), EUS tissue sampling can be  performed at same session. Would consider endobiliary RF ablation at time of next stent change (if tissue sampling is positive from today's ERCP). My office will arrange for repeat ERCP. Please contact patient to schedule.

## 2019-10-29 ENCOUNTER — TELEPHONE (OUTPATIENT)
Dept: GASTROENTEROLOGY | Facility: CLINIC | Age: 74
End: 2019-10-29

## 2019-10-29 NOTE — TELEPHONE ENCOUNTER
Scheduled Upper EUS for 12/17/2019 with Dr Arnav Rayo  Reviewed procedure/prep with patient ,verbalized understanding. Copy mailed

## 2019-10-29 NOTE — LETTER
[facility] [facility]  [address], [address]  [place] [address]  Phone: [phone]           [address] [place] [address]        Your ERCP is scheduled for 11:30am Tuesday 11/17/2019 with Dr. Arnav Rayo  at [facility], [address] [place] [address].  Check in at the admit desk on the first floor of the hospital.  Please contact the office at [phone] two days before procedure date to reschedule if you needed.    To ensure that your test is accurate and complete you must follow these instructions.  If you have any questions, please call our office at [phone].    Clear Liquids after 7:00 pm on the evening before your procedure  Brush your teeth with small amount of water is allowed.    You may take medication for blood pressure, seizure or heart with a small amount of water at 600 am the morning of the procedure.  Take none of your other medications until after the procedure is complete.    DO NOT TAKE BLOOD THINNERS OR MEDICATION THAT CONTAINS ASPIRIN.  YOU CAN TAKE 81 MG ASPIRIN    BLOOD THINNERS Coumadin, Aggrenox, Plavix, Pradaxz, Reapro, Pletal, Xarelto, Ticagrelor, Brillinta,  If you take these medications please notify the .    The hospital will call two days before the procedure with an arrival time.    YOU MUST HAVE SOMEONE WITH YOU TO DRIVE YOU HOME SINCE YOU WILL RECEIVE IV SEDATION    Thanks for choosing [facility]

## 2019-10-29 NOTE — TELEPHONE ENCOUNTER
----- Message from Mirlande Yin LPN sent at 10/28/2019  4:39 PM CDT -----  Check to see if patient can be scheduled with any other AES   Due week of 12/9 or 1216

## 2019-10-29 NOTE — LETTER
[facility] [place] [facility]  [address], [address]  [place] [address]  Phone: [phone]           [address] [place] [address]                Your Upper EUS is scheduled for 12:00 pm Tuesday 12/17/2019 with Dr Arnav Rayo at [facility] which is located at [address] [facility] [place] [address].  You will check in at the Hospital Admit Desk located on the 1st floor of the hospital. Please contact  the office two days before procedure date to reschedule if needed  [phone]    Upper Endoscopic Ultrasound    Endoscopic ultrasound(EUS) is a procedure used to image the digestive tract, including pancreas, lesions in esophagus and stomach.  It is used to diagnose and stage cancers of the digestive tract.  If necessary, your doctor may need to take samples during the procedure.    A responsible adult (family member or friend) must come with you and transport you home.  You are not allowed to drive, take a taxi or bus or leave the Endoscopy Center alone.  If you do not have a responsible adult with you to take you home, your exam will be cancelled.     If you have questions about the cost of your procedure, you should contact your insurance company as soon as possible.  Please bring a picture ID and your insurance card.  You will sign  treatment authorization forms at check in.  It is necessary for you to sign these forms again even if you recently signed these at the time of your clinic visit.    Please follow instructions of  if you are taking anticoagulant/blood thinning medications such as Aggrenox, aspirin, Brilinta, Effient, Eliquis, Lovenox, Plavix, Pletal, Pradaxa, Ticilid, Xarelto or Coumadin.     Take blood pressure, heart, anti-rejection and or seizure medication at 600 am the morning of the procedure.    Skip your morning dose of insulin or other oral medications for diabetes the morning of the procedure unless instructed otherwise by your doctor.      Day  Before The Procedure    Eat a light evening meal and eat no solid food after 7 pm.  You may drink clear liquids including:    Water, Coffee or decaffeinated coffee (no milk or cream)  Tea, Herbal tea  Carbonated beverages (soft drinks), regular and sugar free  Gelatin  Apple Juice, grape juice, white cranberry juice  Gatorade, Power Aid, Crystal Light, Ramón Aid  Lemonade and Limeade  Bouillon, clear consomme'  Snowball, popsicles  DO NOT DRINK ANY LIQUIDS CONTAINING RED DYE  DO NOT DRINK ANY LIQUIDS NOT SPECIFICALLY LISTED  DO NOT DRINK ALCOHOL  NOTHING BY MOUTH AFTER MIDNIGHT    Day of Procedure    600 am take last dose of any medications you are allowed to take with a small amount of clear liquid

## 2019-10-30 ENCOUNTER — OFFICE VISIT (OUTPATIENT)
Dept: FAMILY MEDICINE | Facility: CLINIC | Age: 74
End: 2019-10-30
Payer: MEDICARE

## 2019-10-30 VITALS
WEIGHT: 141.56 LBS | HEIGHT: 72 IN | OXYGEN SATURATION: 99 % | BODY MASS INDEX: 19.17 KG/M2 | DIASTOLIC BLOOD PRESSURE: 64 MMHG | TEMPERATURE: 98 F | SYSTOLIC BLOOD PRESSURE: 132 MMHG | HEART RATE: 80 BPM

## 2019-10-30 DIAGNOSIS — Z12.11 ENCOUNTER FOR FIT (FECAL IMMUNOCHEMICAL TEST) SCREENING: ICD-10-CM

## 2019-10-30 DIAGNOSIS — Z23 PNEUMOCOCCAL VACCINATION ADMINISTERED AT CURRENT VISIT: ICD-10-CM

## 2019-10-30 DIAGNOSIS — K83.1 BILIARY OBSTRUCTION: Primary | ICD-10-CM

## 2019-10-30 DIAGNOSIS — R17 JAUNDICE: ICD-10-CM

## 2019-10-30 PROCEDURE — 1101F PT FALLS ASSESS-DOCD LE1/YR: CPT | Mod: CPTII,S$GLB,, | Performed by: FAMILY MEDICINE

## 2019-10-30 PROCEDURE — 99214 OFFICE O/P EST MOD 30 MIN: CPT | Mod: 25,S$GLB,, | Performed by: FAMILY MEDICINE

## 2019-10-30 PROCEDURE — 1101F PR PT FALLS ASSESS DOC 0-1 FALLS W/OUT INJ PAST YR: ICD-10-PCS | Mod: CPTII,S$GLB,, | Performed by: FAMILY MEDICINE

## 2019-10-30 PROCEDURE — 90670 PCV13 VACCINE IM: CPT | Mod: S$GLB,,, | Performed by: FAMILY MEDICINE

## 2019-10-30 PROCEDURE — 99214 PR OFFICE/OUTPT VISIT, EST, LEVL IV, 30-39 MIN: ICD-10-PCS | Mod: 25,S$GLB,, | Performed by: FAMILY MEDICINE

## 2019-10-30 PROCEDURE — G0009 ADMIN PNEUMOCOCCAL VACCINE: HCPCS | Mod: S$GLB,,, | Performed by: FAMILY MEDICINE

## 2019-10-30 PROCEDURE — 90670 PNEUMOCOCCAL CONJUGATE VACCINE 13-VALENT LESS THAN 5YO & GREATER THAN: ICD-10-PCS | Mod: S$GLB,,, | Performed by: FAMILY MEDICINE

## 2019-10-30 PROCEDURE — 99999 PR PBB SHADOW E&M-EST. PATIENT-LVL III: ICD-10-PCS | Mod: PBBFAC,,, | Performed by: FAMILY MEDICINE

## 2019-10-30 PROCEDURE — 99999 PR PBB SHADOW E&M-EST. PATIENT-LVL III: CPT | Mod: PBBFAC,,, | Performed by: FAMILY MEDICINE

## 2019-10-30 PROCEDURE — G0009 PNEUMOCOCCAL CONJUGATE VACCINE 13-VALENT LESS THAN 5YO & GREATER THAN: ICD-10-PCS | Mod: S$GLB,,, | Performed by: FAMILY MEDICINE

## 2019-10-30 NOTE — PROGRESS NOTES
Patient received Pneumococcal 13 vaccine and tolerated it well. Advise 15 min wait. Patient received VIS information.

## 2019-10-31 ENCOUNTER — LAB VISIT (OUTPATIENT)
Dept: LAB | Facility: HOSPITAL | Age: 74
End: 2019-10-31
Attending: FAMILY MEDICINE
Payer: MEDICARE

## 2019-10-31 DIAGNOSIS — Z12.11 ENCOUNTER FOR FIT (FECAL IMMUNOCHEMICAL TEST) SCREENING: ICD-10-CM

## 2019-10-31 PROCEDURE — 82274 ASSAY TEST FOR BLOOD FECAL: CPT

## 2019-10-31 NOTE — PROGRESS NOTES
[facility]  Progress Note    SUBJECTIVE:     Chief Complaint   Patient presents with    Jaundice       HPI   Angelica Tomlinson  is a 74 y.o. female here for hospital follow-up for biliary obstruction (h/o gallbladder cancer, treated with chemo). S/p stent placed with improvement in symptoms/labs during this admission. Biopsy pending.  Has repeat ERCP planned in 6-8 weeks and f/u with oncology. She feels better overall, but still very fatigued. Has occasional abdominal pain but none currently. Tolerating PO well.     Review of patient's allergies indicates:  No Known Allergies    Past Medical History:   Diagnosis Date    Cancer     Gallbladder    Constipation     Cyst of breast, left, benign solitary     Diverticulosis     Duodenal mass 5/16/2016    Helicobacter pylori gastritis     Hiatal hernia     Tobacco abuse 10/9/2019     Past Surgical History:   Procedure Laterality Date    APPENDECTOMY      colon polyps      ENDOSCOPIC ULTRASOUND OF UPPER GASTROINTESTINAL TRACT N/A 10/25/2019    Procedure: ULTRASOUND, UPPER GI TRACT, ENDOSCOPIC;  Surgeon: Luis Antonio Lu MD;  Location: [facility];  Service: Endoscopy;  Laterality: N/A;    ERCP N/A 10/21/2019    Procedure: ERCP (ENDOSCOPIC RETROGRADE CHOLANGIOPANCREATOGRAPHY);  Surgeon: Luis Antonio Lu MD;  Location: [facility];  Service: Endoscopy;  Laterality: N/A;    ERCP W/ PLASTIC STENT PLACEMENT  10/21/2019    ESOPHAGOGASTRODUODENOSCOPY N/A 10/25/2019    Procedure: EGD (ESOPHAGOGASTRODUODENOSCOPY);  Surgeon: Luis Antonio Lu MD;  Location: [facility];  Service: Endoscopy;  Laterality: N/A;     Family History   Problem Relation Age of Onset    Pneumonia Mother      Social History     Tobacco Use    Smoking status: Former Smoker     Packs/day: 0.50     Years: 42.00     Pack years: 21.00     Types: Cigarettes     Start date: 1/1/1977    Smokeless tobacco: Former User    Tobacco comment: Pt states that she no longer smokes regularly- only  occasionally.   Substance Use Topics    Alcohol use: No    Drug use: No        Review of Systems   Constitutional: Negative for chills, fever and malaise/fatigue.   HENT: Negative.    Respiratory: Negative.  Negative for cough and shortness of breath.    Cardiovascular: Negative.  Negative for chest pain.   Gastrointestinal: Negative.  Negative for abdominal pain, nausea and vomiting.   Genitourinary: Negative.    Neurological: Negative for weakness and headaches.   All other systems reviewed and are negative.    OBJECTIVE:     Vitals:    10/30/19 1042   BP: 132/64   Pulse: 80   Temp: 98.1 °F (36.7 °C)     Body mass index is 19.2 kg/m².    Physical Exam   Constitutional: She is oriented to person, place, and time and well-developed, well-nourished, and in no distress. No distress.   HENT:   Head: Normocephalic and atraumatic.   Nose: Nose normal.   + scleral jaundice     Eyes: Conjunctivae and EOM are normal.   Cardiovascular: Normal rate, regular rhythm and normal heart sounds. Exam reveals no gallop and no friction rub.   No murmur heard.  Pulmonary/Chest: Effort normal and breath sounds normal. No respiratory distress. She has no wheezes. She has no rales. She exhibits no tenderness.   Abdominal: Soft. Bowel sounds are normal. She exhibits no distension. There is no tenderness. There is no rebound.   Neurological: She is alert and oriented to person, place, and time.   Skin: Skin is warm. She is not diaphoretic.   + jaundice       Old records were reviewed. Labs and/or images were independently reviewed.    ASSESSMENT     1. Biliary obstruction    2. Jaundice    3. Pneumococcal vaccination administered at current visit    4. Encounter for FIT (fecal immunochemical test) screening        PLAN:     Biliary obstruction/Jaundice   -     S/p stent. Has f/u with GI for repeat ERCP. Also f/u with oncology. T-bili, alk phos, and liver enzymes trended down.    -     Will need PCV13 today to boost immune  system.    Pneumococcal vaccination administered at current visit  -     Pneumococcal Conjugate Vaccine (13 Valent) (IM)    Encounter for FIT (fecal immunochemical test) screening  -     Fecal Immunochemical Test (iFOBT); Future; Expected date: 10/30/2019      RTC IMMANUEL Barroso MD  10/30/2019 7:33 PM

## 2019-11-01 ENCOUNTER — TELEPHONE (OUTPATIENT)
Dept: ENDOSCOPY | Facility: HOSPITAL | Age: 74
End: 2019-11-01

## 2019-11-01 ENCOUNTER — TELEPHONE (OUTPATIENT)
Dept: HEMATOLOGY/ONCOLOGY | Facility: CLINIC | Age: 74
End: 2019-11-01

## 2019-11-01 NOTE — TELEPHONE ENCOUNTER
----- Message from Luis Antonio Lu MD sent at 10/31/2019  4:52 PM CDT -----  Shi- Please let pt know that her repeat EUS that I did last week came back positive of cancer. She should follow-up with Dr. Ngo.    Mirlande- It looks like she is scheduled for EUS in Dec with Dr. Arnav Rayo. She does not need an EUS... She needs ERCP at that time for stent changes. Please change the order to ERCP. Thanks.

## 2019-11-01 NOTE — TELEPHONE ENCOUNTER
I called the patient to see if she will be continuing her care with Dr Walton or if she will scheduling a follow up with Dr Ngo. The patient stated that she will continue her care with Dr Walton. I instructed the patient that I will forward her test result to Dr Walton's office. The patient stated that she has a upcoming appointment. Results faxed

## 2019-11-12 LAB — HEMOCCULT STL QL IA: POSITIVE

## 2019-11-18 ENCOUNTER — HOSPITAL ENCOUNTER (OUTPATIENT)
Dept: RADIOLOGY | Facility: HOSPITAL | Age: 74
Discharge: HOME | End: 2019-11-18
Attending: FAMILY MEDICINE
Payer: MEDICARE

## 2019-11-18 ENCOUNTER — HOSPITAL ENCOUNTER (OUTPATIENT)
Dept: RADIOLOGY | Facility: CLINIC | Age: 74
Discharge: HOME | End: 2019-11-18
Attending: FAMILY MEDICINE
Payer: MEDICARE

## 2019-11-18 DIAGNOSIS — Z12.31 ENCOUNTER FOR SCREENING MAMMOGRAM FOR BREAST CANCER: ICD-10-CM

## 2019-11-18 DIAGNOSIS — Z78.0 POST-MENOPAUSAL: ICD-10-CM

## 2019-11-18 PROCEDURE — 77080 DXA BONE DENSITY AXIAL: CPT | Mod: TC,HCNC,PO

## 2019-11-18 PROCEDURE — 77067 MAMMO DIGITAL SCREENING BILAT WITH TOMOSYNTHESIS_CAD: ICD-10-PCS | Mod: 26,HCNC,, | Performed by: RADIOLOGY

## 2019-11-18 PROCEDURE — 77067 SCR MAMMO BI INCL CAD: CPT | Mod: TC,HCNC,PO

## 2019-11-18 PROCEDURE — 77080 DXA BONE DENSITY AXIAL: CPT | Mod: 26,HCNC,, | Performed by: INTERNAL MEDICINE

## 2019-11-18 PROCEDURE — 77063 BREAST TOMOSYNTHESIS BI: CPT | Mod: 26,HCNC,, | Performed by: RADIOLOGY

## 2019-11-18 PROCEDURE — 77067 SCR MAMMO BI INCL CAD: CPT | Mod: 26,HCNC,, | Performed by: RADIOLOGY

## 2019-11-18 PROCEDURE — 77063 MAMMO DIGITAL SCREENING BILAT WITH TOMOSYNTHESIS_CAD: ICD-10-PCS | Mod: 26,HCNC,, | Performed by: RADIOLOGY

## 2019-11-18 PROCEDURE — 77080 DEXA BONE DENSITY SPINE HIP: ICD-10-PCS | Mod: 26,HCNC,, | Performed by: INTERNAL MEDICINE

## 2019-11-26 ENCOUNTER — TELEPHONE (OUTPATIENT)
Dept: RADIOLOGY | Facility: HOSPITAL | Age: 74
End: 2019-11-26

## 2019-12-13 ENCOUNTER — TELEPHONE (OUTPATIENT)
Dept: ENDOSCOPY | Facility: HOSPITAL | Age: 74
End: 2019-12-13

## 2019-12-13 ENCOUNTER — TELEPHONE (OUTPATIENT)
Dept: GASTROENTEROLOGY | Facility: CLINIC | Age: 74
End: 2019-12-13

## 2019-12-13 NOTE — TELEPHONE ENCOUNTER
----- Message from Vanda Ruiz sent at 12/13/2019 11:38 AM CST -----  Contact: self  Type:  Need Arrival time for procedure    Name of Caller:patient need time of arrival for procedure scheduled on 12/17/2019  When is the first available appointment?  Symptoms  Would the patient rather a call back or a response via [facility]? call  Best Call Back Number:[phone]  Additional Information:

## 2019-12-13 NOTE — TELEPHONE ENCOUNTER
Left message instructing patient to call dept @ [phone] between 8am-4pm.    Arrival time to be given @ 5325  (Message sent via [facility] portal)

## 2019-12-16 ENCOUNTER — TELEPHONE (OUTPATIENT)
Dept: ENDOSCOPY | Facility: HOSPITAL | Age: 74
End: 2019-12-16

## 2019-12-16 NOTE — TELEPHONE ENCOUNTER
Left message instructing patient to call dept @ [phone] between 8am-4pm.    Arrival time  given @ 2520  No Portal.

## 2019-12-17 ENCOUNTER — HOSPITAL ENCOUNTER (OUTPATIENT)
Facility: HOSPITAL | Age: 74
Discharge: HOME | End: 2019-12-17
Attending: INTERNAL MEDICINE | Admitting: INTERNAL MEDICINE
Payer: MEDICARE

## 2019-12-17 ENCOUNTER — TELEPHONE (OUTPATIENT)
Dept: GASTROENTEROLOGY | Facility: HOSPITAL | Age: 74
End: 2019-12-17

## 2019-12-17 ENCOUNTER — ANESTHESIA (OUTPATIENT)
Dept: ENDOSCOPY | Facility: HOSPITAL | Age: 74
End: 2019-12-17
Payer: MEDICARE

## 2019-12-17 ENCOUNTER — TELEPHONE (OUTPATIENT)
Dept: ENDOSCOPY | Facility: HOSPITAL | Age: 74
End: 2019-12-17

## 2019-12-17 ENCOUNTER — ANESTHESIA EVENT (OUTPATIENT)
Dept: ENDOSCOPY | Facility: HOSPITAL | Age: 74
End: 2019-12-17
Payer: MEDICARE

## 2019-12-17 VITALS
SYSTOLIC BLOOD PRESSURE: 195 MMHG | BODY MASS INDEX: 19.37 KG/M2 | TEMPERATURE: 98 F | OXYGEN SATURATION: 100 % | DIASTOLIC BLOOD PRESSURE: 85 MMHG | WEIGHT: 143 LBS | HEIGHT: 72 IN | HEART RATE: 76 BPM | RESPIRATION RATE: 18 BRPM

## 2019-12-17 DIAGNOSIS — K83.1 BILIARY STRICTURE: ICD-10-CM

## 2019-12-17 DIAGNOSIS — K83.1 BILIARY OBSTRUCTION: Primary | ICD-10-CM

## 2019-12-17 LAB
BASOPHILS # BLD AUTO: 0.01 K/UL (ref 0–0.2)
BASOPHILS NFR BLD: 0.5 % (ref 0–1.9)
DIFFERENTIAL METHOD: ABNORMAL
EOSINOPHIL # BLD AUTO: 0 K/UL (ref 0–0.5)
EOSINOPHIL NFR BLD: 0.9 % (ref 0–8)
ERYTHROCYTE [DISTWIDTH] IN BLOOD BY AUTOMATED COUNT: 14.2 % (ref 11.5–14.5)
HCT VFR BLD AUTO: 38.6 % (ref 37–48.5)
HGB BLD-MCNC: 12.7 G/DL (ref 12–16)
LYMPHOCYTES # BLD AUTO: 1.2 K/UL (ref 1–4.8)
LYMPHOCYTES NFR BLD: 54.9 % (ref 18–48)
MCH RBC QN AUTO: 29.7 PG (ref 27–31)
MCHC RBC AUTO-ENTMCNC: 32.9 G/DL (ref 32–36)
MCV RBC AUTO: 90 FL (ref 82–98)
MONOCYTES # BLD AUTO: 0 K/UL (ref 0.3–1)
MONOCYTES NFR BLD: 0.9 % (ref 4–15)
NEUTROPHILS # BLD AUTO: 0.9 K/UL (ref 1.8–7.7)
NEUTROPHILS NFR BLD: 42.8 % (ref 38–73)
PLATELET # BLD AUTO: 67 K/UL (ref 150–350)
PMV BLD AUTO: 9 FL (ref 9.2–12.9)
RBC # BLD AUTO: 4.27 M/UL (ref 4–5.4)
WBC # BLD AUTO: 2.15 K/UL (ref 3.9–12.7)

## 2019-12-17 PROCEDURE — 37000009 HC ANESTHESIA EA ADD 15 MINS: Mod: HCNC | Performed by: INTERNAL MEDICINE

## 2019-12-17 PROCEDURE — 43235 PR EGD, FLEX, DIAGNOSTIC: ICD-10-PCS | Mod: HCNC,,, | Performed by: INTERNAL MEDICINE

## 2019-12-17 PROCEDURE — 43235 EGD DIAGNOSTIC BRUSH WASH: CPT | Mod: HCNC,,, | Performed by: INTERNAL MEDICINE

## 2019-12-17 PROCEDURE — 37000008 HC ANESTHESIA 1ST 15 MINUTES: Mod: HCNC | Performed by: INTERNAL MEDICINE

## 2019-12-17 PROCEDURE — 36415 COLL VENOUS BLD VENIPUNCTURE: CPT | Mod: HCNC

## 2019-12-17 PROCEDURE — 85025 COMPLETE CBC W/AUTO DIFF WBC: CPT | Mod: HCNC

## 2019-12-17 PROCEDURE — 43235 EGD DIAGNOSTIC BRUSH WASH: CPT | Mod: HCNC | Performed by: INTERNAL MEDICINE

## 2019-12-17 PROCEDURE — 63600175 PHARM REV CODE 636 W HCPCS: Mod: HCNC | Performed by: NURSE ANESTHETIST, CERTIFIED REGISTERED

## 2019-12-17 RX ORDER — SODIUM CHLORIDE 0.9 % (FLUSH) 0.9 %
10 SYRINGE (ML) INJECTION
Status: DISCONTINUED | OUTPATIENT
Start: 2019-12-17 | End: 2019-12-17 | Stop reason: HOSPADM

## 2019-12-17 RX ORDER — LIDOCAINE HCL/PF 100 MG/5ML
SYRINGE (ML) INTRAVENOUS
Status: DISCONTINUED | OUTPATIENT
Start: 2019-12-17 | End: 2019-12-17

## 2019-12-17 RX ORDER — CIPROFLOXACIN 2 MG/ML
INJECTION, SOLUTION INTRAVENOUS
Status: DISCONTINUED | OUTPATIENT
Start: 2019-12-17 | End: 2019-12-17

## 2019-12-17 RX ORDER — PROPOFOL 10 MG/ML
VIAL (ML) INTRAVENOUS
Status: DISCONTINUED | OUTPATIENT
Start: 2019-12-17 | End: 2019-12-17

## 2019-12-17 RX ORDER — PROPOFOL 10 MG/ML
VIAL (ML) INTRAVENOUS CONTINUOUS PRN
Status: DISCONTINUED | OUTPATIENT
Start: 2019-12-17 | End: 2019-12-17

## 2019-12-17 RX ORDER — SODIUM CHLORIDE 9 MG/ML
INJECTION, SOLUTION INTRAVENOUS CONTINUOUS
Status: DISCONTINUED | OUTPATIENT
Start: 2019-12-17 | End: 2019-12-17 | Stop reason: HOSPADM

## 2019-12-17 RX ADMIN — CIPROFLOXACIN 400 MG: 2 INJECTION, SOLUTION INTRAVENOUS at 03:12

## 2019-12-17 RX ADMIN — PROPOFOL 150 MCG/KG/MIN: 10 INJECTION, EMULSION INTRAVENOUS at 03:12

## 2019-12-17 RX ADMIN — PROPOFOL 50 MG: 10 INJECTION, EMULSION INTRAVENOUS at 03:12

## 2019-12-17 RX ADMIN — LIDOCAINE HYDROCHLORIDE 60 MG: 20 INJECTION, SOLUTION INTRAVENOUS at 03:12

## 2019-12-17 RX ADMIN — PROPOFOL 30 MG: 10 INJECTION, EMULSION INTRAVENOUS at 03:12

## 2019-12-17 NOTE — DISCHARGE INSTRUCTIONS
Full Liquid Diet  A full liquid diet is a middle step between a clear liquid diet and eating solid foods. A clear liquid diet allows only liquids you can see through. A full liquid diet allows thicker, liquid foods as listed below. It can be anything that is liquid at room temperature.  The full liquid diet may be used before or after surgery. It is also used before some medical tests. It is easy to digest and leaves little food in the stomach and intestines.  A full liquid diet meets calorie and protein needs for your body with liquids only. It should not be used for more than 5 days. Your healthcare provider or dietitian may also order high-protein, high-calorie liquid supplements. These will give you extra vitamins and minerals. You may include the items below on a full liquid diet.    Adults  Adults should drink a total of 2 to 3 quarts of liquid per day. It may be easier to drink small, frequent servings rather than a few large ones. People with severe kidney or heart disease can drink only limited amounts of fluids. Check with your provider.  · Cereals and soups. Creamy, hot breakfast cereals (wheat or rice), pureed soups (including pureed meats, bland vegetables, and white potatoes), tomato puree.  · Desserts. Gelatin, whipped topping, custard-style yogurt, pudding, custard, plain ice cream, sherbet, sorbet, popsicles, fruit juice bars.  · Drinks. Coffee, tea, cream, milk, milkshakes, fruit and vegetable juices, sodas, mineral water (plain or flavored), liquid gelatin, electrolyte replacement sport drinks.  · Other items. Salt, mild-flavored seasonings, chocolate flavoring, gravy, margarine, sugar, syrup, jelly, honey, hard candy (to suck on).  Children  Follow the healthcare providers instructions. Young children who are eating solid foods may be able to have the items listed above without problems. Be sure to follow these safety measures:  · Don't give hard candies to young children. The candies may  cause choking.  · Children under 1 year old. Do not give honey. It may cause illness.  · Children under 2 years old. Ask your childs provider if you should supplement your childs diet with oral rehydration solutions, which have electrolytes. You can buy these drinks at pharmacies and grocery stores. You dont need a prescription.  · Children over 1 year old. Limit milk to 3 or 4 cups per day. Too much milk can make your child less hungry for other foods.  Date Last Reviewed: 8/1/2016 © 2000-2017 [employer]. [address] [place] [address]. All rights reserved. This information is not intended as a substitute for professional medical care. Always follow your healthcare professional's instructions.

## 2019-12-17 NOTE — ANESTHESIA PREPROCEDURE EVALUATION
12/17/2019    Angelica Tomlinson is a 74 y.o. female here for repeat ERCP with stent placement under MAC/GA. Underwent ERCP on 10/21/19 and 10/25/19  with [facility]. BP elevated on arrival, did not take her BP meds today. No symptoms.     Vitals:    12/17/19 1211   BP: (!) 190/85   Pulse: (!) 55   Resp: 16   Temp: 36.4 °C (97.5 °F)     ETT note: Direct laryngoscopy; Mask Ventilation: Easy;  Ellis #2; Grade I; Complicating Factors: 1 attempt    Past Medical History:   Diagnosis Date    Cancer     Gallbladder    Constipation     Cyst of breast, left, benign solitary     Diverticulosis     Duodenal mass 5/16/2016    Helicobacter pylori gastritis     Hiatal hernia     Tobacco abuse 10/9/2019     Past Surgical History:   Procedure Laterality Date    APPENDECTOMY      colon polyps      ENDOSCOPIC ULTRASOUND OF UPPER GASTROINTESTINAL TRACT N/A 10/25/2019    Procedure: ULTRASOUND, UPPER GI TRACT, ENDOSCOPIC;  Surgeon: Luis Antonio Lu MD;  Location: [facility];  Service: Endoscopy;  Laterality: N/A;    ERCP N/A 10/21/2019    Procedure: ERCP (ENDOSCOPIC RETROGRADE CHOLANGIOPANCREATOGRAPHY);  Surgeon: Luis Antonio Lu MD;  Location: [facility];  Service: Endoscopy;  Laterality: N/A;    ERCP W/ PLASTIC STENT PLACEMENT  10/21/2019    ESOPHAGOGASTRODUODENOSCOPY N/A 10/25/2019    Procedure: EGD (ESOPHAGOGASTRODUODENOSCOPY);  Surgeon: Luis Antonio Lu MD;  Location: [facility];  Service: Endoscopy;  Laterality: N/A;           Pre-op Assessment    I have reviewed the Patient Summary Reports.      I have reviewed the Medications.     Review of Systems  Anesthesia Hx:  No problems with previous Anesthesia  History of prior surgery of interest to airway management or planning:  Denies Personal Hx of Anesthesia complications.   Social:  No Alcohol Use, Smoker    Hematology/Oncology:         -- Anemia: Current/Recent Cancer.    Cardiovascular:   Exercise tolerance: good Hypertension, well controlled    Renal/:  Renal/ Normal     Hepatic/GI:   Hiatal Hernia, GERD Gallbladder cancer stage 3b  Transaminitis       Physical Exam  General:  Well nourished    Airway/Jaw/Neck:  Airway Findings: Mouth Opening: Normal Tongue: Normal  General Airway Assessment: Adult  Mallampati: III  Improves to II with phonation.  TM Distance: Normal, at least 6 cm  Jaw/Neck Findings:  Neck ROM: Normal ROM      Dental:  Dental Findings: Upper Dentures, Lower Dentures, Periodontal disease, Severe    Chest/Lungs:  Chest/Lungs Findings: Clear to auscultation, Normal Respiratory Rate     Heart/Vascular:  Heart Findings: Rate: Normal  Rhythm: Regular Rhythm  Sounds: Normal        Mental Status:  Mental Status Findings:  Cooperative, Alert and Oriented       Lab Results   Component Value Date    WBC 8.42 10/24/2019    HGB 11.9 (L) 10/24/2019    HCT 35.9 (L) 10/24/2019     (H) 10/24/2019    CHOL 282 (H) 10/09/2019    TRIG 111 10/09/2019    HDL 49 10/09/2019    ALT 83 (H) 10/24/2019    AST 70 (H) 10/24/2019     10/24/2019    K 4.4 10/24/2019     10/24/2019    CREATININE 0.7 10/24/2019    BUN 8 10/24/2019    CO2 28 10/24/2019    TSH 1.670 10/09/2019    INR 1.1 10/18/2019    HGBA1C 4.8 10/09/2019         Anesthesia Plan  Type of Anesthesia, risks & benefits discussed:  Anesthesia Type:  MAC, general  Patient's Preference:   Intra-op Monitoring Plan: standard ASA monitors  Intra-op Monitoring Plan Comments:   Post Op Pain Control Plan: per primary service following discharge from PACU  Post Op Pain Control Plan Comments:   Induction:   IV  Beta Blocker:  Patient is not currently on a Beta-Blocker (No further documentation required).       Informed Consent: Patient understands risks and agrees with Anesthesia plan.  Questions answered. Anesthesia consent signed with patient.  ASA Score: 3     Day of Surgery Review of History & Physical:    H&P update  referred to the surgeon.         Ready For Surgery From Anesthesia Perspective.

## 2019-12-17 NOTE — H&P
History & Physical - Short Stay  Gastroenterology      SUBJECTIVE:     Procedure: ERCP    Chief Complaint/Indication for Procedure: biliary stricture    History of Present Illness:  Patient is a 74 y.o. female with malignant biliary stricture coming for ERCP.     PTA Medications   Medication Sig    aspirin 81 MG Chew     pantoprazole (PROTONIX) 40 MG tablet Take 1 tablet (40 mg total) by mouth once daily.    polyethylene glycol (MIRALAX) 17 gram PwPk Take by mouth.    vitamin D 1000 units Tab Take 185 mg by mouth once daily.    amLODIPine (NORVASC) 10 MG tablet Take 1 tablet (10 mg total) by mouth once daily.    cholestyramine (QUESTRAN) 4 gram packet Mix 1 packet (4 g total) and take by mouth 2 (two) times daily. for 10 days    lisinopril (PRINIVIL,ZESTRIL) 20 MG tablet Take 1 tablet (20 mg total) by mouth once daily. (Patient taking differently: Take 20 mg by mouth once daily. No longer taking)       Review of patient's allergies indicates:  No Known Allergies     Past Medical History:   Diagnosis Date    Cancer     Gallbladder    Constipation     Cyst of breast, left, benign solitary     Diverticulosis     Duodenal mass 5/16/2016    Helicobacter pylori gastritis     Hiatal hernia     Tobacco abuse 10/9/2019     Past Surgical History:   Procedure Laterality Date    APPENDECTOMY      colon polyps      ENDOSCOPIC ULTRASOUND OF UPPER GASTROINTESTINAL TRACT N/A 10/25/2019    Procedure: ULTRASOUND, UPPER GI TRACT, ENDOSCOPIC;  Surgeon: Luis Antonio Lu MD;  Location: [facility];  Service: Endoscopy;  Laterality: N/A;    ERCP N/A 10/21/2019    Procedure: ERCP (ENDOSCOPIC RETROGRADE CHOLANGIOPANCREATOGRAPHY);  Surgeon: Luis Antonio Lu MD;  Location: [facility];  Service: Endoscopy;  Laterality: N/A;    ERCP W/ PLASTIC STENT PLACEMENT  10/21/2019    ESOPHAGOGASTRODUODENOSCOPY N/A 10/25/2019    Procedure: EGD (ESOPHAGOGASTRODUODENOSCOPY);  Surgeon: Luis Antonio Lu MD;  Location: [facility];  Service:  Endoscopy;  Laterality: N/A;     Family History   Problem Relation Age of Onset    Pneumonia Mother      Social History     Tobacco Use    Smoking status: Former Smoker     Packs/day: 0.50     Years: 42.00     Pack years: 21.00     Types: Cigarettes     Start date: 1/1/1977    Smokeless tobacco: Former User    Tobacco comment: Pt states that she no longer smokes regularly- only occasionally.   Substance Use Topics    Alcohol use: No    Drug use: No            OBJECTIVE:     Vital Signs (Most Recent)  Temp: 97.5 °F (36.4 °C) (12/17/19 1211)  Pulse: (!) 55 (12/17/19 1211)  Resp: 16 (12/17/19 1211)  BP: (!) 190/85 (12/17/19 1211)  SpO2: 100 % (12/17/19 1211)         ASSESSMENT/PLAN:     Patient is a 74 y.o. female with malignant biliary stricture coming for ERCP.     Plan: ERCP    Anesthesia Plan: Moderate Sedation    ASA Grade: ASA 2 - Patient with mild systemic disease with no functional limitations

## 2019-12-17 NOTE — ANESTHESIA POSTPROCEDURE EVALUATION
Anesthesia Post Evaluation    Patient: Angelica Tomlinson    Procedure(s) Performed: Procedure(s) (LRB):  ERCP (ENDOSCOPIC RETROGRADE CHOLANGIOPANCREATOGRAPHY) (N/A)    Final Anesthesia Type: MAC    Patient location during evaluation: GI PACU  Patient participation: Yes- Able to Participate  Level of consciousness: awake and alert and oriented  Post-procedure vital signs: reviewed and stable  Pain management: adequate  Airway patency: patent    PONV status at discharge: No PONV  Anesthetic complications: no      Cardiovascular status: blood pressure returned to baseline, hemodynamically stable and stable  Respiratory status: unassisted, spontaneous ventilation and room air  Hydration status: euvolemic  Follow-up not needed.          Vitals Value Taken Time   /85 12/17/2019 12:11 PM   Temp 36.4 °C (97.5 °F) 12/17/2019 12:11 PM   Pulse 55 12/17/2019 12:11 PM   Resp 16 12/17/2019 12:11 PM   SpO2 100 % 12/17/2019 12:11 PM         No case tracking events are documented in the log.      Pain/Mine Score: No data recorded

## 2019-12-17 NOTE — [employer] PATIENT INSTRUCTIONS
Discharge Summary/Instructions after an Endoscopic Procedure  Patient Name: Angelica Tomlinson  Patient MRN: [id number]  Patient YOB: 1945 Tuesday, December 17, 2019  Monse Rayo MD  RESTRICTIONS:  During your procedure today, you received medications for sedation.  These   medications may affect your judgment, balance and coordination.  Therefore,   for 24 hours, you have the following restrictions:   - DO NOT drive a car, operate machinery, make legal/financial decisions,   sign important papers or drink alcohol.    ACTIVITY:  Today: no heavy lifting, straining or running due to procedural   sedation/anesthesia.  The following day: return to full activity including work.  DIET:  Eat and drink normally unless instructed otherwise.     TREATMENT FOR COMMON SIDE EFFECTS:  - Mild abdominal pain, nausea, belching, bloating or excessive gas:  rest,   eat lightly and use a heating pad.  - Sore Throat: treat with throat lozenges and/or gargle with warm salt   water.  - Because air was used during the procedure, expelling large amounts of air   from your rectum or belching is normal.  - If a bowel prep was taken, you may not have a bowel movement for 1-3 days.    This is normal.  SYMPTOMS TO WATCH FOR AND REPORT TO YOUR PHYSICIAN:  1. Abdominal pain or bloating, other than gas cramps.  2. Chest pain.  3. Back pain.  4. Signs of infection such as: chills or fever occurring within 24 hours   after the procedure.  5. Rectal bleeding, which would show as bright red, maroon, or black stools.   (A tablespoon of blood from the rectum is not serious, especially if   hemorrhoids are present.)  6. Vomiting.  7. Weakness or dizziness.  GO DIRECTLY TO THE NEAREST EMERGENCY ROOM IF YOU HAVE ANY OF THE FOLLOWING:      Difficulty breathing              Chills and/or fever over 101 F   Persistent vomiting and/or vomiting blood   Severe abdominal pain   Severe chest pain   Black, tarry stools   Bleeding- more than one  tablespoon   Any other symptom or condition that you feel may need urgent attention  Your doctor recommends these additional instructions:  If any biopsies were taken, your doctors clinic will contact you in 1 to 2   weeks with any results.  - Discharge patient to home.   - Resume previous diet.   - Continue present medications.   - Repeat ERCP in next week.   - Will need duodenal stent as well as permanent biliary stents (likely 6 x 8   and 6 x 10 epic stents).   - Patient has a contact number available for emergencies.  The signs and   symptoms of potential delayed complications were discussed with the   patient.  Return to normal activities tomorrow.  Written discharge   instructions were provided to the patient.   For questions, problems or results please call your physician - Monse Rayo MD at Work:  [phone].  EMERGENCY PHONE NUMBER: [phone],  LAB RESULTS: [phone]  IF A COMPLICATION OR EMERGENCY SITUATION ARISES AND YOU ARE UNABLE TO REACH   YOUR PHYSICIAN - GO DIRECTLY TO THE EMERGENCY ROOM.  Monse Rayo MD  12/17/2019 4:05:08 PM  This report has been verified and signed electronically.  [employer]

## 2019-12-17 NOTE — TRANSFER OF CARE
Anesthesia Transfer of Care Note    Patient: Angelica Tomlinson    Procedure(s) Performed: Procedure(s) (LRB):  ERCP (ENDOSCOPIC RETROGRADE CHOLANGIOPANCREATOGRAPHY) (N/A)    Patient location: GI    Anesthesia Type: MAC    Transport from OR: Transported from OR on room air with adequate spontaneous ventilation    Post pain: adequate analgesia    Post assessment: no apparent anesthetic complications and tolerated procedure well    Post vital signs: stable    Level of consciousness: awake, alert and oriented    Nausea/Vomiting: no nausea/vomiting    Complications: none    Transfer of care protocol was followed      Last vitals:   Visit Vitals  BP (!) 190/85 (BP Location: Left arm, Patient Position: Lying)   Pulse (!) 55   Temp 36.4 °C (97.5 °F) (Skin)   Resp 16   Ht 6' (1.829 m)   Wt 64.9 kg (143 lb)   SpO2 100%   BMI 19.39 kg/m²

## 2019-12-18 ENCOUNTER — TELEPHONE (OUTPATIENT)
Dept: ENDOSCOPY | Facility: HOSPITAL | Age: 74
End: 2019-12-18

## 2019-12-18 DIAGNOSIS — K83.1 BILIARY OBSTRUCTION: Primary | ICD-10-CM

## 2019-12-18 NOTE — TELEPHONE ENCOUNTER
Please sign order for ERCP. Don't forget to talk with january regarding stents. Let me know when ok to schedule

## 2019-12-20 ENCOUNTER — TELEPHONE (OUTPATIENT)
Dept: ENDOSCOPY | Facility: HOSPITAL | Age: 74
End: 2019-12-20

## 2019-12-24 ENCOUNTER — ANESTHESIA EVENT (OUTPATIENT)
Dept: ENDOSCOPY | Facility: HOSPITAL | Age: 74
DRG: 444 | End: 2019-12-24
Payer: MEDICARE

## 2019-12-24 ENCOUNTER — ANESTHESIA (OUTPATIENT)
Dept: ENDOSCOPY | Facility: HOSPITAL | Age: 74
DRG: 444 | End: 2019-12-24
Payer: MEDICARE

## 2019-12-24 ENCOUNTER — HOSPITAL ENCOUNTER (INPATIENT)
Facility: HOSPITAL | Age: 74
LOS: 3 days | Discharge: HOME | DRG: 444 | End: 2019-12-27
Attending: INTERNAL MEDICINE | Admitting: HOSPITALIST
Payer: MEDICARE

## 2019-12-24 DIAGNOSIS — K83.1 BILIARY OBSTRUCTION: ICD-10-CM

## 2019-12-24 DIAGNOSIS — K83.09 CHOLANGITIS: Primary | ICD-10-CM

## 2019-12-24 DIAGNOSIS — K83.1 BILIARY STRICTURE: ICD-10-CM

## 2019-12-24 PROBLEM — C23 GALLBLADDER CANCER: Status: ACTIVE | Noted: 2019-10-09

## 2019-12-24 PROBLEM — K31.5 DUODENAL STENOSIS: Status: ACTIVE | Noted: 2019-12-24

## 2019-12-24 LAB
ALBUMIN SERPL BCP-MCNC: 2.6 G/DL (ref 3.5–5.2)
ALP SERPL-CCNC: 147 U/L (ref 55–135)
ALT SERPL W/O P-5'-P-CCNC: 17 U/L (ref 10–44)
ANION GAP SERPL CALC-SCNC: 9 MMOL/L (ref 8–16)
ANISOCYTOSIS BLD QL SMEAR: SLIGHT
AST SERPL-CCNC: 26 U/L (ref 10–40)
BASOPHILS # BLD AUTO: 0.01 K/UL (ref 0–0.2)
BASOPHILS NFR BLD: 0.4 % (ref 0–1.9)
BILIRUB SERPL-MCNC: 0.9 MG/DL (ref 0.1–1)
BUN SERPL-MCNC: 7 MG/DL (ref 8–23)
CALCIUM SERPL-MCNC: 8.5 MG/DL (ref 8.7–10.5)
CHLORIDE SERPL-SCNC: 101 MMOL/L (ref 95–110)
CO2 SERPL-SCNC: 28 MMOL/L (ref 23–29)
CREAT SERPL-MCNC: 0.6 MG/DL (ref 0.5–1.4)
DIFFERENTIAL METHOD: ABNORMAL
EOSINOPHIL # BLD AUTO: 0 K/UL (ref 0–0.5)
EOSINOPHIL NFR BLD: 0.4 % (ref 0–8)
ERYTHROCYTE [DISTWIDTH] IN BLOOD BY AUTOMATED COUNT: 14.7 % (ref 11.5–14.5)
EST. GFR  (AFRICAN AMERICAN): >60 ML/MIN/1.73 M^2
EST. GFR  (NON AFRICAN AMERICAN): >60 ML/MIN/1.73 M^2
GLUCOSE SERPL-MCNC: 115 MG/DL (ref 70–110)
HCT VFR BLD AUTO: 38.7 % (ref 37–48.5)
HGB BLD-MCNC: 12.4 G/DL (ref 12–16)
IMM GRANULOCYTES # BLD AUTO: 0.01 K/UL (ref 0–0.04)
IMM GRANULOCYTES NFR BLD AUTO: 0.4 % (ref 0–0.5)
LYMPHOCYTES # BLD AUTO: 1 K/UL (ref 1–4.8)
LYMPHOCYTES NFR BLD: 37.1 % (ref 18–48)
MCH RBC QN AUTO: 29.7 PG (ref 27–31)
MCHC RBC AUTO-ENTMCNC: 32 G/DL (ref 32–36)
MCV RBC AUTO: 93 FL (ref 82–98)
MONOCYTES # BLD AUTO: 0.9 K/UL (ref 0.3–1)
MONOCYTES NFR BLD: 32.8 % (ref 4–15)
NEUTROPHILS # BLD AUTO: 0.8 K/UL (ref 1.8–7.7)
NEUTROPHILS NFR BLD: 28.9 % (ref 38–73)
NRBC BLD-RTO: 0 /100 WBC
OVALOCYTES BLD QL SMEAR: ABNORMAL
PLATELET # BLD AUTO: 179 K/UL (ref 150–350)
PLATELET BLD QL SMEAR: ABNORMAL
PMV BLD AUTO: 9.4 FL (ref 9.2–12.9)
POIKILOCYTOSIS BLD QL SMEAR: SLIGHT
POTASSIUM SERPL-SCNC: 3.4 MMOL/L (ref 3.5–5.1)
PROT SERPL-MCNC: 6.1 G/DL (ref 6–8.4)
RBC # BLD AUTO: 4.17 M/UL (ref 4–5.4)
SODIUM SERPL-SCNC: 138 MMOL/L (ref 136–145)
WBC # BLD AUTO: 2.59 K/UL (ref 3.9–12.7)

## 2019-12-24 PROCEDURE — 74360 PR  X-RAY GUIDE, GI DILATION: ICD-10-PCS | Mod: 26,HCNC,, | Performed by: INTERNAL MEDICINE

## 2019-12-24 PROCEDURE — 25000003 PHARM REV CODE 250: Mod: HCNC | Performed by: NURSE ANESTHETIST, CERTIFIED REGISTERED

## 2019-12-24 PROCEDURE — 43247 PR EGD, FLEX, W/REMOVAL, FOREIGN BODY: ICD-10-PCS | Mod: 51,HCNC,, | Performed by: INTERNAL MEDICINE

## 2019-12-24 PROCEDURE — 63600175 PHARM REV CODE 636 W HCPCS: Mod: HCNC | Performed by: NURSE ANESTHETIST, CERTIFIED REGISTERED

## 2019-12-24 PROCEDURE — 37000008 HC ANESTHESIA 1ST 15 MINUTES: Mod: HCNC | Performed by: INTERNAL MEDICINE

## 2019-12-24 PROCEDURE — D9220A PRA ANESTHESIA: Mod: HCNC,ANES,, | Performed by: ANESTHESIOLOGY

## 2019-12-24 PROCEDURE — C1886 CATHETER, ABLATION: HCPCS | Mod: HCNC | Performed by: INTERNAL MEDICINE

## 2019-12-24 PROCEDURE — D9220A PRA ANESTHESIA: ICD-10-PCS | Mod: HCNC,ANES,, | Performed by: ANESTHESIOLOGY

## 2019-12-24 PROCEDURE — 25000003 PHARM REV CODE 250: Mod: HCNC | Performed by: HOSPITALIST

## 2019-12-24 PROCEDURE — 37000009 HC ANESTHESIA EA ADD 15 MINS: Mod: HCNC | Performed by: INTERNAL MEDICINE

## 2019-12-24 PROCEDURE — S0030 INJECTION, METRONIDAZOLE: HCPCS | Mod: HCNC | Performed by: HOSPITALIST

## 2019-12-24 PROCEDURE — 43266 PR EGD, FLEX, W/PLCMT, STENT: ICD-10-PCS | Mod: HCNC,,, | Performed by: INTERNAL MEDICINE

## 2019-12-24 PROCEDURE — 43266 EGD ENDOSCOPIC STENT PLACE: CPT | Mod: HCNC,,, | Performed by: INTERNAL MEDICINE

## 2019-12-24 PROCEDURE — 99223 1ST HOSP IP/OBS HIGH 75: CPT | Mod: AI,HCNC,GC, | Performed by: HOSPITALIST

## 2019-12-24 PROCEDURE — C1769 GUIDE WIRE: HCPCS | Mod: HCNC | Performed by: INTERNAL MEDICINE

## 2019-12-24 PROCEDURE — 74360 X-RAY GUIDE GI DILATION: CPT | Mod: 26,HCNC,, | Performed by: INTERNAL MEDICINE

## 2019-12-24 PROCEDURE — 11000001 HC ACUTE MED/SURG PRIVATE ROOM: Mod: HCNC

## 2019-12-24 PROCEDURE — 85025 COMPLETE CBC W/AUTO DIFF WBC: CPT | Mod: HCNC

## 2019-12-24 PROCEDURE — 43247 EGD REMOVE FOREIGN BODY: CPT | Mod: 51,HCNC,, | Performed by: INTERNAL MEDICINE

## 2019-12-24 PROCEDURE — 63600175 PHARM REV CODE 636 W HCPCS: Mod: HCNC | Performed by: INTERNAL MEDICINE

## 2019-12-24 PROCEDURE — D9220A PRA ANESTHESIA: Mod: HCNC,CRNA,, | Performed by: NURSE ANESTHETIST, CERTIFIED REGISTERED

## 2019-12-24 PROCEDURE — D9220A PRA ANESTHESIA: ICD-10-PCS | Mod: HCNC,CRNA,, | Performed by: NURSE ANESTHETIST, CERTIFIED REGISTERED

## 2019-12-24 PROCEDURE — 27201674 HC SPHINCTERTOME: Mod: HCNC | Performed by: INTERNAL MEDICINE

## 2019-12-24 PROCEDURE — 43266 EGD ENDOSCOPIC STENT PLACE: CPT | Mod: HCNC | Performed by: INTERNAL MEDICINE

## 2019-12-24 PROCEDURE — 43275 ERCP REMOVE FORGN BODY DUCT: CPT | Mod: HCNC | Performed by: INTERNAL MEDICINE

## 2019-12-24 PROCEDURE — C1876 STENT, NON-COA/NON-COV W/DEL: HCPCS | Mod: HCNC | Performed by: INTERNAL MEDICINE

## 2019-12-24 PROCEDURE — 27201012 HC FORCEPS, HOT/COLD, DISP: Mod: HCNC | Performed by: INTERNAL MEDICINE

## 2019-12-24 PROCEDURE — 99223 PR INITIAL HOSPITAL CARE,LEVL III: ICD-10-PCS | Mod: AI,HCNC,GC, | Performed by: HOSPITALIST

## 2019-12-24 PROCEDURE — 80053 COMPREHEN METABOLIC PANEL: CPT | Mod: HCNC

## 2019-12-24 PROCEDURE — 63600175 PHARM REV CODE 636 W HCPCS: Mod: HCNC | Performed by: HOSPITALIST

## 2019-12-24 RX ORDER — AMLODIPINE BESYLATE 10 MG/1
10 TABLET ORAL DAILY
Status: DISCONTINUED | OUTPATIENT
Start: 2019-12-24 | End: 2019-12-27 | Stop reason: HOSPADM

## 2019-12-24 RX ORDER — ENOXAPARIN SODIUM 100 MG/ML
40 INJECTION SUBCUTANEOUS EVERY 24 HOURS
Status: COMPLETED | OUTPATIENT
Start: 2019-12-24 | End: 2019-12-25

## 2019-12-24 RX ORDER — PANTOPRAZOLE SODIUM 40 MG/1
40 TABLET, DELAYED RELEASE ORAL DAILY
Status: DISCONTINUED | OUTPATIENT
Start: 2019-12-24 | End: 2019-12-27 | Stop reason: HOSPADM

## 2019-12-24 RX ORDER — SODIUM CHLORIDE 9 MG/ML
INJECTION, SOLUTION INTRAVENOUS CONTINUOUS
Status: DISCONTINUED | OUTPATIENT
Start: 2019-12-24 | End: 2019-12-27 | Stop reason: HOSPADM

## 2019-12-24 RX ORDER — AMOXICILLIN AND CLAVULANATE POTASSIUM 875; 125 MG/1; MG/1
1 TABLET, FILM COATED ORAL 2 TIMES DAILY
Qty: 14 TABLET | Refills: 0 | Status: SHIPPED | OUTPATIENT
Start: 2019-12-24 | End: 2019-12-27 | Stop reason: SDUPTHER

## 2019-12-24 RX ORDER — PROPOFOL 10 MG/ML
VIAL (ML) INTRAVENOUS CONTINUOUS PRN
Status: DISCONTINUED | OUTPATIENT
Start: 2019-12-24 | End: 2019-12-24

## 2019-12-24 RX ORDER — CHOLECALCIFEROL (VITAMIN D3) 25 MCG
1000 TABLET ORAL DAILY
Status: DISCONTINUED | OUTPATIENT
Start: 2019-12-25 | End: 2019-12-27 | Stop reason: HOSPADM

## 2019-12-24 RX ORDER — SODIUM CHLORIDE 0.9 % (FLUSH) 0.9 %
10 SYRINGE (ML) INJECTION
Status: DISCONTINUED | OUTPATIENT
Start: 2019-12-24 | End: 2019-12-27 | Stop reason: HOSPADM

## 2019-12-24 RX ORDER — NAPROXEN SODIUM 220 MG/1
81 TABLET, FILM COATED ORAL ONCE
Status: COMPLETED | OUTPATIENT
Start: 2019-12-24 | End: 2019-12-24

## 2019-12-24 RX ORDER — GLYCOPYRROLATE 0.2 MG/ML
INJECTION INTRAMUSCULAR; INTRAVENOUS
Status: DISCONTINUED | OUTPATIENT
Start: 2019-12-24 | End: 2019-12-24

## 2019-12-24 RX ORDER — ONDANSETRON 8 MG/1
8 TABLET, ORALLY DISINTEGRATING ORAL EVERY 8 HOURS PRN
Status: DISCONTINUED | OUTPATIENT
Start: 2019-12-24 | End: 2019-12-27 | Stop reason: HOSPADM

## 2019-12-24 RX ORDER — MORPHINE SULFATE 2 MG/ML
2 INJECTION, SOLUTION INTRAMUSCULAR; INTRAVENOUS EVERY 4 HOURS PRN
Status: DISCONTINUED | OUTPATIENT
Start: 2019-12-24 | End: 2019-12-26

## 2019-12-24 RX ORDER — GLUCAGON 1 MG
1 KIT INJECTION
Status: DISCONTINUED | OUTPATIENT
Start: 2019-12-24 | End: 2019-12-27 | Stop reason: HOSPADM

## 2019-12-24 RX ORDER — SODIUM CHLORIDE 0.9 % (FLUSH) 0.9 %
10 SYRINGE (ML) INJECTION
Status: DISCONTINUED | OUTPATIENT
Start: 2019-12-24 | End: 2019-12-24 | Stop reason: HOSPADM

## 2019-12-24 RX ORDER — NAPROXEN SODIUM 220 MG/1
TABLET, FILM COATED ORAL
Status: COMPLETED
Start: 2019-12-24 | End: 2019-12-24

## 2019-12-24 RX ORDER — PROPOFOL 10 MG/ML
VIAL (ML) INTRAVENOUS
Status: DISCONTINUED | OUTPATIENT
Start: 2019-12-24 | End: 2019-12-24

## 2019-12-24 RX ORDER — METRONIDAZOLE 500 MG/100ML
500 INJECTION, SOLUTION INTRAVENOUS
Status: DISCONTINUED | OUTPATIENT
Start: 2019-12-24 | End: 2019-12-27 | Stop reason: HOSPADM

## 2019-12-24 RX ORDER — POLYETHYLENE GLYCOL 3350 17 G/17G
17 POWDER, FOR SOLUTION ORAL DAILY
Status: DISCONTINUED | OUTPATIENT
Start: 2019-12-24 | End: 2019-12-27 | Stop reason: HOSPADM

## 2019-12-24 RX ORDER — IBUPROFEN 200 MG
16 TABLET ORAL
Status: DISCONTINUED | OUTPATIENT
Start: 2019-12-24 | End: 2019-12-27 | Stop reason: HOSPADM

## 2019-12-24 RX ORDER — CIPROFLOXACIN 2 MG/ML
INJECTION, SOLUTION INTRAVENOUS
Status: DISCONTINUED | OUTPATIENT
Start: 2019-12-24 | End: 2019-12-24

## 2019-12-24 RX ORDER — IBUPROFEN 200 MG
24 TABLET ORAL
Status: DISCONTINUED | OUTPATIENT
Start: 2019-12-24 | End: 2019-12-27 | Stop reason: HOSPADM

## 2019-12-24 RX ORDER — PROMETHAZINE HYDROCHLORIDE 25 MG/1
25 TABLET ORAL EVERY 6 HOURS PRN
Status: DISCONTINUED | OUTPATIENT
Start: 2019-12-24 | End: 2019-12-27 | Stop reason: HOSPADM

## 2019-12-24 RX ORDER — FENTANYL CITRATE 50 UG/ML
INJECTION, SOLUTION INTRAMUSCULAR; INTRAVENOUS
Status: DISCONTINUED | OUTPATIENT
Start: 2019-12-24 | End: 2019-12-24

## 2019-12-24 RX ORDER — CEFEPIME HYDROCHLORIDE 2 G/1
2 INJECTION, POWDER, FOR SOLUTION INTRAVENOUS
Status: DISCONTINUED | OUTPATIENT
Start: 2019-12-24 | End: 2019-12-25

## 2019-12-24 RX ADMIN — METRONIDAZOLE 500 MG: 500 INJECTION, SOLUTION INTRAVENOUS at 01:12

## 2019-12-24 RX ADMIN — AMLODIPINE BESYLATE 10 MG: 10 TABLET ORAL at 01:12

## 2019-12-24 RX ADMIN — METRONIDAZOLE 500 MG: 500 INJECTION, SOLUTION INTRAVENOUS at 08:12

## 2019-12-24 RX ADMIN — PANTOPRAZOLE SODIUM 40 MG: 40 TABLET, DELAYED RELEASE ORAL at 01:12

## 2019-12-24 RX ADMIN — MORPHINE SULFATE 2 MG: 2 INJECTION, SOLUTION INTRAMUSCULAR; INTRAVENOUS at 04:12

## 2019-12-24 RX ADMIN — SODIUM CHLORIDE: 0.9 INJECTION, SOLUTION INTRAVENOUS at 08:12

## 2019-12-24 RX ADMIN — POLYETHYLENE GLYCOL 3350 17 G: 17 POWDER, FOR SOLUTION ORAL at 01:12

## 2019-12-24 RX ADMIN — PROPOFOL 150 MCG/KG/MIN: 10 INJECTION, EMULSION INTRAVENOUS at 08:12

## 2019-12-24 RX ADMIN — ONDANSETRON 8 MG: 8 TABLET, ORALLY DISINTEGRATING ORAL at 04:12

## 2019-12-24 RX ADMIN — SODIUM CHLORIDE: 0.9 INJECTION, SOLUTION INTRAVENOUS at 07:12

## 2019-12-24 RX ADMIN — ENOXAPARIN SODIUM 40 MG: 100 INJECTION SUBCUTANEOUS at 05:12

## 2019-12-24 RX ADMIN — PROPOFOL 30 MG: 10 INJECTION, EMULSION INTRAVENOUS at 08:12

## 2019-12-24 RX ADMIN — FENTANYL CITRATE 25 MCG: 50 INJECTION, SOLUTION INTRAMUSCULAR; INTRAVENOUS at 10:12

## 2019-12-24 RX ADMIN — CIPROFLOXACIN 400 MG: 2 INJECTION, SOLUTION INTRAVENOUS at 08:12

## 2019-12-24 RX ADMIN — ASPIRIN 81 MG CHEWABLE TABLET 81 MG: 81 TABLET CHEWABLE at 01:12

## 2019-12-24 RX ADMIN — SODIUM CHLORIDE: 0.9 INJECTION, SOLUTION INTRAVENOUS at 01:12

## 2019-12-24 RX ADMIN — GLYCOPYRROLATE 0.2 MG: 0.2 INJECTION, SOLUTION INTRAMUSCULAR; INTRAVENOUS at 08:12

## 2019-12-24 RX ADMIN — CEFEPIME 2 G: 2 INJECTION, POWDER, FOR SOLUTION INTRAVENOUS at 03:12

## 2019-12-24 NOTE — TREATMENT PLAN
AES Treatment Plan  12/24/2019  12:40 PM    74 year old female with a history of GB Cancer undergoing palliative chemotherapy (follows at [facility]) who presented for an outpatient ERCP.    ERCP completed with impression and recommendations below.    Impression:            - Normal esophagus.  - Normal stomach.  - Acquired duodenal stenosis.  - Duodenal stent placed.  - One stent was removed from the biliary tree. Minimal purulence seen when removing the stent.  - The other stent could not be safely removed because of severely ulcerated area around the stent. Cannulation failed through the duodenal stent.    Recommendation:        - [facility], case discussed with admitting MD  - CMP and CBC daily  - Clear liquid diet for 2 days.  - Continue antibiotics for mild cholangitis.  - Consult IR as patient will need a PTC, will likely be able to internalize in the future through the duodenal stent.  - We will follow alongside primary team, please call with any additional questions or concerns    Syed Mccarthy M.D.  Gastroenterology Fellow, PGY-VI  Pager: [phone]  [facility]

## 2019-12-24 NOTE — ASSESSMENT & PLAN NOTE
- recurrent gallbladder cancer on palliative chemo with [id number] at [facility]  - will f/u with outpatient Oncologist upon discharge

## 2019-12-24 NOTE — ANESTHESIA POSTPROCEDURE EVALUATION
Anesthesia Post Evaluation    Patient: Angelica Tomlinson    Procedure(s) Performed: Procedure(s) (LRB):  ERCP (ENDOSCOPIC RETROGRADE CHOLANGIOPANCREATOGRAPHY) (N/A)    Final Anesthesia Type: general    Patient location during evaluation: [facility]  Patient participation: Yes- Able to Participate  Level of consciousness: awake and alert  Post-procedure vital signs: reviewed and stable  Pain management: adequate  Airway patency: patent    PONV status at discharge: No PONV  Anesthetic complications: no      Cardiovascular status: blood pressure returned to baseline  Respiratory status: unassisted  Hydration status: euvolemic  Follow-up not needed.          Vitals Value Taken Time   /65 12/24/2019  1:31 PM   Temp 36.3 °C (97.3 °F) 12/24/2019 10:41 AM   Pulse 58 12/24/2019  1:34 PM   Resp 18 12/24/2019  1:00 PM   SpO2 100 % 12/24/2019  1:34 PM   Vitals shown include unvalidated device data.      No case tracking events are documented in the log.      Pain/Mine Score: Mine Score: 9 (12/24/2019 11:00 AM)

## 2019-12-24 NOTE — [employer]ATION PATIENT INSTRUCTIONS
Discharge Summary/Instructions after an Endoscopic Procedure  Patient Name: Angelica Tomlinson  Patient MRN: [id number]  Patient YOB: 1945 Tuesday, December 24, 2019  Monse Rayo MD  RESTRICTIONS:  During your procedure today, you received medications for sedation.  These   medications may affect your judgment, balance and coordination.  Therefore,   for 24 hours, you have the following restrictions:   - DO NOT drive a car, operate machinery, make legal/financial decisions,   sign important papers or drink alcohol.    ACTIVITY:  Today: no heavy lifting, straining or running due to procedural   sedation/anesthesia.  The following day: return to full activity including work.  DIET:  Eat and drink normally unless instructed otherwise.     TREATMENT FOR COMMON SIDE EFFECTS:  - Mild abdominal pain, nausea, belching, bloating or excessive gas:  rest,   eat lightly and use a heating pad.  - Sore Throat: treat with throat lozenges and/or gargle with warm salt   water.  - Because air was used during the procedure, expelling large amounts of air   from your rectum or belching is normal.  - If a bowel prep was taken, you may not have a bowel movement for 1-3 days.    This is normal.  SYMPTOMS TO WATCH FOR AND REPORT TO YOUR PHYSICIAN:  1. Abdominal pain or bloating, other than gas cramps.  2. Chest pain.  3. Back pain.  4. Signs of infection such as: chills or fever occurring within 24 hours   after the procedure.  5. Rectal bleeding, which would show as bright red, maroon, or black stools.   (A tablespoon of blood from the rectum is not serious, especially if   hemorrhoids are present.)  6. Vomiting.  7. Weakness or dizziness.  GO DIRECTLY TO THE NEAREST EMERGENCY ROOM IF YOU HAVE ANY OF THE FOLLOWING:      Difficulty breathing              Chills and/or fever over 101 F   Persistent vomiting and/or vomiting blood   Severe abdominal pain   Severe chest pain   Black, tarry stools   Bleeding- more than one  tablespoon   Any other symptom or condition that you feel may need urgent attention  Your doctor recommends these additional instructions:  If any biopsies were taken, your doctors clinic will contact you in 1 to 2   weeks with any results.  - [facility] the [facility].   - Clear liquid diet for 2 days.   - Continue present medications.   - Will need a PTC, will likely be able to internalize in the future through   the duodenal stent.   - Continue antibiotics for mild cholangitis.  For questions, problems or results please call your physician - Monse Rayo MD at Work:  [phone].  [facility], EMERGENCY ROOM PHONE NUMBER: [phone]  IF A COMPLICATION OR EMERGENCY SITUATION ARISES AND YOU ARE UNABLE TO REACH   YOUR PHYSICIAN - GO DIRECTLY TO THE EMERGENCY ROOM.  Monse Rayo MD  12/24/2019 12:27:52 PM  This report has been verified and signed electronically.  [employer]

## 2019-12-24 NOTE — H&P
History & Physical - Short Stay  Gastroenterology      SUBJECTIVE:     Procedure: ERCP    Chief Complaint/Indication for Procedure: duodenal and biliary obstruction    History of Present Illness:  Patient is a 74 y.o. female with duodenal and biliary obstruction coming for ERCP and duodenal stent placement.     PTA Medications   Medication Sig    amLODIPine (NORVASC) 10 MG tablet Take 1 tablet (10 mg total) by mouth once daily.    aspirin 81 MG Chew     pantoprazole (PROTONIX) 40 MG tablet Take 1 tablet (40 mg total) by mouth once daily.    polyethylene glycol (MIRALAX) 17 gram PwPk Take by mouth.    vitamin D 1000 units Tab Take 185 mg by mouth once daily.    cholestyramine (QUESTRAN) 4 gram packet Mix 1 packet (4 g total) and take by mouth 2 (two) times daily. for 10 days    lisinopril (PRINIVIL,ZESTRIL) 20 MG tablet Take 1 tablet (20 mg total) by mouth once daily. (Patient taking differently: Take 20 mg by mouth once daily. No longer taking)       Review of patient's allergies indicates:  No Known Allergies     Past Medical History:   Diagnosis Date    Cancer     Gallbladder    Constipation     Cyst of breast, left, benign solitary     Diverticulosis     Duodenal mass 5/16/2016    Helicobacter pylori gastritis     Hiatal hernia     Hx of colonic polyp     Tobacco abuse 10/9/2019     Past Surgical History:   Procedure Laterality Date    APPENDECTOMY      colon polyps      COLONOSCOPY      ENDOSCOPIC ULTRASOUND OF UPPER GASTROINTESTINAL TRACT N/A 10/25/2019    Procedure: ULTRASOUND, UPPER GI TRACT, ENDOSCOPIC;  Surgeon: Luis Antonio Lu MD;  Location: [facility] ENDO;  Service: Endoscopy;  Laterality: N/A;    ERCP N/A 10/21/2019    Procedure: ERCP (ENDOSCOPIC RETROGRADE CHOLANGIOPANCREATOGRAPHY);  Surgeon: Luis Antonio Lu MD;  Location: [facility] ENDO;  Service: Endoscopy;  Laterality: N/A;    ERCP N/A 12/17/2019    Procedure: ERCP (ENDOSCOPIC RETROGRADE CHOLANGIOPANCREATOGRAPHY);  Surgeon: Monse José  MD Gimra;  Location: [facility] ENDO;  Service: Endoscopy;  Laterality: N/A;    ERCP W/ PLASTIC STENT PLACEMENT  10/21/2019    ESOPHAGOGASTRODUODENOSCOPY N/A 10/25/2019    Procedure: EGD (ESOPHAGOGASTRODUODENOSCOPY);  Surgeon: Luis Antonio Lu MD;  Location: [facility] ENDO;  Service: Endoscopy;  Laterality: N/A;     Family History   Problem Relation Age of Onset    Pneumonia Mother      Social History     Tobacco Use    Smoking status: Former Smoker     Packs/day: 0.50     Years: 42.00     Pack years: 21.00     Types: Cigarettes     Start date: 1/1/1977    Smokeless tobacco: Never Used    Tobacco comment: Pt states that she no longer smokes regularly- only occasionally.   Substance Use Topics    Alcohol use: No    Drug use: No          OBJECTIVE:     Vital Signs (Most Recent)  Temp: 97.7 °F (36.5 °C) (12/24/19 0730)  Pulse: 79 (12/24/19 0730)  Resp: 18 (12/24/19 0730)  BP: (!) 152/69 (12/24/19 0731)  SpO2: 100 % (12/24/19 0730)         ASSESSMENT/PLAN:     Patient is a 74 y.o. female with duodenal and biliary obstruction coming for ERCP and duodenal stent placement.     Plan: ERCP    Anesthesia Plan: Moderate Sedation    ASA Grade: ASA 2 - Patient with mild systemic disease with no functional limitations

## 2019-12-24 NOTE — H&P
[facility] Medicine  History & Physical    Patient Name: Angelica Tomlinson  MRN: [id number]  Admission Date: 12/24/2019  Attending Physician: John Eubanks MD  Primary Care Provider: Miguel Barroso MD    [facility] Medicine Team: [facility] [place] [facility] A John Eubanks MD     Patient information was obtained from patient, spouse/SO and past medical records.     Subjective:     Principal Problem:Cholangitis    Chief Complaint:   Chief Complaint   Patient presents with    Abdominal Pain        HPI: Ms. 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 During procedure, duodenal stent placed without complication. One biliary stent exchanged and with mild purulence observed upon exchange. Second stent was unable to be exchanged due to severe ulceration around area. Following procedure, she had nausea and abdominal pain, which has since resolved with antiemetic/pain medication. She otherwise denies any fevers, chills, chest pain, shortness of breath, diarrhea, lightheadedness, dizziness.    AES requests admission for IV antibiotics for mild cholangitis and IR consultation for consideration of PTC.    Past Medical History:   Diagnosis Date    Cancer     Gallbladder    Constipation     Cyst of breast, left, benign solitary     Diverticulosis     Duodenal mass 5/16/2016    Helicobacter pylori gastritis     Hiatal hernia     Hx of colonic polyp     Tobacco abuse 10/9/2019       Past Surgical History:   Procedure Laterality Date    APPENDECTOMY      colon polyps      COLONOSCOPY      ENDOSCOPIC ULTRASOUND OF UPPER GASTROINTESTINAL TRACT N/A 10/25/2019    Procedure: ULTRASOUND, UPPER GI TRACT, ENDOSCOPIC;  Surgeon: Luis Antonio Lu MD;  Location: [facility];  Service:  Endoscopy;  Laterality: N/A;    ERCP N/A 10/21/2019    Procedure: ERCP (ENDOSCOPIC RETROGRADE CHOLANGIOPANCREATOGRAPHY);  Surgeon: Luis Antonio Lu MD;  Location: [facility] ENDO;  Service: Endoscopy;  Laterality: N/A;    ERCP N/A 12/17/2019    Procedure: ERCP (ENDOSCOPIC RETROGRADE CHOLANGIOPANCREATOGRAPHY);  Surgeon: Monse Rayo MD;  Location: [facility] ENDO;  Service: Endoscopy;  Laterality: N/A;    ERCP W/ PLASTIC STENT PLACEMENT  10/21/2019    ESOPHAGOGASTRODUODENOSCOPY N/A 10/25/2019    Procedure: EGD (ESOPHAGOGASTRODUODENOSCOPY);  Surgeon: Luis Antonio Lu MD;  Location: [facility];  Service: Endoscopy;  Laterality: N/A;       Review of patient's allergies indicates:  No Known Allergies    Current Facility-Administered Medications on File Prior to Encounter   Medication    0.9%  NaCl infusion    sodium chloride 0.9% flush 10 mL     Current Outpatient Medications on File Prior to Encounter   Medication Sig    amLODIPine (NORVASC) 10 MG tablet Take 1 tablet (10 mg total) by mouth once daily.    aspirin 81 MG Chew     pantoprazole (PROTONIX) 40 MG tablet Take 1 tablet (40 mg total) by mouth once daily.    polyethylene glycol (MIRALAX) 17 gram PwPk Take by mouth.    vitamin D 1000 units Tab Take 185 mg by mouth once daily.    cholestyramine (QUESTRAN) 4 gram packet Mix 1 packet (4 g total) and take by mouth 2 (two) times daily. for 10 days    [DISCONTINUED] lisinopril (PRINIVIL,ZESTRIL) 20 MG tablet Take 1 tablet (20 mg total) by mouth once daily. (Patient taking differently: Take 20 mg by mouth once daily. No longer taking)     Family History     Problem Relation (Age of Onset)    Pneumonia Mother        Tobacco Use    Smoking status: Former Smoker     Packs/day: 0.50     Years: 42.00     Pack years: 21.00     Types: Cigarettes     Start date: 1/1/1977    Smokeless tobacco: Never Used    Tobacco comment: Pt states that she no longer smokes regularly- only occasionally.   Substance and Sexual Activity     Alcohol use: No    Drug use: No    Sexual activity: Not Currently     Partners: Male     Review of Systems   Constitutional: Negative for chills, diaphoresis and fever.   HENT: Negative for congestion and sore throat.    Eyes: Negative for discharge and visual disturbance.   Respiratory: Negative for cough and shortness of breath.    Cardiovascular: Negative for chest pain and leg swelling.   Gastrointestinal: Positive for abdominal pain and nausea. Negative for vomiting.   Genitourinary: Negative for difficulty urinating.   Musculoskeletal: Negative for arthralgias and joint swelling.   Skin: Negative for rash and wound.   Allergic/Immunologic: Positive for immunocompromised state.   Neurological: Negative for light-headedness and headaches.   Psychiatric/Behavioral: Negative for agitation and confusion.     Objective:     Vital Signs (Most Recent):  Temp: 97.4 °F (36.3 °C) (12/24/19 1519)  Pulse: 60 (12/24/19 1519)  Resp: 14 (12/24/19 1519)  BP: 137/64 (12/24/19 1519)  SpO2: 100 % (12/24/19 1519) Vital Signs (24h Range):  Temp:  [97.3 °F (36.3 °C)-97.8 °F (36.6 °C)] 97.4 °F (36.3 °C)  Pulse:  [55-84] 60  Resp:  [14-20] 14  SpO2:  [100 %] 100 %  BP: (131-185)/(64-88) 137/64     Weight: 62.6 kg (138 lb)  Body mass index is 18.72 kg/m².    Physical Exam   Constitutional: She is oriented to person, place, and time. She appears well-developed and well-nourished. No distress.   HENT:   Head: Normocephalic and atraumatic.   Nose: Nose normal.   Eyes: Pupils are equal, round, and reactive to light. EOM are normal. No scleral icterus.   Neck: Normal range of motion. No JVD present. No tracheal deviation present.   Cardiovascular: Normal rate, regular rhythm and normal heart sounds.   Pulmonary/Chest: Effort normal and breath sounds normal. No respiratory distress.   Abdominal: Soft. She exhibits no distension. There is tenderness (mild RUQ).   Musculoskeletal: She exhibits no edema or deformity.   Neurological: She is  alert and oriented to person, place, and time.   Skin: Skin is warm and dry. No rash noted. She is not diaphoretic.   Psychiatric: She has a normal mood and affect. Her behavior is normal.   Vitals reviewed.        CRANIAL NERVES     CN III, IV, VI   Pupils are equal, round, and reactive to light.  Extraocular motions are normal.        Significant Labs: All pertinent labs within the past 24 hours have been reviewed.    Significant Imaging: I have reviewed all pertinent imaging results/findings within the past 24 hours.    Assessment/Plan:     * Cholangitis  - visualized purulence upon removal of stent  - does not appear septic  - initiate on cefepime/flagyl for today, can likely de-escalate to augmentin tomorrow to complete course if remains stable  - daily CBC/CMP      Duodenal stenosis  - s/p stent placement  - clear liquid diet x2 days, then advance to mechanical soft as tolerated      Biliary stricture  - IR consulted for PTC  - anticipate procedure on Thursday; will make NPO@MN tomorrow      Biliary obstruction  - see above      HTN (hypertension)  - mildly elevated, likely 2/2 pain  - continue home amlodipine 10mg    Gallbladder cancer  - recurrent gallbladder cancer on palliative chemo with [id number] at [facility]  - will f/u with outpatient Oncologist upon discharge        VTE Risk Mitigation (From admission, onward)         Ordered     enoxaparin injection 40 mg  Daily      12/24/19 1316     IP VTE HIGH RISK PATIENT  Once      12/24/19 1316                   John Eubanks MD  Department of Hospital Medicine   [facility]

## 2019-12-24 NOTE — CONSULTS
Vascular and Interventional Radiology Consult      Date: 12/24/2019   Primary team[place] [facility] [place] [facility] [place]EENA, John CARTER Cha, *   Room/bed: 628/628 A    Reason for Consult:   Suspected cholangitis.    History of Present Illness:  Angelica Tomlinson is a 74 y.o. female, admitted 12/24/2019 with abdominal pain. Dxd in 2016 s/p CXRT. Developed biliary obstruction and came in as outpt for ERCP today. Duodenal stent was placed. One biliary stent was exchanged without issue. Mild purulence was noted. Second could not be exchanged 2/2 severe surrounding ulceration. Pt was monitored and admitted to medicine for IV antibiotics and IR evaluation.    Pt not currently septic or sick appearing. Has been afebrile. Normal HR. TBili 0.9. WBC 2.6.     Past Medical History:  Past Medical History:   Diagnosis Date    Cancer     Gallbladder    Constipation     Cyst of breast, left, benign solitary     Diverticulosis     Duodenal mass 5/16/2016    Helicobacter pylori gastritis     Hiatal hernia     Hx of colonic polyp     Tobacco abuse 10/9/2019       Past Surgical History:  Past Surgical History:   Procedure Laterality Date    APPENDECTOMY      colon polyps      COLONOSCOPY      ENDOSCOPIC ULTRASOUND OF UPPER GASTROINTESTINAL TRACT N/A 10/25/2019    Procedure: ULTRASOUND, UPPER GI TRACT, ENDOSCOPIC;  Surgeon: Luis Antonio Lu MD;  Location: [facility];  Service: Endoscopy;  Laterality: N/A;    ERCP N/A 10/21/2019    Procedure: ERCP (ENDOSCOPIC RETROGRADE CHOLANGIOPANCREATOGRAPHY);  Surgeon: Luis Antonio Lu MD;  Location: [facility];  Service: Endoscopy;  Laterality: N/A;    ERCP N/A 12/17/2019    Procedure: ERCP (ENDOSCOPIC RETROGRADE CHOLANGIOPANCREATOGRAPHY);  Surgeon: oMnse Rayo MD;  Location: [facility];  Service: Endoscopy;  Laterality: N/A;    ERCP W/ PLASTIC STENT PLACEMENT  10/21/2019    ESOPHAGOGASTRODUODENOSCOPY N/A 10/25/2019    Procedure: EGD (ESOPHAGOGASTRODUODENOSCOPY);  Surgeon: Luis Antonio Lu MD;   Location: [facility];  Service: Endoscopy;  Laterality: N/A;        Sedation History:    No known adverse reactions.     Social History:  Social History     Tobacco Use    Smoking status: Former Smoker     Packs/day: 0.50     Years: 42.00     Pack years: 21.00     Types: Cigarettes     Start date: 1/1/1977    Smokeless tobacco: Never Used    Tobacco comment: Pt states that she no longer smokes regularly- only occasionally.   Substance Use Topics    Alcohol use: No    Drug use: No        Home Medications:   Prior to Admission medications    Medication Sig Start Date End Date Taking? Authorizing Provider   amLODIPine (NORVASC) 10 MG tablet Take 1 tablet (10 mg total) by mouth once daily. 10/9/19  Yes Miguel Barroso MD   aspirin 81 MG Chew  2/26/16  Yes Historical Provider, MD   pantoprazole (PROTONIX) 40 MG tablet Take 1 tablet (40 mg total) by mouth once daily. 10/23/19 10/22/20 Yes [place] MD   polyethylene glycol (MIRALAX) 17 gram PwPk Take by mouth.   Yes Historical Provider, MD   vitamin D 1000 units Tab Take 185 mg by mouth once daily.   Yes Historical Provider, MD   amoxicillin-clavulanate 875-125mg (AUGMENTIN) 875-125 mg per tablet Take 1 tablet by mouth 2 (two) times daily. for 7 days 12/24/19 12/31/19  Monse Rayo MD   cholestyramine (QUESTRAN) 4 gram packet Mix 1 packet (4 g total) and take by mouth 2 (two) times daily. for 10 days 10/22/19 11/1/19  Marin Beach MD   lisinopril (PRINIVIL,ZESTRIL) 20 MG tablet Take 1 tablet (20 mg total) by mouth once daily.  Patient taking differently: Take 20 mg by mouth once daily. No longer taking 10/9/19 12/24/19  Miguel Barroso MD       Inpatient Medications:    Current Facility-Administered Medications:     0.9%  NaCl infusion, , Intravenous, Continuous, Monse Rayo MD, Last Rate: 10 mL/hr at 12/24/19 0745    0.9%  NaCl infusion, , Intravenous, Continuous, Monse Rayo MD, Last Rate: 20 mL/hr at 12/24/19 6738     amLODIPine tablet 10 mg, 10 mg, Oral, Daily, John Eubanks MD, 10 mg at 12/24/19 1333    ceFEPIme injection 2 g, 2 g, Intravenous, Q12H, John Eubanks MD, 2 g at 12/24/19 1554    dextrose 10% (D10W) Bolus, 12.5 g, Intravenous, PRN, John Eubanks MD    dextrose 10% (D10W) Bolus, 25 g, Intravenous, PRN, John Eubanks MD    enoxaparin injection 40 mg, 40 mg, Subcutaneous, Daily, John Eubanks MD    glucagon (human recombinant) injection 1 mg, 1 mg, Intramuscular, PRN, John Eubanks MD    glucose chewable tablet 16 g, 16 g, Oral, PRN, John Eubanks MD    glucose chewable tablet 24 g, 24 g, Oral, PRN, John Eubanks MD    metronidazole IVPB 500 mg, 500 mg, Intravenous, Q8H, John Eubanks MD, Last Rate: 100 mL/hr at 12/24/19 1333, 500 mg at 12/24/19 1333    morphine injection 2 mg, 2 mg, Intravenous, Q4H PRN, John Eubanks MD, 2 mg at 12/24/19 1606    ondansetron disintegrating tablet 8 mg, 8 mg, Oral, Q8H PRN, John Eubanks MD, 8 mg at 12/24/19 1613    pantoprazole EC tablet 40 mg, 40 mg, Oral, Daily, John Eubanks MD, 40 mg at 12/24/19 1333    polyethylene glycol packet 17 g, 17 g, Oral, Daily, John Eubanks MD, 17 g at 12/24/19 1333    promethazine tablet 25 mg, 25 mg, Oral, Q6H PRN, John Eubanks MD    sodium chloride 0.9% flush 10 mL, 10 mL, Intravenous, PRN, John Eubanks MD    [START ON 12/25/2019] vitamin D 1000 units tablet 1,000 Units, 1,000 Units, Oral, Daily, John Eubanks MD    Facility-Administered Medications Ordered in Other Encounters:     0.9%  NaCl infusion, , Intravenous, Continuous, Luis Antonio Lu MD, Stopped at 12/17/19 1537    sodium chloride 0.9% flush 10 mL, 10 mL, Intravenous, PRN, Luis Antonio Lu MD     Anticoagulants/Antiplatelets:   no anticoagulation    Allergies:   Review of patient's allergies indicates:  No Known  Allergies    Review of Systems:   As documented in primary provider H&P.    Vital Signs:  Temp: 97.4 °F (36.3 °C) (12/24/19 1519)  Pulse: 60 (12/24/19 1519)  Resp: 14 (12/24/19 1519)  BP: 137/64 (12/24/19 1519)  SpO2: 100 % (12/24/19 1519)    Temp:  [97.3 °F (36.3 °C)-97.8 °F (36.6 °C)]   Pulse:  [55-84]   Resp:  [14-20]   BP: (131-185)/(64-88)   SpO2:  [100 %]      Physical Exam:  No acute distress, laying comfortably in bed, pleasant and cooperative  Regular rate and rhythm  Breathing unlabored  Abdomen benign  Extremities warm and well perfused    Sedation Exam:  ASA and Mallampati per anesthesia.    Laboratory:  Lab Results   Component Value Date    INR 1.1 10/18/2019       Lab Results   Component Value Date    WBC 2.59 (L) 12/24/2019    HGB 12.4 12/24/2019    HCT 38.7 12/24/2019    MCV 93 12/24/2019     12/24/2019      Lab Results   Component Value Date     (H) 12/24/2019     12/24/2019    K 3.4 (L) 12/24/2019     12/24/2019    CO2 28 12/24/2019    BUN 7 (L) 12/24/2019    CREATININE 0.6 12/24/2019    CALCIUM 8.5 (L) 12/24/2019    MG 1.6 10/22/2019    ALT 17 12/24/2019    AST 26 12/24/2019    ALBUMIN 2.6 (L) 12/24/2019    BILITOT 0.9 12/24/2019    BILIDIR >14.0 (H) 10/18/2019       Imaging:   Reviewed.      ASSESSMENT/PLAN/RECOMMENDATIONS:   74yoF s/p endoscopy with placement of duodenal stent and exchange of 1 biliary stent. Purulence and inflammation noted by team, which prompted concern for cholangitis. Discussed with Dr. Syed Mccarthy, who discussed with Dr. Arnav Rayo. Given labs and non sick appearance, ok to medically manage until Thursday, 12/26. Will plan for PTC that AM. Call immediately if pt decompensates in the interim.     Sedation Plan: per anesthesia  Patient will undergo: PTC and possible intervention      Real Mike MD  R4, Diagnostic and Interventional Radiology  Pager: [phone]

## 2019-12-24 NOTE — ASSESSMENT & PLAN NOTE
- visualized purulence upon removal of stent  - does not appear septic  - initiate on cefepime/flagyl for today, can likely de-escalate to augmentin tomorrow to complete course if remains stable  - daily CBC/CMP

## 2019-12-24 NOTE — TRANSFER OF CARE
Anesthesia Transfer of Care Note    Patient: Angelica Tomlinson    Procedure(s) Performed: Procedure(s) (LRB):  ERCP (ENDOSCOPIC RETROGRADE CHOLANGIOPANCREATOGRAPHY) (N/A)    Patient location: [facility]    Anesthesia Type: general    Transport from OR: Transported from OR on room air with adequate spontaneous ventilation    Post pain: adequate analgesia    Post assessment: no apparent anesthetic complications and tolerated procedure well    Post vital signs: stable    Level of consciousness: responds to stimulation    Nausea/Vomiting: no nausea/vomiting    Complications: none          Last vitals:   Visit Vitals  BP (!) 152/69   Pulse 79   Temp 36.5 °C (97.7 °F) (Temporal)   Resp 18   Ht 6' (1.829 m)   Wt 62.6 kg (138 lb)   SpO2 100%   Breastfeeding? No   BMI 18.72 kg/m²

## 2019-12-24 NOTE — NURSING TRANSFER
Nursing Transfer Note      12/24/2019     Report called to primary nurse.  Pt to be transferred to floor.  VSS.  Spouse aware of transfer.  Will continue to monitor.

## 2019-12-24 NOTE — ANESTHESIA PREPROCEDURE EVALUATION
[facility]  Anesthesia Pre-Operative Evaluation       Patient Name: Angelica Tomlinson  YOB: 1945  MRN: [id number]  [facility]: [id number]      Code Status: Prior   Date of Procedure: 12/24/2019  Procedure: Procedure(s) (LRB):  ERCP (ENDOSCOPIC RETROGRADE CHOLANGIOPANCREATOGRAPHY) (N/A)  Anesthesia: General  Pre-Operative Diagnosis: Final diagnoses:  None  Proceduralist/Surgeon(s) and Role:     * Monse Rayo MD - Primary    SUBJECTIVE:   Angelica Tomlinson is a 74 y.o. female here for repeat ERCP with stent placement under MAC/GA. Underwent ERCP on 10/21/19, 10/25/19, and 12/17  with NAAC. Tolerated GA w natural airway well.    LDA:       Anesthesia Evaluation      Airway   Mallampati: III  TM distance: Normal, at least 6 cm  Neck ROM: Normal ROM  Dental    (+) Upper Dentures, Lower Dentures and Periodontal disease, Severe        Pulmonary    Cardiovascular   Exercise tolerance: good  (+) hypertension well controlled,     Rate: Normal    Neuro/Psych      GI/Hepatic/Renal    (+) hiatal hernia, GERD,     Endo/Other    Abdominal                     ALLERGIES:   Review of patient's allergies indicates:  No Known Allergies  MEDICATIONS:     Current Outpatient Medications on File Prior to Encounter   Medication Sig Dispense Refill Last Dose    amLODIPine (NORVASC) 10 MG tablet Take 1 tablet (10 mg total) by mouth once daily. 90 tablet 3 12/15/2019    aspirin 81 MG Chew    12/16/2019 at Unknown time    cholestyramine (QUESTRAN) 4 gram packet Mix 1 packet (4 g total) and take by mouth 2 (two) times daily. for 10 days 20 packet 0 Taking    lisinopril (PRINIVIL,ZESTRIL) 20 MG tablet Take 1 tablet (20 mg total) by mouth once daily. (Patient taking differently: Take 20 mg by mouth once daily. No longer taking) 90 tablet 3 Unknown at Unknown time    pantoprazole (PROTONIX) 40 MG tablet Take 1 tablet (40 mg total) by mouth once daily. 30 tablet 11 12/16/2019 at Unknown time     polyethylene glycol (MIRALAX) 17 gram PwPk Take by mouth.   12/16/2019 at Unknown time    vitamin D 1000 units Tab Take 185 mg by mouth once daily.   12/16/2019 at Unknown time     No current facility-administered medications for this encounter.      Facility-Administered Medications Ordered in Other Encounters   Medication Dose Route Frequency Provider Last Rate Last Dose    0.9%  NaCl infusion   Intravenous Continuous Luis Antonio Lu MD   Stopped at 12/17/19 1537    sodium chloride 0.9% flush 10 mL  10 mL Intravenous PRN Luis Antonio Lu MD              History:   There are no hospital problems to display for this patient.    Patient Active Problem List   Diagnosis    Hiatal hernia    History of gallbladder cancer    Transaminitis    Jaundice    HTN (hypertension)    Obstructive jaundice    Biliary obstruction    Biliary stricture      Past Medical History:   Diagnosis Date    Cancer     Gallbladder    Constipation     Cyst of breast, left, benign solitary     Diverticulosis     Duodenal mass 5/16/2016    Helicobacter pylori gastritis     Hiatal hernia     Tobacco abuse 10/9/2019     Past Surgical History:   Procedure Laterality Date    APPENDECTOMY      colon polyps      ENDOSCOPIC ULTRASOUND OF UPPER GASTROINTESTINAL TRACT N/A 10/25/2019    Procedure: ULTRASOUND, UPPER GI TRACT, ENDOSCOPIC;  Surgeon: Luis Antonio Lu MD;  Location: [facility];  Service: Endoscopy;  Laterality: N/A;    ERCP N/A 10/21/2019    Procedure: ERCP (ENDOSCOPIC RETROGRADE CHOLANGIOPANCREATOGRAPHY);  Surgeon: Luis Antonio Lu MD;  Location: [facility];  Service: Endoscopy;  Laterality: N/A;    ERCP N/A 12/17/2019    Procedure: ERCP (ENDOSCOPIC RETROGRADE CHOLANGIOPANCREATOGRAPHY);  Surgeon: Monse Rayo MD;  Location: [facility];  Service: Endoscopy;  Laterality: N/A;    ERCP W/ PLASTIC STENT PLACEMENT  10/21/2019    ESOPHAGOGASTRODUODENOSCOPY N/A 10/25/2019    Procedure: EGD (ESOPHAGOGASTRODUODENOSCOPY);  Surgeon:  "Luis Antonio Lu MD;  Location: [facility];  Service: Endoscopy;  Laterality: N/A;     Alcohol use:    Social History     Substance and Sexual Activity   Alcohol Use No          OBJECTIVE:   Last 3 sets of Vitals    Vitals - 1 value per visit 10/28/2019 10/30/2019 12/17/2019   SYSTOLIC - 132 195   DIASTOLIC - 64 85   PULSE - 80 76   TEMPERATURE - 98.1 97.7   RESPIRATIONS - - 18   SPO2 - 99 100   Weight (lb) - 141.54 143   Weight (kg) - 64.2 64.864   HEIGHT - 6' 0" 6' 0"   BODY MASS INDEX - 19.2 19.39   VISIT REPORT - - -   Pain Score  3 4 -       Vital Signs (Most Recent):    Vital Signs Range (Last 24H):  BP: ()/()   Arterial Line BP: ()/()      No intake or output data in the 24 hours ending 12/24/19 0707    There is no height or weight on file to calculate BMI.     Significant Labs:  Lab Results   Component Value Date    WBC 2.15 (L) 12/17/2019    HGB 12.7 12/17/2019    HCT 38.6 12/17/2019    PLT 67 (L) 12/17/2019    CHOL 282 (H) 10/09/2019    TRIG 111 10/09/2019    HDL 49 10/09/2019    ALT 83 (H) 10/24/2019    AST 70 (H) 10/24/2019     10/24/2019    K 4.4 10/24/2019     10/24/2019    CREATININE 0.7 10/24/2019    BUN 8 10/24/2019    CO2 28 10/24/2019    TSH 1.670 10/09/2019    INR 1.1 10/18/2019    HGBA1C 4.8 10/09/2019     No results found for: PREGTESTUR, PREGSERUM, HCG, HCGQUANT   No LMP recorded. Patient is postmenopausal.    EKG:   Results for orders placed or performed during the hospital encounter of 05/03/16   SCHEDULED EKG 12-LEAD (to Muse)    Collection Time: 05/03/16 12:09 PM    Narrative    Test Reason : K31.89  Blood Pressure : ** mmHG  Vent. Rate : 061 BPM     Atrial Rate : 061 BPM     P-R Int : 188 ms          QRS Dur : 110 ms      QT Int : 438 ms       P-R-T Axes : 029 002 072 degrees     QTc Int : 440 ms    Normal sinus rhythm  Normal ECG  No previous ECGs available  Confirmed by VIOLA JENKINS, FERNANDO ([id number]) on 5/3/2016 2:40:27 PM    Referred By: REFUGIO LARSEN           Confirmed By:FERNANDO " VIOLA JENKINS       TTE:  No results found for this or any previous visit.  No results found for: EF  No results found for this or any previous visit.  GIOVANNY:  No results found for this or any previous visit.  Stress Test:  No results found for this or any previous visit.   LHC:  No results found for this or any previous visit.   PFT:  No results found for: FEV1, FVC, YEX7HAS, TLC, DLCO     ASSESSMENT/PLAN:         Pre-op Assessment    I have reviewed the Patient Summary Reports.    I have reviewed the Nursing Notes.   I have reviewed the Medications.     Review of Systems  Anesthesia Hx:  No problems with previous Anesthesia  History of prior surgery of interest to airway management or planning:  Denies Personal Hx of Anesthesia complications.   Social:  No Alcohol Use, Smoker    Hematology/Oncology:         -- Anemia: Current/Recent Cancer.   EENT/Dental:EENT/Dental Normal   Cardiovascular:   Exercise tolerance: good Hypertension, well controlled    Pulmonary:  Pulmonary Normal    Renal/:  Renal/ Normal     Hepatic/GI:   Hiatal Hernia, GERD Gallbladder cancer stage 3b  Transaminitis   Musculoskeletal:  Musculoskeletal Normal    Neurological:  Neurology Normal    Endocrine:  Endocrine Normal    Dermatological:  Skin Normal    Psych:  Psychiatric Normal           Physical Exam  General:  Well nourished    Airway/Jaw/Neck:  Airway Findings: Mouth Opening: Normal Tongue: Normal  General Airway Assessment: Adult  Mallampati: III  Improves to II with phonation.  TM Distance: Normal, at least 6 cm  Jaw/Neck Findings:  Neck ROM: Normal ROM      Dental:  Dental Findings: Upper Dentures, Lower Dentures, Periodontal disease, Severe    Chest/Lungs:  Chest/Lungs Findings: Clear to auscultation, Normal Respiratory Rate     Heart/Vascular:  Heart Findings: Rate: Normal  Rhythm: Regular Rhythm  Sounds: Normal        Mental Status:  Mental Status Findings:  Cooperative, Alert and Oriented         Anesthesia Plan  Type of  Anesthesia, risks & benefits discussed:  Anesthesia Type:  MAC, general  Patient's Preference:   Intra-op Monitoring Plan: standard ASA monitors  Intra-op Monitoring Plan Comments:   Post Op Pain Control Plan: per primary service following discharge from PACU  Post Op Pain Control Plan Comments:   Induction:   IV  Beta Blocker:  Patient is not currently on a Beta-Blocker (No further documentation required).       Informed Consent: Patient understands risks and agrees with Anesthesia plan.  Questions answered. Anesthesia consent signed with patient.  ASA Score: 3     Day of Surgery Review of History & Physical:    H&P update referred to the surgeon.  H&P completed by Anesthesiologist.   Anesthesia Plan Notes: Chart reviewed, patient interviewed and examined.  The anesthetic plan was explained.  Risks, benefits, and alternatives were discussed. Questions were answered and the consent was signed.        AZ Saravia M.D.         Ready For Surgery From Anesthesia Perspective.

## 2019-12-24 NOTE — DISCHARGE INSTRUCTIONS
ERCP (Endoscopic Retrograde Cholangiopancreatography)     A balloon at the tip of a catheter opens above the stone. The stone is pulled out of the duct and leaves your body through stool.     ERCP stands for endoscopic retrograde cholangiopancreatography. This procedure is used to view the biliary and pancreatic ducts.  It is used to evaluate diseases that affect the ducts and to help locate and treat blockages that may be present.  How do I get ready for ERCP?  · Talk to your doctor about any health problems or allergies you have.  · Ask your doctor about the risks of ERCP. These include pancreatitis, infection, bleeding, and tearing the bowel.  · Be sure your doctor knows about all medicines you take. You may be told to stop taking some or all of them before the test. This includes:  · All prescription medicines  · Over-the-counter medicines that don't need a prescription  ·  Any street drugs you may use   · Herbs, vitamins, kelp, seaweed, cough syrups, and other supplements  · You may be asked to take antibiotics ahead of time.  · Avoid blood-thinning medicines for 1 week before ERCP.  · Do not eat or drink for 8 to 12 hours before ERCP.  · Have someone ready to take you home.  What happens during the procedure?  · You may be given medicine through an IV to help you relax.  · Your throat is numbed.  · A thin tube (endoscope) is placed into your throat. It is advanced from the throat through the upper digestive tract, to the common bile duct opening. The endoscope lets the doctor see the common bile and pancreatic ducts on a video screen.  · A cut may be made where the common bile duct opens to the duodenum to make it easier to remove stones.  · As blockages are located and removed, X-rays are taken.  · Contrast dye is injected through a catheter to make the duct show up better on the X-rays.  · An imaging technique that uses X-rays to obtain real-time moving images of internal organs is called fluoroscopy.  Fluoroscopy is used to watch and guide progress of the procedure.   · In some cases, a plastic tube (stent) is placed to hold the ducts open. This stent may be replaced or removed in 6 to 8 weeks. Or it may be left to fall out on its own and be passed in the stool.  What happens after ERCP?  Your doctor may discuss the test results right away or a return visit may be scheduled. You may go home the same day or spend the night in the hospital. Follow these tips:  · You can return to a normal routine the day after the ERCP.  · If a cut was made in the duct, avoid blood-thinning medicines such as aspirin for 5 to 7 days.  · Call your doctor right away if you have a fever or abdominal pain. These may be signs of an infection or torn bowel.   Date Last Reviewed: 6/19/2015  © 5638-1508 [employer] [address] [place] [address], [place] [address]. All rights reserved. This information is not intended as a substitute for professional medical care. Always follow your healthcare professional's instructions.

## 2019-12-24 NOTE — HPI
Ms. 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 During procedure, duodenal stent placed without complication. One biliary stent exchanged and with mild purulence observed upon exchange. Second stent was unable to be exchanged due to severe ulceration around area. Following procedure, she had nausea and abdominal pain, which has since resolved with antiemetic/pain medication. She otherwise denies any fevers, chills, chest pain, shortness of breath, diarrhea, lightheadedness, dizziness.    AES requests admission for IV antibiotics for mild cholangitis and IR consultation for consideration of PTC.

## 2019-12-24 NOTE — ASSESSMENT & PLAN NOTE
- s/p stent placement  - clear liquid diet x2 days, then advance to mechanical soft as tolerated

## 2019-12-24 NOTE — SUBJECTIVE & OBJECTIVE
Past Medical History:   Diagnosis Date    Cancer     Gallbladder    Constipation     Cyst of breast, left, benign solitary     Diverticulosis     Duodenal mass 5/16/2016    Helicobacter pylori gastritis     Hiatal hernia     Hx of colonic polyp     Tobacco abuse 10/9/2019       Past Surgical History:   Procedure Laterality Date    APPENDECTOMY      colon polyps      COLONOSCOPY      ENDOSCOPIC ULTRASOUND OF UPPER GASTROINTESTINAL TRACT N/A 10/25/2019    Procedure: ULTRASOUND, UPPER GI TRACT, ENDOSCOPIC;  Surgeon: Luis Antonio Lu MD;  Location: [facility] END[facility];  Service: Endoscopy;  Laterality: N/A;    ERCP N/A 10/21/2019    Procedure: ERCP (ENDOSCOPIC RETROGRADE CHOLANGIOPANCREATOGRAPHY);  Surgeon: Luis Antonio Lu MD;  Location: [facility] ENDO;  Service: Endoscopy;  Laterality: N/A;    ERCP N/A 12/17/2019    Procedure: ERCP (ENDOSCOPIC RETROGRADE CHOLANGIOPANCREATOGRAPHY);  Surgeon: Monse Rayo MD;  Location: [facility] ENDO;  Service: Endoscopy;  Laterality: N/A;    ERCP W/ PLASTIC STENT PLACEMENT  10/21/2019    ESOPHAGOGASTRODUODENOSCOPY N/A 10/25/2019    Procedure: EGD (ESOPHAGOGASTRODUODENOSCOPY);  Surgeon: Luis Antonio Lu MD;  Location: [facility];  Service: Endoscopy;  Laterality: N/A;       Review of patient's allergies indicates:  No Known Allergies    Current Facility-Administered Medications on File Prior to Encounter   Medication    0.9%  NaCl infusion    sodium chloride 0.9% flush 10 mL     Current Outpatient Medications on File Prior to Encounter   Medication Sig    amLODIPine (NORVASC) 10 MG tablet Take 1 tablet (10 mg total) by mouth once daily.    aspirin 81 MG Chew     pantoprazole (PROTONIX) 40 MG tablet Take 1 tablet (40 mg total) by mouth once daily.    polyethylene glycol (MIRALAX) 17 gram PwPk Take by mouth.    vitamin D 1000 units Tab Take 185 mg by mouth once daily.    cholestyramine (QUESTRAN) 4 gram packet Mix 1 packet (4 g total) and take by mouth 2 (two) times daily. for 10  days    [DISCONTINUED] lisinopril (PRINIVIL,ZESTRIL) 20 MG tablet Take 1 tablet (20 mg total) by mouth once daily. (Patient taking differently: Take 20 mg by mouth once daily. No longer taking)     Family History     Problem Relation (Age of Onset)    Pneumonia Mother        Tobacco Use    Smoking status: Former Smoker     Packs/day: 0.50     Years: 42.00     Pack years: 21.00     Types: Cigarettes     Start date: 1/1/1977    Smokeless tobacco: Never Used    Tobacco comment: Pt states that she no longer smokes regularly- only occasionally.   Substance and Sexual Activity    Alcohol use: No    Drug use: No    Sexual activity: Not Currently     Partners: Male     Review of Systems   Constitutional: Negative for chills, diaphoresis and fever.   HENT: Negative for congestion and sore throat.    Eyes: Negative for discharge and visual disturbance.   Respiratory: Negative for cough and shortness of breath.    Cardiovascular: Negative for chest pain and leg swelling.   Gastrointestinal: Positive for abdominal pain and nausea. Negative for vomiting.   Genitourinary: Negative for difficulty urinating.   Musculoskeletal: Negative for arthralgias and joint swelling.   Skin: Negative for rash and wound.   Allergic/Immunologic: Positive for immunocompromised state.   Neurological: Negative for light-headedness and headaches.   Psychiatric/Behavioral: Negative for agitation and confusion.     Objective:     Vital Signs (Most Recent):  Temp: 97.4 °F (36.3 °C) (12/24/19 1519)  Pulse: 60 (12/24/19 1519)  Resp: 14 (12/24/19 1519)  BP: 137/64 (12/24/19 1519)  SpO2: 100 % (12/24/19 1519) Vital Signs (24h Range):  Temp:  [97.3 °F (36.3 °C)-97.8 °F (36.6 °C)] 97.4 °F (36.3 °C)  Pulse:  [55-84] 60  Resp:  [14-20] 14  SpO2:  [100 %] 100 %  BP: (131-185)/(64-88) 137/64     Weight: 62.6 kg (138 lb)  Body mass index is 18.72 kg/m².    Physical Exam   Constitutional: She is oriented to person, place, and time. She appears well-developed  and well-nourished. No distress.   HENT:   Head: Normocephalic and atraumatic.   Nose: Nose normal.   Eyes: Pupils are equal, round, and reactive to light. EOM are normal. No scleral icterus.   Neck: Normal range of motion. No JVD present. No tracheal deviation present.   Cardiovascular: Normal rate, regular rhythm and normal heart sounds.   Pulmonary/Chest: Effort normal and breath sounds normal. No respiratory distress.   Abdominal: Soft. She exhibits no distension. There is tenderness (mild RUQ).   Musculoskeletal: She exhibits no edema or deformity.   Neurological: She is alert and oriented to person, place, and time.   Skin: Skin is warm and dry. No rash noted. She is not diaphoretic.   Psychiatric: She has a normal mood and affect. Her behavior is normal.   Vitals reviewed.        CRANIAL NERVES     CN III, IV, VI   Pupils are equal, round, and reactive to light.  Extraocular motions are normal.        Significant Labs: All pertinent labs within the past 24 hours have been reviewed.    Significant Imaging: I have reviewed all pertinent imaging results/findings within the past 24 hours.

## 2019-12-24 NOTE — PROGRESS NOTES
Dr. Eubanks paged, returned page immediately. Notified that we need admit order to obtain bed for patient. States he will put one in soon.

## 2019-12-25 PROBLEM — R52 PAIN: Status: ACTIVE | Noted: 2019-12-25

## 2019-12-25 PROBLEM — E87.6 HYPOKALEMIA: Status: ACTIVE | Noted: 2019-12-25

## 2019-12-25 LAB
ALBUMIN SERPL BCP-MCNC: 2.5 G/DL (ref 3.5–5.2)
ALP SERPL-CCNC: 137 U/L (ref 55–135)
ALT SERPL W/O P-5'-P-CCNC: 14 U/L (ref 10–44)
ANION GAP SERPL CALC-SCNC: 10 MMOL/L (ref 8–16)
ANISOCYTOSIS BLD QL SMEAR: SLIGHT
AST SERPL-CCNC: 23 U/L (ref 10–40)
BASOPHILS # BLD AUTO: 0.02 K/UL (ref 0–0.2)
BASOPHILS NFR BLD: 0.6 % (ref 0–1.9)
BILIRUB SERPL-MCNC: 0.9 MG/DL (ref 0.1–1)
BUN SERPL-MCNC: 9 MG/DL (ref 8–23)
CALCIUM SERPL-MCNC: 8.4 MG/DL (ref 8.7–10.5)
CHLORIDE SERPL-SCNC: 101 MMOL/L (ref 95–110)
CO2 SERPL-SCNC: 25 MMOL/L (ref 23–29)
CREAT SERPL-MCNC: 0.6 MG/DL (ref 0.5–1.4)
DIFFERENTIAL METHOD: ABNORMAL
EOSINOPHIL # BLD AUTO: 0 K/UL (ref 0–0.5)
EOSINOPHIL NFR BLD: 0.6 % (ref 0–8)
ERYTHROCYTE [DISTWIDTH] IN BLOOD BY AUTOMATED COUNT: 14.6 % (ref 11.5–14.5)
EST. GFR  (AFRICAN AMERICAN): >60 ML/MIN/1.73 M^2
EST. GFR  (NON AFRICAN AMERICAN): >60 ML/MIN/1.73 M^2
GLUCOSE SERPL-MCNC: 80 MG/DL (ref 70–110)
HCT VFR BLD AUTO: 36.2 % (ref 37–48.5)
HGB BLD-MCNC: 11.6 G/DL (ref 12–16)
IMM GRANULOCYTES # BLD AUTO: 0.01 K/UL (ref 0–0.04)
IMM GRANULOCYTES NFR BLD AUTO: 0.3 % (ref 0–0.5)
LIPASE SERPL-CCNC: 135 U/L (ref 4–60)
LYMPHOCYTES # BLD AUTO: 1.1 K/UL (ref 1–4.8)
LYMPHOCYTES NFR BLD: 35.9 % (ref 18–48)
MAGNESIUM SERPL-MCNC: 1.7 MG/DL (ref 1.6–2.6)
MCH RBC QN AUTO: 29.7 PG (ref 27–31)
MCHC RBC AUTO-ENTMCNC: 32 G/DL (ref 32–36)
MCV RBC AUTO: 93 FL (ref 82–98)
MONOCYTES # BLD AUTO: 1 K/UL (ref 0.3–1)
MONOCYTES NFR BLD: 30.2 % (ref 4–15)
NEUTROPHILS # BLD AUTO: 1 K/UL (ref 1.8–7.7)
NEUTROPHILS NFR BLD: 32.4 % (ref 38–73)
NRBC BLD-RTO: 0 /100 WBC
OVALOCYTES BLD QL SMEAR: ABNORMAL
PLATELET # BLD AUTO: 208 K/UL (ref 150–350)
PLATELET BLD QL SMEAR: ABNORMAL
PMV BLD AUTO: 9.6 FL (ref 9.2–12.9)
POIKILOCYTOSIS BLD QL SMEAR: SLIGHT
POLYCHROMASIA BLD QL SMEAR: ABNORMAL
POTASSIUM SERPL-SCNC: 3.6 MMOL/L (ref 3.5–5.1)
PROT SERPL-MCNC: 5.7 G/DL (ref 6–8.4)
RBC # BLD AUTO: 3.91 M/UL (ref 4–5.4)
SODIUM SERPL-SCNC: 136 MMOL/L (ref 136–145)
WBC # BLD AUTO: 3.15 K/UL (ref 3.9–12.7)

## 2019-12-25 PROCEDURE — 36415 COLL VENOUS BLD VENIPUNCTURE: CPT | Mod: HCNC

## 2019-12-25 PROCEDURE — 99233 PR SUBSEQUENT HOSPITAL CARE,LEVL III: ICD-10-PCS | Mod: HCNC,,, | Performed by: HOSPITALIST

## 2019-12-25 PROCEDURE — 63600175 PHARM REV CODE 636 W HCPCS: Mod: HCNC | Performed by: HOSPITALIST

## 2019-12-25 PROCEDURE — 85025 COMPLETE CBC W/AUTO DIFF WBC: CPT | Mod: HCNC

## 2019-12-25 PROCEDURE — 11000001 HC ACUTE MED/SURG PRIVATE ROOM: Mod: HCNC

## 2019-12-25 PROCEDURE — 99233 SBSQ HOSP IP/OBS HIGH 50: CPT | Mod: HCNC,,, | Performed by: HOSPITALIST

## 2019-12-25 PROCEDURE — 25000003 PHARM REV CODE 250: Mod: HCNC | Performed by: HOSPITALIST

## 2019-12-25 PROCEDURE — 83690 ASSAY OF LIPASE: CPT | Mod: HCNC

## 2019-12-25 PROCEDURE — 80053 COMPREHEN METABOLIC PANEL: CPT | Mod: HCNC

## 2019-12-25 PROCEDURE — S0030 INJECTION, METRONIDAZOLE: HCPCS | Mod: HCNC | Performed by: HOSPITALIST

## 2019-12-25 PROCEDURE — 83735 ASSAY OF MAGNESIUM: CPT | Mod: HCNC

## 2019-12-25 RX ORDER — OXYCODONE HCL 5 MG/5 ML
5 SOLUTION, ORAL ORAL EVERY 4 HOURS PRN
Status: DISCONTINUED | OUTPATIENT
Start: 2019-12-25 | End: 2019-12-27 | Stop reason: HOSPADM

## 2019-12-25 RX ORDER — POTASSIUM CHLORIDE 20 MEQ/1
40 TABLET, EXTENDED RELEASE ORAL ONCE
Status: DISCONTINUED | OUTPATIENT
Start: 2019-12-25 | End: 2019-12-25

## 2019-12-25 RX ORDER — MAGNESIUM SULFATE HEPTAHYDRATE 40 MG/ML
2 INJECTION, SOLUTION INTRAVENOUS ONCE
Status: COMPLETED | OUTPATIENT
Start: 2019-12-25 | End: 2019-12-25

## 2019-12-25 RX ORDER — POTASSIUM CHLORIDE 20 MEQ/15ML
40 SOLUTION ORAL ONCE
Status: COMPLETED | OUTPATIENT
Start: 2019-12-25 | End: 2019-12-25

## 2019-12-25 RX ORDER — CEFEPIME HYDROCHLORIDE 2 G/1
2 INJECTION, POWDER, FOR SOLUTION INTRAVENOUS
Status: DISCONTINUED | OUTPATIENT
Start: 2019-12-25 | End: 2019-12-27 | Stop reason: HOSPADM

## 2019-12-25 RX ADMIN — POTASSIUM CHLORIDE 40 MEQ: 20 SOLUTION ORAL at 09:12

## 2019-12-25 RX ADMIN — METRONIDAZOLE 500 MG: 500 INJECTION, SOLUTION INTRAVENOUS at 09:12

## 2019-12-25 RX ADMIN — PANTOPRAZOLE SODIUM 40 MG: 40 TABLET, DELAYED RELEASE ORAL at 09:12

## 2019-12-25 RX ADMIN — METRONIDAZOLE 500 MG: 500 INJECTION, SOLUTION INTRAVENOUS at 05:12

## 2019-12-25 RX ADMIN — CEFEPIME 2 G: 2 INJECTION, POWDER, FOR SOLUTION INTRAVENOUS at 06:12

## 2019-12-25 RX ADMIN — OXYCODONE HYDROCHLORIDE 5 MG: 5 SOLUTION ORAL at 09:12

## 2019-12-25 RX ADMIN — AMLODIPINE BESYLATE 10 MG: 10 TABLET ORAL at 09:12

## 2019-12-25 RX ADMIN — OXYCODONE HYDROCHLORIDE 5 MG: 5 SOLUTION ORAL at 02:12

## 2019-12-25 RX ADMIN — MELATONIN 1000 UNITS: at 09:12

## 2019-12-25 RX ADMIN — METRONIDAZOLE 500 MG: 500 INJECTION, SOLUTION INTRAVENOUS at 12:12

## 2019-12-25 RX ADMIN — OXYCODONE HYDROCHLORIDE 5 MG: 5 SOLUTION ORAL at 10:12

## 2019-12-25 RX ADMIN — ENOXAPARIN SODIUM 40 MG: 100 INJECTION SUBCUTANEOUS at 06:12

## 2019-12-25 RX ADMIN — CEFEPIME 2 G: 2 INJECTION, POWDER, FOR SOLUTION INTRAVENOUS at 12:12

## 2019-12-25 RX ADMIN — POLYETHYLENE GLYCOL 3350 17 G: 17 POWDER, FOR SOLUTION ORAL at 09:12

## 2019-12-25 RX ADMIN — MAGNESIUM SULFATE IN WATER 2 G: 40 INJECTION, SOLUTION INTRAVENOUS at 02:12

## 2019-12-25 NOTE — ASSESSMENT & PLAN NOTE
- post-ERCP pain  - lipase pending  - suspect 2/2 duodenal stenting/cholangitis  - oxycodone 5mg prn, will reassess

## 2019-12-25 NOTE — PLAN OF CARE
Patient AO x 4. No complaints of pain or discomfort. No falls or injuries during the shift. Currently resting with all safety precautions in place and family at bedside. Will continue to monitor.

## 2019-12-25 NOTE — PROGRESS NOTES
[facility] Hospital Medicine  Progress Note    Patient Name: Angelica Tomlinson  MRN: [id number]  Patient Class: IP- Inpatient   Admission Date: 12/24/2019  Length of Stay: 1 days  Attending Physician: John Eubanks, *  Primary Care Provider: Miguel Barroso MD    [facility] Medicine [place]: [facility] [place] [facility] [place] John Eubanks MD    Subjective:     Principal Problem:Cholangitis        HPI:  Ms. 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 During procedure, duodenal stent placed without complication. One biliary stent exchanged and with mild purulence observed upon exchange. Second stent was unable to be exchanged due to severe ulceration around area. Following procedure, she had nausea and abdominal pain, which has since resolved with antiemetic/pain medication. She otherwise denies any fevers, chills, chest pain, shortness of breath, diarrhea, lightheadedness, dizziness.    AES requests admission for IV antibiotics for mild cholangitis and IR consultation for consideration of PTC.    Overview/Hospital Course:  Admitted from AES service due to post-ERCP pain and evidence of cholangitis following stent exchange. Started on cefepime/flagyl. IR consulted for PTC.    Interval History: Reports some epigastric pain today which radiates towards LUQ. Denies any fever or chills, but had some nausea last night. Lack of appetite today; states food tastes metallic but was able to eat some Jell-o.    Review of Systems   Constitutional: Positive for appetite change and fatigue. Negative for fever.   Respiratory: Negative for chest tightness and shortness of breath.    Cardiovascular: Negative for chest pain.   Gastrointestinal: Positive for abdominal pain and nausea. Negative for diarrhea and  vomiting.     Objective:     Vital Signs (Most Recent):  Temp: 98.9 °F (37.2 °C) (12/25/19 0739)  Pulse: 82 (12/25/19 0739)  Resp: 14 (12/25/19 0739)  BP: (!) 119/59 (12/25/19 0739)  SpO2: 96 % (12/25/19 0739) Vital Signs (24h Range):  Temp:  [96.5 °F (35.8 °C)-98.9 °F (37.2 °C)] 98.9 °F (37.2 °C)  Pulse:  [55-99] 82  Resp:  [14-20] 14  SpO2:  [95 %-100 %] 96 %  BP: (119-185)/(58-88) 119/59     Weight: 62.6 kg (138 lb)  Body mass index is 18.72 kg/m².    Intake/Output Summary (Last 24 hours) at 12/25/2019 1014  Last data filed at 12/24/2019 1446  Gross per 24 hour   Intake 425 ml   Output --   Net 425 ml      Physical Exam   Constitutional: She is oriented to person, place, and time. She appears well-developed and well-nourished. No distress.   HENT:   Head: Normocephalic and atraumatic.   Nose: Nose normal.   Eyes: Pupils are equal, round, and reactive to light. EOM are normal. No scleral icterus.   Neck: Normal range of motion. No JVD present. No tracheal deviation present.   Cardiovascular: Normal rate, regular rhythm and normal heart sounds.   Pulmonary/Chest: Effort normal and breath sounds normal. No respiratory distress.   Abdominal: Soft. She exhibits no distension. There is tenderness (mild RUQ and epigastric).   Musculoskeletal: She exhibits no edema or deformity.   Neurological: She is alert and oriented to person, place, and time.   Skin: Skin is warm and dry. No rash noted. She is not diaphoretic.   Psychiatric: She has a normal mood and affect. Her behavior is normal.   Vitals reviewed.      MELD-Na score: 19 at 10/20/2019  5:15 AM  MELD score: 18 at 10/20/2019  5:15 AM  Calculated from:  Serum Creatinine: 0.8 mg/dL (Rounded to 1 mg/dL) at 10/20/2019  5:15 AM  Serum Sodium: 135 mmol/L at 10/20/2019  5:15 AM  Total Bilirubin: 17.9 mg/dL at 10/20/2019  5:15 AM  INR(ratio): 1.1 at 10/18/2019  9:36 PM  Age: 74 years    Significant Labs:  CBC:  Recent Labs   Lab 12/24/19  1241 12/25/19  0417   WBC 2.59*  3.15*   HGB 12.4 11.6*   HCT 38.7 36.2*    208     CMP:  Recent Labs   Lab 12/24/19  1241 12/25/19  0417    136   K 3.4* 3.6    101   CO2 28 25   * 80   BUN 7* 9   CREATININE 0.6 0.6   CALCIUM 8.5* 8.4*   PROT 6.1 5.7*   ALBUMIN 2.6* 2.5*   BILITOT 0.9 0.9   ALKPHOS 147* 137*   AST 26 23   ALT 17 14   ANIONGAP 9 10   EGFRNONAA >60.0 >60.0     PTINR:  No results for input(s): INR in the last 48 hours.    Significant Procedures:   Dobutamine Stress Test with Color Flow: No results found for this or any previous visit.    None      Assessment/Plan:      * Cholangitis  - visualized purulence upon removal of stent  - does not appear septic  - initiate on cefepime/flagyl for now, can likely de-escalate to cipro/flagyl tomorrow to complete course following PTC  - daily CBC/CMP      Hypokalemia  - replete prn  - check Mg      Pain  - post-ERCP pain  - lipase pending  - suspect 2/2 duodenal stenting/cholangitis  - oxycodone 5mg prn, will reassess      Duodenal stenosis  - s/p stent placement  - clear liquid diet x2 days, then advance to mechanical soft as tolerated      Biliary stricture  - IR consulted for PTC  - anticipate procedure on Thursday; will make NPO@MN      Biliary obstruction  - see above      HTN (hypertension)  - controlled  - continue home amlodipine 10mg    Gallbladder cancer  - recurrent gallbladder cancer on palliative chemo with mFOLFOX6 at [facility]  - will f/u with outpatient Oncologist upon discharge        VTE Risk Mitigation (From admission, onward)         Ordered     enoxaparin injection 40 mg  Daily      12/24/19 1316     IP VTE HIGH RISK PATIENT  Once      12/24/19 1316                      John Eubanks MD  Department of Hospital Medicine   [facility]

## 2019-12-25 NOTE — ASSESSMENT & PLAN NOTE
- visualized purulence upon removal of stent  - does not appear septic  - initiate on cefepime/flagyl for now, can likely de-escalate to cipro/flagyl tomorrow to complete course following PTC  - daily CBC/CMP

## 2019-12-25 NOTE — ASSESSMENT & PLAN NOTE
- recurrent gallbladder cancer on palliative chemo with mFOLFOX6 at [facility]  - will f/u with outpatient Oncologist upon discharge     MRI     Called pt and conveyed message.  Referral placed    ----- Message from Cesar Sanchez MD sent at 2/20/2019  8:21 AM CST -----  Ruptured disk.  Please refer to neurosurgery

## 2019-12-25 NOTE — SUBJECTIVE & OBJECTIVE
Interval History: Reports some epigastric pain today which radiates towards LUQ. Denies any fever or chills, but had some nausea last night. Lack of appetite today; states food tastes metallic but was able to eat some Jell-o.    Review of Systems   Constitutional: Positive for appetite change and fatigue. Negative for fever.   Respiratory: Negative for chest tightness and shortness of breath.    Cardiovascular: Negative for chest pain.   Gastrointestinal: Positive for abdominal pain and nausea. Negative for diarrhea and vomiting.     Objective:     Vital Signs (Most Recent):  Temp: 98.9 °F (37.2 °C) (12/25/19 0739)  Pulse: 82 (12/25/19 0739)  Resp: 14 (12/25/19 0739)  BP: (!) 119/59 (12/25/19 0739)  SpO2: 96 % (12/25/19 0739) Vital Signs (24h Range):  Temp:  [96.5 °F (35.8 °C)-98.9 °F (37.2 °C)] 98.9 °F (37.2 °C)  Pulse:  [55-99] 82  Resp:  [14-20] 14  SpO2:  [95 %-100 %] 96 %  BP: (119-185)/(58-88) 119/59     Weight: 62.6 kg (138 lb)  Body mass index is 18.72 kg/m².    Intake/Output Summary (Last 24 hours) at 12/25/2019 1014  Last data filed at 12/24/2019 1446  Gross per 24 hour   Intake 425 ml   Output --   Net 425 ml      Physical Exam   Constitutional: She is oriented to person, place, and time. She appears well-developed and well-nourished. No distress.   HENT:   Head: Normocephalic and atraumatic.   Nose: Nose normal.   Eyes: Pupils are equal, round, and reactive to light. EOM are normal. No scleral icterus.   Neck: Normal range of motion. No JVD present. No tracheal deviation present.   Cardiovascular: Normal rate, regular rhythm and normal heart sounds.   Pulmonary/Chest: Effort normal and breath sounds normal. No respiratory distress.   Abdominal: Soft. She exhibits no distension. There is tenderness (mild RUQ and epigastric).   Musculoskeletal: She exhibits no edema or deformity.   Neurological: She is alert and oriented to person, place, and time.   Skin: Skin is warm and dry. No rash noted. She is not  diaphoretic.   Psychiatric: She has a normal mood and affect. Her behavior is normal.   Vitals reviewed.      MELD-Na score: 19 at 10/20/2019  5:15 AM  MELD score: 18 at 10/20/2019  5:15 AM  Calculated from:  Serum Creatinine: 0.8 mg/dL (Rounded to 1 mg/dL) at 10/20/2019  5:15 AM  Serum Sodium: 135 mmol/L at 10/20/2019  5:15 AM  Total Bilirubin: 17.9 mg/dL at 10/20/2019  5:15 AM  INR(ratio): 1.1 at 10/18/2019  9:36 PM  Age: 74 years    Significant Labs:  CBC:  Recent Labs   Lab 12/24/19  1241 12/25/19  0417   WBC 2.59* 3.15*   HGB 12.4 11.6*   HCT 38.7 36.2*    208     CMP:  Recent Labs   Lab 12/24/19  1241 12/25/19  0417    136   K 3.4* 3.6    101   CO2 28 25   * 80   BUN 7* 9   CREATININE 0.6 0.6   CALCIUM 8.5* 8.4*   PROT 6.1 5.7*   ALBUMIN 2.6* 2.5*   BILITOT 0.9 0.9   ALKPHOS 147* 137*   AST 26 23   ALT 17 14   ANIONGAP 9 10   EGFRNONAA >60.0 >60.0     PTINR:  No results for input(s): INR in the last 48 hours.    Significant Procedures:   Dobutamine Stress Test with Color Flow: No results found for this or any previous visit.    None

## 2019-12-25 NOTE — HOSPITAL COURSE
Ms. Tomlinson was admitted from AES service due to post-ERCP pain and evidence of cholangitis following stent exchange. Started on cefepime/flagyl for cholangitis while in house, but was never septic. IR consulted for PTC, which was placed on 12/26. Procedure well tolerated. Will discharge on augmentin to complete 7d course per AES recs. She will need f/u ERCP in 6 weeks. Problem based discussion below.    Vitals:    12/27/19 1149   BP: (!) 153/69   Pulse: 64   Resp: 18   Temp: 97.9 °F (36.6 °C)     Physical Exam   Constitutional: She is oriented to person, place, and time. She appears well-developed and well-nourished. No distress.   HENT:   Head: Normocephalic and atraumatic.   Nose: Nose normal.   Eyes: Pupils are equal, round, and reactive to light. EOM are normal. No scleral icterus.   Neck: Normal range of motion. No JVD present. No tracheal deviation present.   Cardiovascular: Normal rate, regular rhythm and normal heart sounds.   Pulmonary/Chest: Effort normal and breath sounds normal. No respiratory distress.   Abdominal: Soft. She exhibits no distension. There is tenderness (mild RUQ and epigastric).   Musculoskeletal: She exhibits no edema or deformity.   Neurological: She is alert and oriented to person, place, and time.   Skin: Skin is warm and dry. No rash noted. She is not diaphoretic.   Psychiatric: She has a normal mood and affect. Her behavior is normal.   Vitals reviewed.

## 2019-12-25 NOTE — TREATMENT PLAN
AES Treatment Plan  12/25/2019  12:40 PM    74 year old female with a history of GB Cancer undergoing palliative chemotherapy (follows at [facility]) who presented for an outpatient ERCP.    ERCP completed with impression and recommendations below.    Impression:            - Normal esophagus.  - Normal stomach.  - Acquired duodenal stenosis.  - Duodenal stent placed.  - One stent was removed from the biliary tree. Minimal purulence seen when removing the stent.  - The other stent could not be safely removed because of severely ulcerated area around the stent. Cannulation failed through the duodenal stent.    12/25/19:  IR plan for PTC today. LFTS stable, afebrile, vitally stable. Assessed at bedside, she has stable abdominal pain, not worsened since yesterday. She will try to eat clears today and see how she tolerates it. She denies nausea and vomiting at this time.       Recommendation:        - Admit the patient to [facility] for [facility] care, case discussed with admitting MD  - CMP and CBC daily  - Clear liquid diet for 2 days.  - Continue antibiotics for mild cholangitis.      Foster Lizarraga MD  Gastroenterology Fellow PGY IV   [facility]

## 2019-12-25 NOTE — PROGRESS NOTES
Pharmacist Renal Dose Adjustment Note    Angelica Tomlinson is a 74 y.o. female being treated with the medication cefepime    Patient Data:    Vital Signs (Most Recent):  Temp: 98.9 °F (37.2 °C) (12/25/19 0739)  Pulse: 82 (12/25/19 0739)  Resp: 14 (12/25/19 0739)  BP: (!) 119/59 (12/25/19 0739)  SpO2: 96 % (12/25/19 0739)   Vital Signs (72h Range):  Temp:  [96.5 °F (35.8 °C)-98.9 °F (37.2 °C)]   Pulse:  [55-99]   Resp:  [14-20]   BP: (119-185)/(58-88)   SpO2:  [95 %-100 %]      Recent Labs   Lab 12/24/19  1241 12/25/19  0417   CREATININE 0.6 0.6     Serum creatinine: 0.6 mg/dL 12/25/19 0417  Estimated creatinine clearance: 82.5 mL/min    Medication: Cefepime 2 g IV q12h will be changed to cefepime 2 g IV q8h     Pharmacist's Name: Jake Barroso  Pharmacist's Extension: [phone]

## 2019-12-26 ENCOUNTER — TELEPHONE (OUTPATIENT)
Dept: GASTROENTEROLOGY | Facility: HOSPITAL | Age: 74
End: 2019-12-26

## 2019-12-26 ENCOUNTER — ANESTHESIA EVENT (OUTPATIENT)
Dept: ENDOSCOPY | Facility: HOSPITAL | Age: 74
DRG: 444 | End: 2019-12-26
Payer: MEDICARE

## 2019-12-26 ENCOUNTER — ANESTHESIA (OUTPATIENT)
Dept: ENDOSCOPY | Facility: HOSPITAL | Age: 74
DRG: 444 | End: 2019-12-26
Payer: MEDICARE

## 2019-12-26 DIAGNOSIS — K83.1 BILIARY OBSTRUCTION: Primary | ICD-10-CM

## 2019-12-26 LAB
ALBUMIN SERPL BCP-MCNC: 2.1 G/DL (ref 3.5–5.2)
ALP SERPL-CCNC: 98 U/L (ref 55–135)
ALT SERPL W/O P-5'-P-CCNC: 10 U/L (ref 10–44)
ANION GAP SERPL CALC-SCNC: 8 MMOL/L (ref 8–16)
AST SERPL-CCNC: 17 U/L (ref 10–40)
BASOPHILS # BLD AUTO: 0.02 K/UL (ref 0–0.2)
BASOPHILS NFR BLD: 0.5 % (ref 0–1.9)
BILIRUB SERPL-MCNC: 0.7 MG/DL (ref 0.1–1)
BUN SERPL-MCNC: 11 MG/DL (ref 8–23)
CALCIUM SERPL-MCNC: 7.3 MG/DL (ref 8.7–10.5)
CHLORIDE SERPL-SCNC: 108 MMOL/L (ref 95–110)
CO2 SERPL-SCNC: 24 MMOL/L (ref 23–29)
CREAT SERPL-MCNC: 0.6 MG/DL (ref 0.5–1.4)
DIFFERENTIAL METHOD: ABNORMAL
EOSINOPHIL # BLD AUTO: 0.1 K/UL (ref 0–0.5)
EOSINOPHIL NFR BLD: 1.7 % (ref 0–8)
ERYTHROCYTE [DISTWIDTH] IN BLOOD BY AUTOMATED COUNT: 15 % (ref 11.5–14.5)
EST. GFR  (AFRICAN AMERICAN): >60 ML/MIN/1.73 M^2
EST. GFR  (NON AFRICAN AMERICAN): >60 ML/MIN/1.73 M^2
GLUCOSE SERPL-MCNC: 48 MG/DL (ref 70–110)
HCT VFR BLD AUTO: 33.1 % (ref 37–48.5)
HGB BLD-MCNC: 10.4 G/DL (ref 12–16)
IMM GRANULOCYTES # BLD AUTO: 0.04 K/UL (ref 0–0.04)
IMM GRANULOCYTES NFR BLD AUTO: 1 % (ref 0–0.5)
LYMPHOCYTES # BLD AUTO: 1.6 K/UL (ref 1–4.8)
LYMPHOCYTES NFR BLD: 37.9 % (ref 18–48)
MCH RBC QN AUTO: 29.6 PG (ref 27–31)
MCHC RBC AUTO-ENTMCNC: 31.4 G/DL (ref 32–36)
MCV RBC AUTO: 94 FL (ref 82–98)
MONOCYTES # BLD AUTO: 1 K/UL (ref 0.3–1)
MONOCYTES NFR BLD: 24 % (ref 4–15)
NEUTROPHILS # BLD AUTO: 1.5 K/UL (ref 1.8–7.7)
NEUTROPHILS NFR BLD: 34.9 % (ref 38–73)
NRBC BLD-RTO: 0 /100 WBC
PLATELET # BLD AUTO: 213 K/UL (ref 150–350)
PMV BLD AUTO: 9.4 FL (ref 9.2–12.9)
POCT GLUCOSE: 84 MG/DL (ref 70–110)
POTASSIUM SERPL-SCNC: 3 MMOL/L (ref 3.5–5.1)
PROT SERPL-MCNC: 4.7 G/DL (ref 6–8.4)
RBC # BLD AUTO: 3.51 M/UL (ref 4–5.4)
SODIUM SERPL-SCNC: 140 MMOL/L (ref 136–145)
WBC # BLD AUTO: 4.2 K/UL (ref 3.9–12.7)

## 2019-12-26 PROCEDURE — 25000003 PHARM REV CODE 250: Mod: HCNC | Performed by: NURSE ANESTHETIST, CERTIFIED REGISTERED

## 2019-12-26 PROCEDURE — 85025 COMPLETE CBC W/AUTO DIFF WBC: CPT | Mod: HCNC

## 2019-12-26 PROCEDURE — D9220A PRA ANESTHESIA: ICD-10-PCS | Mod: HCNC,ANES,, | Performed by: ANESTHESIOLOGY

## 2019-12-26 PROCEDURE — 71000033 HC RECOVERY, INTIAL HOUR: Mod: HCNC

## 2019-12-26 PROCEDURE — 25500020 PHARM REV CODE 255

## 2019-12-26 PROCEDURE — 37000009 HC ANESTHESIA EA ADD 15 MINS: Mod: HCNC

## 2019-12-26 PROCEDURE — 11000001 HC ACUTE MED/SURG PRIVATE ROOM: Mod: HCNC

## 2019-12-26 PROCEDURE — 25000003 PHARM REV CODE 250: Mod: HCNC | Performed by: HOSPITALIST

## 2019-12-26 PROCEDURE — 63600175 PHARM REV CODE 636 W HCPCS: Mod: HCNC | Performed by: NURSE ANESTHETIST, CERTIFIED REGISTERED

## 2019-12-26 PROCEDURE — D9220A PRA ANESTHESIA: ICD-10-PCS | Mod: HCNC,CRNA,, | Performed by: NURSE ANESTHETIST, CERTIFIED REGISTERED

## 2019-12-26 PROCEDURE — 37000008 HC ANESTHESIA 1ST 15 MINUTES: Mod: HCNC

## 2019-12-26 PROCEDURE — 63600175 PHARM REV CODE 636 W HCPCS: Mod: HCNC | Performed by: HOSPITALIST

## 2019-12-26 PROCEDURE — 99232 PR SUBSEQUENT HOSPITAL CARE,LEVL II: ICD-10-PCS | Mod: HCNC,,, | Performed by: HOSPITALIST

## 2019-12-26 PROCEDURE — 99232 SBSQ HOSP IP/OBS MODERATE 35: CPT | Mod: HCNC,,, | Performed by: HOSPITALIST

## 2019-12-26 PROCEDURE — D9220A PRA ANESTHESIA: Mod: HCNC,CRNA,, | Performed by: NURSE ANESTHETIST, CERTIFIED REGISTERED

## 2019-12-26 PROCEDURE — 71000039 HC RECOVERY, EACH ADD'L HOUR: Mod: HCNC

## 2019-12-26 PROCEDURE — 82962 GLUCOSE BLOOD TEST: CPT | Mod: HCNC

## 2019-12-26 PROCEDURE — D9220A PRA ANESTHESIA: Mod: HCNC,ANES,, | Performed by: ANESTHESIOLOGY

## 2019-12-26 PROCEDURE — S0030 INJECTION, METRONIDAZOLE: HCPCS | Mod: HCNC | Performed by: HOSPITALIST

## 2019-12-26 PROCEDURE — 80053 COMPREHEN METABOLIC PANEL: CPT | Mod: HCNC

## 2019-12-26 PROCEDURE — 63600175 PHARM REV CODE 636 W HCPCS: Mod: HCNC | Performed by: ANESTHESIOLOGY

## 2019-12-26 RX ORDER — HYDROMORPHONE HYDROCHLORIDE 1 MG/ML
1 INJECTION, SOLUTION INTRAMUSCULAR; INTRAVENOUS; SUBCUTANEOUS EVERY 6 HOURS PRN
Status: DISCONTINUED | OUTPATIENT
Start: 2019-12-26 | End: 2019-12-27 | Stop reason: HOSPADM

## 2019-12-26 RX ORDER — NEOSTIGMINE METHYLSULFATE 0.5 MG/ML
INJECTION, SOLUTION INTRAVENOUS
Status: DISCONTINUED | OUTPATIENT
Start: 2019-12-26 | End: 2019-12-26

## 2019-12-26 RX ORDER — FENTANYL CITRATE 50 UG/ML
INJECTION, SOLUTION INTRAMUSCULAR; INTRAVENOUS
Status: DISCONTINUED | OUTPATIENT
Start: 2019-12-26 | End: 2019-12-26

## 2019-12-26 RX ORDER — POTASSIUM CHLORIDE 20 MEQ/15ML
40 SOLUTION ORAL
Status: COMPLETED | OUTPATIENT
Start: 2019-12-26 | End: 2019-12-26

## 2019-12-26 RX ORDER — PHENYLEPHRINE HYDROCHLORIDE 10 MG/ML
INJECTION INTRAVENOUS
Status: DISCONTINUED | OUTPATIENT
Start: 2019-12-26 | End: 2019-12-26

## 2019-12-26 RX ORDER — PROPOFOL 10 MG/ML
VIAL (ML) INTRAVENOUS
Status: DISCONTINUED | OUTPATIENT
Start: 2019-12-26 | End: 2019-12-26

## 2019-12-26 RX ORDER — SODIUM CHLORIDE 0.9 % (FLUSH) 0.9 %
10 SYRINGE (ML) INJECTION
Status: DISCONTINUED | OUTPATIENT
Start: 2019-12-26 | End: 2019-12-27 | Stop reason: HOSPADM

## 2019-12-26 RX ORDER — HYDROMORPHONE HYDROCHLORIDE 1 MG/ML
0.2 INJECTION, SOLUTION INTRAMUSCULAR; INTRAVENOUS; SUBCUTANEOUS EVERY 5 MIN PRN
Status: DISCONTINUED | OUTPATIENT
Start: 2019-12-26 | End: 2019-12-26

## 2019-12-26 RX ORDER — FENTANYL CITRATE 50 UG/ML
25 INJECTION, SOLUTION INTRAMUSCULAR; INTRAVENOUS EVERY 5 MIN PRN
Status: COMPLETED | OUTPATIENT
Start: 2019-12-26 | End: 2019-12-26

## 2019-12-26 RX ORDER — ONDANSETRON 2 MG/ML
INJECTION INTRAMUSCULAR; INTRAVENOUS
Status: DISCONTINUED | OUTPATIENT
Start: 2019-12-26 | End: 2019-12-26

## 2019-12-26 RX ORDER — MIDAZOLAM HYDROCHLORIDE 1 MG/ML
INJECTION, SOLUTION INTRAMUSCULAR; INTRAVENOUS
Status: DISCONTINUED | OUTPATIENT
Start: 2019-12-26 | End: 2019-12-26

## 2019-12-26 RX ORDER — DEXAMETHASONE SODIUM PHOSPHATE 4 MG/ML
INJECTION, SOLUTION INTRA-ARTICULAR; INTRALESIONAL; INTRAMUSCULAR; INTRAVENOUS; SOFT TISSUE
Status: DISCONTINUED | OUTPATIENT
Start: 2019-12-26 | End: 2019-12-26

## 2019-12-26 RX ORDER — OXYCODONE AND ACETAMINOPHEN 10; 325 MG/1; MG/1
1 TABLET ORAL ONCE
Status: COMPLETED | OUTPATIENT
Start: 2019-12-26 | End: 2019-12-26

## 2019-12-26 RX ORDER — ROCURONIUM BROMIDE 10 MG/ML
INJECTION, SOLUTION INTRAVENOUS
Status: DISCONTINUED | OUTPATIENT
Start: 2019-12-26 | End: 2019-12-26

## 2019-12-26 RX ORDER — GLYCOPYRROLATE 0.2 MG/ML
INJECTION INTRAMUSCULAR; INTRAVENOUS
Status: DISCONTINUED | OUTPATIENT
Start: 2019-12-26 | End: 2019-12-26

## 2019-12-26 RX ADMIN — CEFEPIME 2 G: 2 INJECTION, POWDER, FOR SOLUTION INTRAVENOUS at 01:12

## 2019-12-26 RX ADMIN — FENTANYL CITRATE 25 MCG: 50 INJECTION INTRAMUSCULAR; INTRAVENOUS at 12:12

## 2019-12-26 RX ADMIN — OXYCODONE HYDROCHLORIDE 5 MG: 5 SOLUTION ORAL at 12:12

## 2019-12-26 RX ADMIN — METRONIDAZOLE 500 MG: 500 INJECTION, SOLUTION INTRAVENOUS at 05:12

## 2019-12-26 RX ADMIN — OXYCODONE HYDROCHLORIDE 5 MG: 5 SOLUTION ORAL at 05:12

## 2019-12-26 RX ADMIN — GLYCOPYRROLATE 0.4 MG: 0.2 INJECTION, SOLUTION INTRAMUSCULAR; INTRAVENOUS at 11:12

## 2019-12-26 RX ADMIN — CEFEPIME 2 G: 2 INJECTION, POWDER, FOR SOLUTION INTRAVENOUS at 05:12

## 2019-12-26 RX ADMIN — FENTANYL CITRATE 50 MCG: 50 INJECTION, SOLUTION INTRAMUSCULAR; INTRAVENOUS at 11:12

## 2019-12-26 RX ADMIN — POTASSIUM CHLORIDE 40 MEQ: 20 SOLUTION ORAL at 05:12

## 2019-12-26 RX ADMIN — PHENYLEPHRINE HYDROCHLORIDE 50 MCG: 10 INJECTION INTRAVENOUS at 11:12

## 2019-12-26 RX ADMIN — DEXAMETHASONE SODIUM PHOSPHATE 8 MG: 4 INJECTION, SOLUTION INTRAMUSCULAR; INTRAVENOUS at 10:12

## 2019-12-26 RX ADMIN — FENTANYL CITRATE 25 MCG: 50 INJECTION INTRAMUSCULAR; INTRAVENOUS at 01:12

## 2019-12-26 RX ADMIN — OXYCODONE HYDROCHLORIDE AND ACETAMINOPHEN 1 TABLET: 10; 325 TABLET ORAL at 04:12

## 2019-12-26 RX ADMIN — ROCURONIUM BROMIDE 30 MG: 10 INJECTION, SOLUTION INTRAVENOUS at 10:12

## 2019-12-26 RX ADMIN — METRONIDAZOLE 500 MG: 500 INJECTION, SOLUTION INTRAVENOUS at 09:12

## 2019-12-26 RX ADMIN — FENTANYL CITRATE 50 MCG: 50 INJECTION, SOLUTION INTRAMUSCULAR; INTRAVENOUS at 10:12

## 2019-12-26 RX ADMIN — NEOSTIGMINE METHYLSULFATE 3.5 MG: 0.5 INJECTION INTRAVENOUS at 11:12

## 2019-12-26 RX ADMIN — AMLODIPINE BESYLATE 10 MG: 10 TABLET ORAL at 09:12

## 2019-12-26 RX ADMIN — OXYCODONE HYDROCHLORIDE 5 MG: 5 SOLUTION ORAL at 09:12

## 2019-12-26 RX ADMIN — PHENYLEPHRINE HYDROCHLORIDE 200 MCG: 10 INJECTION INTRAVENOUS at 10:12

## 2019-12-26 RX ADMIN — MIDAZOLAM HYDROCHLORIDE 1 MG: 1 INJECTION, SOLUTION INTRAMUSCULAR; INTRAVENOUS at 10:12

## 2019-12-26 RX ADMIN — MELATONIN 1000 UNITS: at 09:12

## 2019-12-26 RX ADMIN — SODIUM CHLORIDE, SODIUM GLUCONATE, SODIUM ACETATE, POTASSIUM CHLORIDE, MAGNESIUM CHLORIDE, SODIUM PHOSPHATE, DIBASIC, AND POTASSIUM PHOSPHATE: .53; .5; .37; .037; .03; .012; .00082 INJECTION, SOLUTION INTRAVENOUS at 10:12

## 2019-12-26 RX ADMIN — CEFEPIME 2 G: 2 INJECTION, POWDER, FOR SOLUTION INTRAVENOUS at 10:12

## 2019-12-26 RX ADMIN — PANTOPRAZOLE SODIUM 40 MG: 40 TABLET, DELAYED RELEASE ORAL at 09:12

## 2019-12-26 RX ADMIN — ONDANSETRON 4 MG: 2 INJECTION INTRAMUSCULAR; INTRAVENOUS at 11:12

## 2019-12-26 RX ADMIN — PROPOFOL 140 MG: 10 INJECTION, EMULSION INTRAVENOUS at 10:12

## 2019-12-26 RX ADMIN — POTASSIUM CHLORIDE 40 MEQ: 20 SOLUTION ORAL at 04:12

## 2019-12-26 RX ADMIN — PHENYLEPHRINE HYDROCHLORIDE 100 MCG: 10 INJECTION INTRAVENOUS at 10:12

## 2019-12-26 RX ADMIN — METRONIDAZOLE 500 MG: 500 INJECTION, SOLUTION INTRAVENOUS at 02:12

## 2019-12-26 RX ADMIN — HYDROMORPHONE HYDROCHLORIDE 1 MG: 1 INJECTION, SOLUTION INTRAMUSCULAR; INTRAVENOUS; SUBCUTANEOUS at 05:12

## 2019-12-26 NOTE — ASSESSMENT & PLAN NOTE
- visualized purulence upon removal of stent  - does not appear septic  - continue on cefepime/flagyl for now, can likely de-escalate to cipro/flagyl to complete course following PTC  - daily CBC/CMP

## 2019-12-26 NOTE — ASSESSMENT & PLAN NOTE
74 year old female with a history of GB Cancer undergoing palliative chemotherapy (follows at [facility]) admitted after her ERCP on 12/24/19 due to concern for cholangitis and inability to appropriately drain the biliary tree endoscopically. Patient has remained stable on antibiotics and is scheduled for PTC by IR today. In approximately 6 weeks we can attempt repeat outpatient ERCP in hopes of internalizing her drain.     Recommendations:  --NPO for PTC by IR today (after the procedure if she has completed 2 days of liquids can advance slowly to a soft mechanical diet)  --Daily CMP and CBC  --Continue antibiotics (do recommend completing a course of antibiotics for cholangitis)  --Will attempt to internalize drain in 6 weeks, will follow PTC today and based on outcome will work on scheduling outpatient ERCP

## 2019-12-26 NOTE — PROCEDURES
Radiology Post Procedure Note:     Procedure: IR External Biliary Drain Placement    (s): Suzanne    Blood Loss: Minimal    Specimen: None    Findings:   Patient came to IR and under imaging guidance had a peripheral right biliary duct accessed. Next, cholangiogram obtained.     Wire was negotiated into the small bowel however after multiple attempts with multiple wires and balloon dilation, drain unable to be passed by duodenal stent into small bowel. External Drain Left.     Leave drain to gravity drainage. Will attempt to internalize drain after 3-4 days to allow tract healing to begin.     Levi BERRY M.D. personally reviewed and agree with the above dictated note.

## 2019-12-26 NOTE — TREATMENT PLAN
AES Treatment Plan  12/26/2019  1:31 PM    Patient is now s/p PTC with IR.  Will place orders for outpatient ERCP in 6 weeks.   Additional recommendations as per our progress note earlier today.  We thank you for this consultation, we will sign off at this time, please call with any additional questions or concern.    Syed Mccarthy M.D.  Gastroenterology Fellow, PGY-VI  Pager: [phone]  [facility]

## 2019-12-26 NOTE — TRANSFER OF CARE
Anesthesia Transfer of Care Note    Patient: Angelica Tomlinson    Procedure(s) Performed: Procedure(s) (LRB):  CHOLANGIOGRAM (N/A)    Patient location: PACU    Anesthesia Type: general    Transport from OR: Transported from OR on 6-10 L/min O2 by face mask with adequate spontaneous ventilation    Post pain: adequate analgesia    Post assessment: no apparent anesthetic complications    Post vital signs: stable    Level of consciousness: awake and alert    Nausea/Vomiting: no nausea/vomiting    Complications: none    Transfer of care protocol was followed      Last vitals:   Visit Vitals  BP (!) 121/56 (BP Location: Left arm, Patient Position: Lying)   Pulse 71   Temp 36.2 °C (97.2 °F) (Oral)   Resp 18   Ht 6' (1.829 m)   Wt 63.5 kg (140 lb)   SpO2 (!) 94%   Breastfeeding? No   BMI 18.99 kg/m²

## 2019-12-26 NOTE — H&P
Vascular and Interventional Radiology History & Physical    Date:  12/26/2019    Chief Complaint:   Suspected cholangitis.    History of Present Illness:  Angelica Tomlinson is a 74 y.o. female who presents for PTC and possible intervention. 74 y.o. female, admitted 12/24/2019 with abdominal pain. Dxd in 2016 s/p CXRT. Developed biliary obstruction and came in as outpt for ERCP today. Duodenal stent was placed. One biliary stent was exchanged without issue. Mild purulence was noted. Second could not be exchanged 2/2 severe surrounding ulceration. Pt was monitored and admitted to medicine for IV antibiotics and IR evaluation.    T bili stable at 0.9. No leukocytosis. Not septic appearing. But endoscopy team concerned due to extent of inflammation seen during scope.    Past Medical History:  Past Medical History:   Diagnosis Date    Cancer     Gallbladder    Constipation     Cyst of breast, left, benign solitary     Diverticulosis     Duodenal mass 5/16/2016    Helicobacter pylori gastritis     Hiatal hernia     Hx of colonic polyp     Tobacco abuse 10/9/2019       Past Surgical History:  Past Surgical History:   Procedure Laterality Date    APPENDECTOMY      colon polyps      COLONOSCOPY      ENDOSCOPIC ULTRASOUND OF UPPER GASTROINTESTINAL TRACT N/A 10/25/2019    Procedure: ULTRASOUND, UPPER GI TRACT, ENDOSCOPIC;  Surgeon: Luis Antonio Lu MD;  Location: [facility];  Service: Endoscopy;  Laterality: N/A;    ERCP N/A 10/21/2019    Procedure: ERCP (ENDOSCOPIC RETROGRADE CHOLANGIOPANCREATOGRAPHY);  Surgeon: Luis Antonio Lu MD;  Location: [facility];  Service: Endoscopy;  Laterality: N/A;    ERCP N/A 12/17/2019    Procedure: ERCP (ENDOSCOPIC RETROGRADE CHOLANGIOPANCREATOGRAPHY);  Surgeon: Monse Rayo MD;  Location: [facility];  Service: Endoscopy;  Laterality: N/A;    ERCP N/A 12/24/2019    Procedure: ERCP (ENDOSCOPIC RETROGRADE CHOLANGIOPANCREATOGRAPHY);  Surgeon: Monse Rayo MD;  Location:  [facility] ENDO (2ND FLR);  Service: Endoscopy;  Laterality: N/A;  Will need duodenal stent as well as permanent biliary stents (likely 6 x 8 and 6 x 10 epic stents).  Dr Arnav Rayo    ERCP W/ PLASTIC STENT PLACEMENT  10/21/2019    ESOPHAGOGASTRODUODENOSCOPY N/A 10/25/2019    Procedure: EGD (ESOPHAGOGASTRODUODENOSCOPY);  Surgeon: Luis Antonio Lu MD;  Location: [facility];  Service: Endoscopy;  Laterality: N/A;        Sedation History:    Denies any adverse reactions.     Social History:  Social History     Tobacco Use    Smoking status: Former Smoker     Packs/day: 0.50     Years: 42.00     Pack years: 21.00     Types: Cigarettes     Start date: 1/1/1977    Smokeless tobacco: Never Used    Tobacco comment: Pt states that she no longer smokes regularly- only occasionally.   Substance Use Topics    Alcohol use: No    Drug use: No        Home Medications:   Prior to Admission medications    Medication Sig Start Date End Date Taking? Authorizing Provider   amLODIPine (NORVASC) 10 MG tablet Take 1 tablet (10 mg total) by mouth once daily. 10/9/19  Yes Miguel Barroso MD   aspirin 81 MG Chew  2/26/16  Yes Historical Provider, MD   pantoprazole (PROTONIX) 40 MG tablet Take 1 tablet (40 mg total) by mouth once daily. 10/23/19 10/22/20 Yes Marin Beach MD   polyethylene glycol (MIRALAX) 17 gram PwPk Take by mouth.   Yes Historical Provider, MD   vitamin D 1000 units Tab Take 185 mg by mouth once daily.   Yes Historical Provider, MD   amoxicillin-clavulanate 875-125mg (AUGMENTIN) 875-125 mg per tablet Take 1 tablet by mouth 2 (two) times daily. for 7 days 12/24/19 12/31/19  Monse Rayo MD   cholestyramine (QUESTRAN) 4 gram packet Mix 1 packet (4 g total) and take by mouth 2 (two) times daily. for 10 days 10/22/19 11/1/19  Marin Beach MD       Inpatient Medications:    Current Facility-Administered Medications:     0.9%  NaCl infusion, , Intravenous, Continuous, Monse Rayo MD, Last Rate: 10 mL/hr at  12/24/19 0739    0.9%  NaCl infusion, , Intravenous, Continuous, Monse Rayo MD, Last Rate: 20 mL/hr at 12/24/19 1333    amLODIPine tablet 10 mg, 10 mg, Oral, Daily, John Eubanks MD, 10 mg at 12/26/19 0917    ceFEPIme injection 2 g, 2 g, Intravenous, Q8H, John Eubanks MD, 2 g at 12/26/19 0159    dextrose 10% (D10W) Bolus, 12.5 g, Intravenous, PRN, John Eubanks MD    dextrose 10% (D10W) Bolus, 25 g, Intravenous, PRN, John Eubanks MD    glucagon (human recombinant) injection 1 mg, 1 mg, Intramuscular, PRN, John Eubanks MD    glucose chewable tablet 16 g, 16 g, Oral, PRN, John Eubanks MD    glucose chewable tablet 24 g, 24 g, Oral, PRN, John Eubanks MD    metronidazole IVPB 500 mg, 500 mg, Intravenous, Q8H, John Eubanks MD, Last Rate: 100 mL/hr at 12/26/19 0502, 500 mg at 12/26/19 0502    morphine injection 2 mg, 2 mg, Intravenous, Q4H PRN, John Eubanks MD, 2 mg at 12/24/19 1606    ondansetron disintegrating tablet 8 mg, 8 mg, Oral, Q8H PRN, John Eubanks MD, 8 mg at 12/24/19 1613    oxyCODONE 5 mg/5 mL solution 5 mg, 5 mg, Oral, Q4H PRN, John Eubanks MD, 5 mg at 12/26/19 0504    pantoprazole EC tablet 40 mg, 40 mg, Oral, Daily, John Eubanks MD, 40 mg at 12/26/19 0917    polyethylene glycol packet 17 g, 17 g, Oral, Daily, John Eubanks MD, 17 g at 12/25/19 0927    promethazine tablet 25 mg, 25 mg, Oral, Q6H PRN, John Eubanks MD    sodium chloride 0.9% flush 10 mL, 10 mL, Intravenous, PRN, John Eubanks MD    vitamin D 1000 units tablet 1,000 Units, 1,000 Units, Oral, Daily, John Eubanks MD, 1,000 Units at 12/26/19 0917    Facility-Administered Medications Ordered in Other Encounters:     0.9%  NaCl infusion, , Intravenous, Continuous, Luis Antonio Lu MD, Stopped at 12/17/19 1537    sodium chloride 0.9% flush 10 mL, 10 mL,  Intravenous, PRN, Luis Antonio Lu MD     Anticoagulants/Antiplatelets:   None.    Allergies:   Review of patient's allergies indicates:  No Known Allergies    Review of Systems:   As documented in primary provider H&P.    Vital Signs (Most Recent):  Temp: 97.2 °F (36.2 °C) (12/26/19 0757)  Pulse: 71 (12/26/19 0757)  Resp: 18 (12/26/19 0757)  BP: (!) 121/56 (12/26/19 0757)  SpO2: (!) 94 % (12/26/19 0757)    Physical Exam:  No acute distress, laying comfortably in bed, pleasant and cooperative  Regular rate and rhythm  Breathing unlabored  Abdomen benign  Extremities warm and well perfused    Sedation Exam:  ASA: Per anesthesia   Mallampati: Per anesthesia    Laboratory  Lab Results   Component Value Date    INR 1.1 10/18/2019       Lab Results   Component Value Date    WBC 3.15 (L) 12/25/2019    HGB 11.6 (L) 12/25/2019    HCT 36.2 (L) 12/25/2019    MCV 93 12/25/2019     12/25/2019      Lab Results   Component Value Date    GLU 80 12/25/2019     12/25/2019    K 3.6 12/25/2019     12/25/2019    CO2 25 12/25/2019    BUN 9 12/25/2019    CREATININE 0.6 12/25/2019    CALCIUM 8.4 (L) 12/25/2019    MG 1.7 12/25/2019    ALT 14 12/25/2019    AST 23 12/25/2019    ALBUMIN 2.5 (L) 12/25/2019    BILITOT 0.9 12/25/2019    BILIDIR >14.0 (H) 10/18/2019       ASSESSMENT/PLAN:   75yo c suspected cholangitis.                      Sedation Plan: Per anesthesia  Patient will undergo: PTC and possible intervention.      Real Mike MD  R4, Diagnostic and Interventional Radiology  Pager: [phone]

## 2019-12-26 NOTE — PROGRESS NOTES
Patient returned from IR. Report received from PACU nurse. Vital signs stable. Dressing to right abdomen clean, dry, and intact. Patient complaining of pain to drain site. Will continue to monitor.

## 2019-12-26 NOTE — ASSESSMENT & PLAN NOTE
- recurrent gallbladder cancer on palliative chemo with mFOLFOX6 at [facility]  - will f/u with outpatient Oncologist upon discharge

## 2019-12-26 NOTE — ASSESSMENT & PLAN NOTE
- post-ERCP pain  - lipase <3xULN  - suspect 2/2 duodenal stenting/cholangitis  - oxycodone 5mg prn, will reassess

## 2019-12-26 NOTE — ANESTHESIA PREPROCEDURE EVALUATION
[facility]  Anesthesia Pre-Operative Evaluation       Patient Name: Angelica Tomlinson  YOB: 1945  MRN: [id number]  [facility]: [id number]      Code Status: Full Code   Date of Procedure: 12/24/2019  Procedure: Procedure(s) (LRB):  CHOLANGIOGRAM (N/A)  Anesthesia: General  Pre-Operative Diagnosis: Final diagnoses:  None  Proceduralist/Surgeon(s) and Role:     * Monse Rayo MD - Primary    SUBJECTIVE:   Angelica Tomlinson is a 74 y.o. female here for repeat ERCP with stent placement under MAC/GA. Underwent ERCP on 10/21/19, 10/25/19, and 12/17  with NAAC. Tolerated GA w natural airway well.    LDA:        Port A Cath Single Lumen right subclavian (Active)   Number of days:       Anesthesia Evaluation      Airway   Mallampati: III  TM distance: Normal, at least 6 cm  Neck ROM: Normal ROM  Dental    (+) Upper Dentures, Lower Dentures and Periodontal disease, Severe        Pulmonary    Cardiovascular   Exercise tolerance: good  (+) hypertension well controlled,     Rate: Normal    Neuro/Psych      GI/Hepatic/Renal    (+) hiatal hernia, GERD,     Endo/Other    Abdominal                     ALLERGIES:   Review of patient's allergies indicates:  No Known Allergies  MEDICATIONS:     Current Outpatient Medications on File Prior to Visit   Medication Sig Dispense Refill Last Dose    amLODIPine (NORVASC) 10 MG tablet Take 1 tablet (10 mg total) by mouth once daily. 90 tablet 3 Past Week at Unknown time    amoxicillin-clavulanate 875-125mg (AUGMENTIN) 875-125 mg per tablet Take 1 tablet by mouth 2 (two) times daily. for 7 days 14 tablet 0     aspirin 81 MG Chew    12/23/2019 at Unknown time    cholestyramine (QUESTRAN) 4 gram packet Mix 1 packet (4 g total) and take by mouth 2 (two) times daily. for 10 days 20 packet 0 Taking    pantoprazole (PROTONIX) 40 MG tablet Take 1 tablet (40 mg total) by mouth once daily. 30 tablet 11 Past Week at Unknown time    polyethylene glycol  (MIRALAX) 17 gram PwPk Take by mouth.   12/23/2019 at Unknown time    vitamin D 1000 units Tab Take 185 mg by mouth once daily.   12/23/2019 at Unknown time     No current facility-administered medications for this visit.      Current Outpatient Medications   Medication Sig Dispense Refill    amoxicillin-clavulanate 875-125mg (AUGMENTIN) 875-125 mg per tablet Take 1 tablet by mouth 2 (two) times daily. for 7 days 14 tablet 0     Facility-Administered Medications Ordered in Other Visits   Medication Dose Route Frequency Provider Last Rate Last Dose    0.9%  NaCl infusion   Intravenous Continuous Luis Antonio Lu MD   Stopped at 12/17/19 1537    0.9%  NaCl infusion   Intravenous Continuous Monse Rayo MD 10 mL/hr at 12/24/19 0739      0.9%  NaCl infusion   Intravenous Continuous Monse Rayo MD 20 mL/hr at 12/24/19 1333      amLODIPine tablet 10 mg  10 mg Oral Daily John Eubanks MD   10 mg at 12/26/19 0917    ceFEPIme injection 2 g  2 g Intravenous Q8H John Eubanks MD   2 g at 12/26/19 0159    dextrose 10% (D10W) Bolus  12.5 g Intravenous PRN John Eubanks MD        dextrose 10% (D10W) Bolus  25 g Intravenous PRN John Eubanks MD        glucagon (human recombinant) injection 1 mg  1 mg Intramuscular PRN John Eubanks MD        glucose chewable tablet 16 g  16 g Oral PRN John Eubanks MD        glucose chewable tablet 24 g  24 g Oral PRN John Eubanks MD        metronidazole IVPB 500 mg  500 mg Intravenous Q8H John Eubanks  mL/hr at 12/26/19 0502 500 mg at 12/26/19 0502    morphine injection 2 mg  2 mg Intravenous Q4H PRN John Eubanks MD   2 mg at 12/24/19 1606    ondansetron disintegrating tablet 8 mg  8 mg Oral Q8H PRN John Eubanks MD   8 mg at 12/24/19 1613    oxyCODONE 5 mg/5 mL solution 5 mg  5 mg Oral Q4H PRN John Eubanks MD   5 mg at 12/26/19 0504    pantoprazole  EC tablet 40 mg  40 mg Oral Daily John Eubanks MD   40 mg at 12/26/19 0917    polyethylene glycol packet 17 g  17 g Oral Daily John Eubanks MD   17 g at 12/25/19 0927    promethazine tablet 25 mg  25 mg Oral Q6H PRN John Eubanks MD        sodium chloride 0.9% flush 10 mL  10 mL Intravenous PRN Luis Antonio Lu MD        sodium chloride 0.9% flush 10 mL  10 mL Intravenous PRN John Eubanks MD        vitamin D 1000 units tablet 1,000 Units  1,000 Units Oral Daily John Eubanks MD   1,000 Units at 12/26/19 0917          History:   There are no hospital problems to display for this patient.    Patient Active Problem List   Diagnosis    Hiatal hernia    Gallbladder cancer    Transaminitis    Jaundice    HTN (hypertension)    Obstructive jaundice    Biliary obstruction    Biliary stricture    Cholangitis    Duodenal stenosis    Pain    Hypokalemia      Past Medical History:   Diagnosis Date    Cancer     Gallbladder    Constipation     Cyst of breast, left, benign solitary     Diverticulosis     Duodenal mass 5/16/2016    Helicobacter pylori gastritis     Hiatal hernia     Hx of colonic polyp     Tobacco abuse 10/9/2019     Past Surgical History:   Procedure Laterality Date    APPENDECTOMY      colon polyps      COLONOSCOPY      ENDOSCOPIC ULTRASOUND OF UPPER GASTROINTESTINAL TRACT N/A 10/25/2019    Procedure: ULTRASOUND, UPPER GI TRACT, ENDOSCOPIC;  Surgeon: Luis Antonio Lu MD;  Location: [facility];  Service: Endoscopy;  Laterality: N/A;    ERCP N/A 10/21/2019    Procedure: ERCP (ENDOSCOPIC RETROGRADE CHOLANGIOPANCREATOGRAPHY);  Surgeon: Luis Antonio Lu MD;  Location: [facility];  Service: Endoscopy;  Laterality: N/A;    ERCP N/A 12/17/2019    Procedure: ERCP (ENDOSCOPIC RETROGRADE CHOLANGIOPANCREATOGRAPHY);  Surgeon: Monse Rayo MD;  Location: [facility];  Service: Endoscopy;  Laterality: N/A;    ERCP N/A 12/24/2019    Procedure:  "ERCP (ENDOSCOPIC RETROGRADE CHOLANGIOPANCREATOGRAPHY);  Surgeon: Monse Rayo MD;  Location: [facility] ENDO ([address] [place] [address]);  Service: Endoscopy;  Laterality: N/A;  Will need duodenal stent as well as permanent biliary stents (likely 6 x 8 and 6 x 10 epic stents).  Dr Arnav Rayo    ERCP W/ PLASTIC STENT PLACEMENT  10/21/2019    ESOPHAGOGASTRODUODENOSCOPY N/A 10/25/2019    Procedure: EGD (ESOPHAGOGASTRODUODENOSCOPY);  Surgeon: Luis Antonio Lu MD;  Location: [facility] ENDO;  Service: Endoscopy;  Laterality: N/A;     Alcohol use:    Social History     Substance and Sexual Activity   Alcohol Use No          OBJECTIVE:   Last 3 sets of Vitals    Vitals - 1 value per visit 12/24/2019 12/25/2019 12/26/2019   SYSTOLIC - - 121   DIASTOLIC - - 56   PULSE - - 71   TEMPERATURE - - 97.2   RESPIRATIONS - - 18   SPO2 - - 94   Weight (lb) - 140 -   Weight (kg) - 63.504 -   HEIGHT - 6' 0" -   BODY MASS INDEX 18.99 - -   VISIT REPORT - - -   Pain Score  - - -       Vital Signs (Most Recent):    Vital Signs Range (Last 24H):  Temp:  [35.9 °C (96.6 °F)-36.8 °C (98.2 °F)]   Pulse:  [55-71]   Resp:  [14-20]   BP: (108-129)/(52-59)   SpO2:  [94 %-100 %]          Intake/Output Summary (Last 24 hours) at 12/26/2019 1044  Last data filed at 12/25/2019 1400  Gross per 24 hour   Intake --   Output 0 ml   Net 0 ml       There is no height or weight on file to calculate BMI.     Significant Labs:  Lab Results   Component Value Date    WBC 4.20 12/26/2019    HGB 10.4 (L) 12/26/2019    HCT 33.1 (L) 12/26/2019     12/26/2019    CHOL 282 (H) 10/09/2019    TRIG 111 10/09/2019    HDL 49 10/09/2019    ALT 10 12/26/2019    AST 17 12/26/2019     12/26/2019    K 3.0 (L) 12/26/2019     12/26/2019    CREATININE 0.6 12/26/2019    BUN 11 12/26/2019    CO2 24 12/26/2019    TSH 1.670 10/09/2019    INR 1.1 10/18/2019    HGBA1C 4.8 10/09/2019     No results found for: PREGTESTUR, PREGSERUM, HCG, HCGQUANT   No LMP recorded. Patient is " postmenopausal.    EKG:   Results for orders placed or performed during the hospital encounter of 05/03/16   SCHEDULED EKG 12-LEAD (to Muse)    Collection Time: 05/03/16 12:09 PM    Narrative    Test Reason : K31.89  Blood Pressure : ** mmHG  Vent. Rate : 061 BPM     Atrial Rate : 061 BPM     P-R Int : 188 ms          QRS Dur : 110 ms      QT Int : 438 ms       P-R-T Axes : 029 002 072 degrees     QTc Int : 440 ms    Normal sinus rhythm  Normal ECG  No previous ECGs available  Confirmed by FERNANDO ROD MD ([id number]) on 5/3/2016 2:40:27 PM    Referred By: REFUGIO LARSEN           Confirmed By:FERNANDO ROD MD       TTE:  No results found for this or any previous visit.  No results found for: EF  No results found for this or any previous visit.  GIOVANNY:  No results found for this or any previous visit.  Stress Test:  No results found for this or any previous visit.   LHC:  No results found for this or any previous visit.   PFT:  No results found for: FEV1, FVC, JTE8QMP, TLC, DLCO     ASSESSMENT/PLAN:         Pre-op Assessment    I have reviewed the Patient Summary Reports.    I have reviewed the Nursing Notes.   I have reviewed the Medications.     Review of Systems  Anesthesia Hx:  No problems with previous Anesthesia  History of prior surgery of interest to airway management or planning: Denies Family Hx of Anesthesia complications.   Denies Personal Hx of Anesthesia complications.   Social:  No Alcohol Use, Smoker    Hematology/Oncology:         -- Anemia: Current/Recent Cancer.   EENT/Dental:EENT/Dental Normal   Cardiovascular:   Exercise tolerance: good Hypertension, well controlled    Pulmonary:  Pulmonary Normal    Renal/:  Renal/ Normal     Hepatic/GI:   Hiatal Hernia, GERD Gallbladder cancer stage 3b  Transaminitis   Musculoskeletal:  Musculoskeletal Normal    Neurological:  Neurology Normal    Endocrine:  Endocrine Normal    Dermatological:  Skin Normal    Psych:  Psychiatric Normal           Physical  Exam  General:  Well nourished    Airway/Jaw/Neck:  Airway Findings: Mouth Opening: Normal Tongue: Normal  General Airway Assessment: Adult  Mallampati: III  Improves to II with phonation.  TM Distance: Normal, at least 6 cm  Jaw/Neck Findings:  Neck ROM: Normal ROM      Dental:  Dental Findings: Upper Dentures, Lower Dentures, Periodontal disease, Severe    Chest/Lungs:  Chest/Lungs Findings: Clear to auscultation, Normal Respiratory Rate     Heart/Vascular:  Heart Findings: Rate: Normal  Rhythm: Regular Rhythm  Sounds: Normal        Mental Status:  Mental Status Findings:  Cooperative, Alert and Oriented         Anesthesia Plan  Type of Anesthesia, risks & benefits discussed:  Anesthesia Type:  general  Patient's Preference:   Intra-op Monitoring Plan: standard ASA monitors  Intra-op Monitoring Plan Comments:   Post Op Pain Control Plan: per primary service following discharge from PACU  Post Op Pain Control Plan Comments:   Induction:   IV  Beta Blocker:  Patient is not currently on a Beta-Blocker (No further documentation required).       Informed Consent: Patient understands risks and agrees with Anesthesia plan.  Questions answered. Anesthesia consent signed with patient.  ASA Score: 3     Day of Surgery Review of History & Physical:    H&P update referred to the surgeon.  H&P completed by Anesthesiologist.   Anesthesia Plan Notes: Chart reviewed, patient interviewed and examined.  The anesthetic plan was explained.  Risks, benefits, and alternatives were discussed. Questions were answered and the consent was signed.        AZ Saravia M.D.         Ready For Surgery From Anesthesia Perspective.

## 2019-12-26 NOTE — PROGRESS NOTES
[facility] Hospital Medicine  Progress Note    Patient Name: Angelica Tomlinson  MRN: [id number]  Patient Class: IP- Inpatient   Admission Date: 12/24/2019  Length of Stay: 2 days  Attending Physician: John Eubanks, *  Primary Care Provider: Miguel Barroso MD    [facility] Medicine Team: [facility] HOSP MED A John Eubanks MD    Subjective:     Principal Problem:Cholangitis        HPI:  Ms. 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 During procedure, duodenal stent placed without complication. One biliary stent exchanged and with mild purulence observed upon exchange. Second stent was unable to be exchanged due to severe ulceration around area. Following procedure, she had nausea and abdominal pain, which has since resolved with antiemetic/pain medication. She otherwise denies any fevers, chills, chest pain, shortness of breath, diarrhea, lightheadedness, dizziness.    AES requests admission for IV antibiotics for mild cholangitis and IR consultation for consideration of PTC.    Overview/Hospital Course:  Admitted from [facility] service due to post-ERCP pain and evidence of cholangitis following stent exchange. Started on cefepime/flagyl. IR consulted for PTC.    Interval History: Pain improved today with pain meds. Denies fever, chills, nausea. Awaiting PTC this am.    Review of Systems   Constitutional: Positive for appetite change and fatigue. Negative for fever.   Respiratory: Negative for chest tightness and shortness of breath.    Cardiovascular: Negative for chest pain.   Gastrointestinal: Positive for abdominal pain and nausea. Negative for diarrhea and vomiting.     Objective:     Vital Signs (Most Recent):  Temp: 97.3 °F (36.3 °C) (12/26/19 1405)  Pulse: 61 (12/26/19  1405)  Resp: 18 (12/26/19 1405)  BP: (!) 142/65 (12/26/19 1405)  SpO2: 95 % (12/26/19 1405) Vital Signs (24h Range):  Temp:  [96.6 °F (35.9 °C)-98.2 °F (36.8 °C)] 97.3 °F (36.3 °C)  Pulse:  [55-71] 61  Resp:  [14-20] 18  SpO2:  [94 %-100 %] 95 %  BP: (110-169)/(52-71) 142/65     Weight: 63.5 kg (140 lb)  Body mass index is 18.99 kg/m².    Intake/Output Summary (Last 24 hours) at 12/26/2019 1455  Last data filed at 12/26/2019 1212  Gross per 24 hour   Intake 700 ml   Output --   Net 700 ml      Physical Exam   Constitutional: She is oriented to person, place, and time. She appears well-developed and well-nourished. No distress.   HENT:   Head: Normocephalic and atraumatic.   Nose: Nose normal.   Eyes: Pupils are equal, round, and reactive to light. EOM are normal. No scleral icterus.   Neck: Normal range of motion. No JVD present. No tracheal deviation present.   Cardiovascular: Normal rate, regular rhythm and normal heart sounds.   Pulmonary/Chest: Effort normal and breath sounds normal. No respiratory distress.   Abdominal: Soft. She exhibits no distension. There is tenderness (mild RUQ and epigastric).   Musculoskeletal: She exhibits no edema or deformity.   Neurological: She is alert and oriented to person, place, and time.   Skin: Skin is warm and dry. No rash noted. She is not diaphoretic.   Psychiatric: She has a normal mood and affect. Her behavior is normal.   Vitals reviewed.      MELD-Na score: 19 at 10/20/2019  5:15 AM  MELD score: 18 at 10/20/2019  5:15 AM  Calculated from:  Serum Creatinine: 0.8 mg/dL (Rounded to 1 mg/dL) at 10/20/2019  5:15 AM  Serum Sodium: 135 mmol/L at 10/20/2019  5:15 AM  Total Bilirubin: 17.9 mg/dL at 10/20/2019  5:15 AM  INR(ratio): 1.1 at 10/18/2019  9:36 PM  Age: 74 years    Significant Labs:  CBC:  Recent Labs   Lab 12/25/19 0417 12/26/19  0520   WBC 3.15* 4.20   HGB 11.6* 10.4*   HCT 36.2* 33.1*    213     CMP:  Recent Labs   Lab 12/25/19 0417 12/26/19  0520     140   K 3.6 3.0*    108   CO2 25 24   GLU 80 48*   BUN 9 11   CREATININE 0.6 0.6   CALCIUM 8.4* 7.3*   PROT 5.7* 4.7*   ALBUMIN 2.5* 2.1*   BILITOT 0.9 0.7   ALKPHOS 137* 98   AST 23 17   ALT 14 10   ANIONGAP 10 8   EGFRNONAA >60.0 >60.0     PTINR:  No results for input(s): INR in the last 48 hours.    Significant Procedures:   Dobutamine Stress Test with Color Flow: No results found for this or any previous visit.    None      Assessment/Plan:      * Cholangitis  - visualized purulence upon removal of stent  - does not appear septic  - continue on cefepime/flagyl for now, can likely de-escalate to cipro/flagyl to complete course following PTC  - daily CBC/CMP      Hypokalemia  - replete prn  - check Mg    Pain  - post-ERCP pain  - lipase <3xULN  - suspect 2/2 duodenal stenting/cholangitis  - oxycodone 5mg prn, will reassess      Duodenal stenosis  - s/p stent placement  - clear liquid diet x2 days, then advance to mechanical soft as tolerated      Biliary stricture  - IR consulted for PTC, to perform today      Biliary obstruction  - see above      HTN (hypertension)  - controlled  - continue home amlodipine 10mg    Gallbladder cancer  - recurrent gallbladder cancer on palliative chemo with [id number] at [facility]  - will f/u with outpatient Oncologist upon discharge        VTE Risk Mitigation (From admission, onward)         Ordered     IP VTE HIGH RISK PATIENT  Once      12/24/19 1316                      John Eubanks MD  Department of Hospital Medicine   [facility]

## 2019-12-26 NOTE — PLAN OF CARE
Patient AO x 4. Complains of pain 7/10 that does decrease with PRN pain medication. No falls or injuries during shift. Patient NPO since 0000. Currently resting with all safety precautions in place. Will continue to monitor.

## 2019-12-26 NOTE — NURSING
Pt arrived to IR room 188 for PTC and possible intervention  . Pt oriented to unit and staff. Plan of care reviewed with patient, patient verbalizes understanding. Comfort measures utilized. Pt safely transferred from stretcher to procedural table. Fall risk reviewed with patient, fall risk interventions maintained. Safety strap applied, positioner pillows utilized to minimize pressure points. Blankets applied. Pt prepped and draped utilizing standard sterile technique. Patient placed on continuous monitoring, as required by sedation policy. Timeouts completed utilizing standard universal time-out, per department and facility policy. RN to remain at bedside, continuous monitoring maintained. Pt resting comfortably. Denies pain/discomfort. Will continue to monitor. See flow sheets for monitoring, medication administration, and updates.

## 2019-12-26 NOTE — NURSING
10-FR drain placed to right lateral abdomen successfully, dressing CDI, Anes @ bs, will transfer pt to PACU to recover, will give report to PACU nurse

## 2019-12-26 NOTE — SUBJECTIVE & OBJECTIVE
Subjective:     Interval History:   VS stable with no fevers.  Morning labs currently in process.  Patient reports feeling well as she thinks that while being in the hospital her pain has been better controlled.     Review of Systems   Constitutional: Positive for activity change, appetite change, fatigue and unexpected weight change. Negative for chills, diaphoresis and fever.   HENT: Negative for trouble swallowing and voice change.    Respiratory: Negative for cough and shortness of breath.    Cardiovascular: Negative for chest pain and palpitations.   Gastrointestinal: Positive for abdominal pain (improving). Negative for abdominal distention, anal bleeding, blood in stool, constipation, diarrhea, nausea, rectal pain and vomiting.   Genitourinary: Negative for dysuria, frequency, hematuria and urgency.   Musculoskeletal: Negative for back pain and neck pain.   Skin: Negative for color change, pallor, rash and wound.   Neurological: Negative for dizziness, syncope, weakness and light-headedness.     Objective:     Vital Signs (Most Recent):  Temp: 97.2 °F (36.2 °C) (12/26/19 0757)  Pulse: 71 (12/26/19 0757)  Resp: 18 (12/26/19 0757)  BP: (!) 121/56 (12/26/19 0757)  SpO2: (!) 94 % (12/26/19 0757) Vital Signs (24h Range):  Temp:  [96.6 °F (35.9 °C)-98.2 °F (36.8 °C)] 97.2 °F (36.2 °C)  Pulse:  [55-71] 71  Resp:  [14-20] 18  SpO2:  [94 %-100 %] 94 %  BP: (108-129)/(52-59) 121/56     Weight: 63.5 kg (140 lb) (12/25/19 1952)  Body mass index is 18.99 kg/m².      Intake/Output Summary (Last 24 hours) at 12/26/2019 0811  Last data filed at 12/25/2019 1400  Gross per 24 hour   Intake --   Output 0 ml   Net 0 ml       Lines/Drains/Airways     Central Venous Catheter Line                 Port A Cath Single Lumen right subclavian -- days          Airway                 Airway - Non-Surgical 12/17/19 1512 Nasal Cannula 8 days                Physical Exam   Constitutional: She is oriented to person, place, and time. No  distress.   HENT:   Head: Normocephalic and atraumatic.   Eyes: No scleral icterus.   Cardiovascular: Normal rate and regular rhythm.   Pulmonary/Chest: Effort normal and breath sounds normal.   Abdominal: Soft. Bowel sounds are normal.   Musculoskeletal: She exhibits no edema.   Neurological: She is alert and oriented to person, place, and time.   Skin: She is not diaphoretic.   Nursing note and vitals reviewed.      Significant Labs:  CBC:   Recent Labs   Lab 12/24/19  1241 12/25/19  0417   WBC 2.59* 3.15*   HGB 12.4 11.6*   HCT 38.7 36.2*    208     CMP:   Recent Labs   Lab 12/25/19 0417   GLU 80   CALCIUM 8.4*   ALBUMIN 2.5*   PROT 5.7*      K 3.6   CO2 25      BUN 9   CREATININE 0.6   ALKPHOS 137*   ALT 14   AST 23   BILITOT 0.9     Coagulation: No results for input(s): PT, INR, APTT in the last 48 hours.      Significant Imaging:  Imaging results within the past 24 hours have been reviewed.

## 2019-12-26 NOTE — NURSING TRANSFER
Nursing Transfer Note      12/26/2019     Transfer room 628    Transfer via stretcher    Transported by PCT    Chart send with patient: YES    Notified: NO FAMILY PRESENT    Patient reassessed at: 1310

## 2019-12-26 NOTE — TELEPHONE ENCOUNTER
12/26/2019  1:33 PM    Patient s/p ERCP on 12/24 and PTC on 12/26  Patient needs repeat ERCP in 6 weeks to possibly internalize drain  Order has been placed and MA jenifferd    Syed Mccarthy M.D.  Gastroenterology Fellow, PGY-VI  Pager: [phone]  [facility]

## 2019-12-26 NOTE — SUBJECTIVE & OBJECTIVE
Interval History: Pain improved today with pain meds. Denies fever, chills, nausea. Awaiting PTC this am.    Review of Systems   Constitutional: Positive for appetite change and fatigue. Negative for fever.   Respiratory: Negative for chest tightness and shortness of breath.    Cardiovascular: Negative for chest pain.   Gastrointestinal: Positive for abdominal pain and nausea. Negative for diarrhea and vomiting.     Objective:     Vital Signs (Most Recent):  Temp: 97.3 °F (36.3 °C) (12/26/19 1405)  Pulse: 61 (12/26/19 1405)  Resp: 18 (12/26/19 1405)  BP: (!) 142/65 (12/26/19 1405)  SpO2: 95 % (12/26/19 1405) Vital Signs (24h Range):  Temp:  [96.6 °F (35.9 °C)-98.2 °F (36.8 °C)] 97.3 °F (36.3 °C)  Pulse:  [55-71] 61  Resp:  [14-20] 18  SpO2:  [94 %-100 %] 95 %  BP: (110-169)/(52-71) 142/65     Weight: 63.5 kg (140 lb)  Body mass index is 18.99 kg/m².    Intake/Output Summary (Last 24 hours) at 12/26/2019 1455  Last data filed at 12/26/2019 1212  Gross per 24 hour   Intake 700 ml   Output --   Net 700 ml      Physical Exam   Constitutional: She is oriented to person, place, and time. She appears well-developed and well-nourished. No distress.   HENT:   Head: Normocephalic and atraumatic.   Nose: Nose normal.   Eyes: Pupils are equal, round, and reactive to light. EOM are normal. No scleral icterus.   Neck: Normal range of motion. No JVD present. No tracheal deviation present.   Cardiovascular: Normal rate, regular rhythm and normal heart sounds.   Pulmonary/Chest: Effort normal and breath sounds normal. No respiratory distress.   Abdominal: Soft. She exhibits no distension. There is tenderness (mild RUQ and epigastric).   Musculoskeletal: She exhibits no edema or deformity.   Neurological: She is alert and oriented to person, place, and time.   Skin: Skin is warm and dry. No rash noted. She is not diaphoretic.   Psychiatric: She has a normal mood and affect. Her behavior is normal.   Vitals reviewed.      MELD-Na  score: 19 at 10/20/2019  5:15 AM  MELD score: 18 at 10/20/2019  5:15 AM  Calculated from:  Serum Creatinine: 0.8 mg/dL (Rounded to 1 mg/dL) at 10/20/2019  5:15 AM  Serum Sodium: 135 mmol/L at 10/20/2019  5:15 AM  Total Bilirubin: 17.9 mg/dL at 10/20/2019  5:15 AM  INR(ratio): 1.1 at 10/18/2019  9:36 PM  Age: 74 years    Significant Labs:  CBC:  Recent Labs   Lab 12/25/19 0417 12/26/19  0520   WBC 3.15* 4.20   HGB 11.6* 10.4*   HCT 36.2* 33.1*    213     CMP:  Recent Labs   Lab 12/25/19 0417 12/26/19  0520    140   K 3.6 3.0*    108   CO2 25 24   GLU 80 48*   BUN 9 11   CREATININE 0.6 0.6   CALCIUM 8.4* 7.3*   PROT 5.7* 4.7*   ALBUMIN 2.5* 2.1*   BILITOT 0.9 0.7   ALKPHOS 137* 98   AST 23 17   ALT 14 10   ANIONGAP 10 8   EGFRNONAA >60.0 >60.0     PTINR:  No results for input(s): INR in the last 48 hours.    Significant Procedures:   Dobutamine Stress Test with Color Flow: No results found for this or any previous visit.    None

## 2019-12-26 NOTE — ANESTHESIA PROCEDURE NOTES
Intubation  Performed by: Maya Hook CRNA  Authorized by: José Miguel Jacome MD     Intubation:     Induction:  Intravenous    Intubated:  Postinduction    Mask Ventilation:  Easy mask    Attempts:  1    Attempted By:  CRNA    Method of Intubation:  Direct    Blade:  Ellis 2    Laryngeal View Grade: Grade I - full view of chords      Difficult Airway Encountered?: No      Complications:  None    Airway Device:  Oral endotracheal tube    Airway Device Size:  7.5    Style/Cuff Inflation:  Cuffed    Inflation Amount (mL):  5    Tube secured:  23    Secured at:  The lips    Placement Verified By:  Capnometry    Complicating Factors:  None    Findings Post-Intubation:  BS equal bilateral

## 2019-12-26 NOTE — PROGRESS NOTES
[facility]  Gastroenterology  Progress Note    Patient Name: Angelica Tomlinson  MRN: [id number]  Admission Date: 12/24/2019  Hospital Length of Stay: 2 days  Code Status: Full Code   Attending Provider: John Eubanks, *  Consulting Provider: Syed Mccarthy MD  Primary Care Physician: Miguel Barroso MD  Principal Problem: Cholangitis      Subjective:     Interval History:   VS stable with no fevers.  Morning labs currently in process.  Patient reports feeling well as she thinks that while being in the hospital her pain has been better controlled.     Review of Systems   Constitutional: Positive for activity change, appetite change, fatigue and unexpected weight change. Negative for chills, diaphoresis and fever.   HENT: Negative for trouble swallowing and voice change.    Respiratory: Negative for cough and shortness of breath.    Cardiovascular: Negative for chest pain and palpitations.   Gastrointestinal: Positive for abdominal pain (improving). Negative for abdominal distention, anal bleeding, blood in stool, constipation, diarrhea, nausea, rectal pain and vomiting.   Genitourinary: Negative for dysuria, frequency, hematuria and urgency.   Musculoskeletal: Negative for back pain and neck pain.   Skin: Negative for color change, pallor, rash and wound.   Neurological: Negative for dizziness, syncope, weakness and light-headedness.     Objective:     Vital Signs (Most Recent):  Temp: 97.2 °F (36.2 °C) (12/26/19 0757)  Pulse: 71 (12/26/19 0757)  Resp: 18 (12/26/19 0757)  BP: (!) 121/56 (12/26/19 0757)  SpO2: (!) 94 % (12/26/19 0757) Vital Signs (24h Range):  Temp:  [96.6 °F (35.9 °C)-98.2 °F (36.8 °C)] 97.2 °F (36.2 °C)  Pulse:  [55-71] 71  Resp:  [14-20] 18  SpO2:  [94 %-100 %] 94 %  BP: (108-129)/(52-59) 121/56     Weight: 63.5 kg (140 lb) (12/25/19 1952)  Body mass index is 18.99 kg/m².      Intake/Output Summary (Last 24 hours) at 12/26/2019 0811  Last data filed at 12/25/2019  1400  Gross per 24 hour   Intake --   Output 0 ml   Net 0 ml       Lines/Drains/Airways     Central Venous Catheter Line                 Port A Cath Single Lumen right subclavian -- days          Airway                 Airway - Non-Surgical 12/17/19 1512 Nasal Cannula 8 days                Physical Exam   Constitutional: She is oriented to person, place, and time. No distress.   HENT:   Head: Normocephalic and atraumatic.   Eyes: No scleral icterus.   Cardiovascular: Normal rate and regular rhythm.   Pulmonary/Chest: Effort normal and breath sounds normal.   Abdominal: Soft. Bowel sounds are normal.   Musculoskeletal: She exhibits no edema.   Neurological: She is alert and oriented to person, place, and time.   Skin: She is not diaphoretic.   Nursing note and vitals reviewed.      Significant Labs:  CBC:   Recent Labs   Lab 12/24/19  1241 12/25/19  0417   WBC 2.59* 3.15*   HGB 12.4 11.6*   HCT 38.7 36.2*    208     CMP:   Recent Labs   Lab 12/25/19  0417   GLU 80   CALCIUM 8.4*   ALBUMIN 2.5*   PROT 5.7*      K 3.6   CO2 25      BUN 9   CREATININE 0.6   ALKPHOS 137*   ALT 14   AST 23   BILITOT 0.9     Coagulation: No results for input(s): PT, INR, APTT in the last 48 hours.      Significant Imaging:  Imaging results within the past 24 hours have been reviewed.    Assessment/Plan:     Biliary obstruction  74 year old female with a history of GB Cancer undergoing palliative chemotherapy (follows at [facility]) admitted after her ERCP on 12/24/19 due to concern for cholangitis and inability to appropriately drain the biliary tree endoscopically. Patient has remained stable on antibiotics and is scheduled for PTC by IR today. In approximately 6 weeks we can attempt repeat outpatient ERCP in hopes of internalizing her drain.     Recommendations:  --NPO for PTC by IR today (after the procedure if she has completed 2 days of liquids can advance slowly to a soft mechanical diet)  --Daily CMP and CBC  --Continue  antibiotics (do recommend completing a course of antibiotics for cholangitis)  --Will attempt to internalize drain in 6 weeks, will follow PTC today and based on outcome will work on scheduling outpatient ERCP        Thank you for your consult. I will follow-up with patient. Please contact us if you have any additional questions.    Syed Mccarthy M.D.  Gastroenterology Fellow, PGY-VI  Pager: [phone]  [facility]

## 2019-12-27 VITALS
SYSTOLIC BLOOD PRESSURE: 153 MMHG | WEIGHT: 140 LBS | HEIGHT: 72 IN | TEMPERATURE: 98 F | OXYGEN SATURATION: 98 % | RESPIRATION RATE: 18 BRPM | HEART RATE: 64 BPM | BODY MASS INDEX: 18.96 KG/M2 | DIASTOLIC BLOOD PRESSURE: 69 MMHG

## 2019-12-27 PROBLEM — E87.6 HYPOKALEMIA: Status: RESOLVED | Noted: 2019-12-25 | Resolved: 2019-12-27

## 2019-12-27 LAB
ALBUMIN SERPL BCP-MCNC: 2.5 G/DL (ref 3.5–5.2)
ALP SERPL-CCNC: 117 U/L (ref 55–135)
ALT SERPL W/O P-5'-P-CCNC: 30 U/L (ref 10–44)
ANION GAP SERPL CALC-SCNC: 6 MMOL/L (ref 8–16)
ANISOCYTOSIS BLD QL SMEAR: SLIGHT
AST SERPL-CCNC: 56 U/L (ref 10–40)
BASOPHILS # BLD AUTO: 0.01 K/UL (ref 0–0.2)
BASOPHILS NFR BLD: 0.1 % (ref 0–1.9)
BILIRUB SERPL-MCNC: 0.8 MG/DL (ref 0.1–1)
BUN SERPL-MCNC: 11 MG/DL (ref 8–23)
CALCIUM SERPL-MCNC: 8.5 MG/DL (ref 8.7–10.5)
CHLORIDE SERPL-SCNC: 102 MMOL/L (ref 95–110)
CO2 SERPL-SCNC: 26 MMOL/L (ref 23–29)
CREAT SERPL-MCNC: 0.7 MG/DL (ref 0.5–1.4)
DIFFERENTIAL METHOD: ABNORMAL
EOSINOPHIL # BLD AUTO: 0 K/UL (ref 0–0.5)
EOSINOPHIL NFR BLD: 0 % (ref 0–8)
ERYTHROCYTE [DISTWIDTH] IN BLOOD BY AUTOMATED COUNT: 15 % (ref 11.5–14.5)
EST. GFR  (AFRICAN AMERICAN): >60 ML/MIN/1.73 M^2
EST. GFR  (NON AFRICAN AMERICAN): >60 ML/MIN/1.73 M^2
GLUCOSE SERPL-MCNC: 115 MG/DL (ref 70–110)
HCT VFR BLD AUTO: 32.4 % (ref 37–48.5)
HGB BLD-MCNC: 10.6 G/DL (ref 12–16)
HYPOCHROMIA BLD QL SMEAR: ABNORMAL
IMM GRANULOCYTES # BLD AUTO: 0.11 K/UL (ref 0–0.04)
IMM GRANULOCYTES NFR BLD AUTO: 1.3 % (ref 0–0.5)
LYMPHOCYTES # BLD AUTO: 1.1 K/UL (ref 1–4.8)
LYMPHOCYTES NFR BLD: 12.9 % (ref 18–48)
MCH RBC QN AUTO: 30.2 PG (ref 27–31)
MCHC RBC AUTO-ENTMCNC: 32.7 G/DL (ref 32–36)
MCV RBC AUTO: 92 FL (ref 82–98)
MONOCYTES # BLD AUTO: 1.6 K/UL (ref 0.3–1)
MONOCYTES NFR BLD: 18.1 % (ref 4–15)
NEUTROPHILS # BLD AUTO: 5.9 K/UL (ref 1.8–7.7)
NEUTROPHILS NFR BLD: 67.6 % (ref 38–73)
NRBC BLD-RTO: 0 /100 WBC
OVALOCYTES BLD QL SMEAR: ABNORMAL
PLATELET # BLD AUTO: 219 K/UL (ref 150–350)
PLATELET BLD QL SMEAR: ABNORMAL
PMV BLD AUTO: 9 FL (ref 9.2–12.9)
POIKILOCYTOSIS BLD QL SMEAR: SLIGHT
POTASSIUM SERPL-SCNC: 4.7 MMOL/L (ref 3.5–5.1)
PROT SERPL-MCNC: 5.6 G/DL (ref 6–8.4)
RBC # BLD AUTO: 3.51 M/UL (ref 4–5.4)
SODIUM SERPL-SCNC: 134 MMOL/L (ref 136–145)
WBC # BLD AUTO: 8.71 K/UL (ref 3.9–12.7)

## 2019-12-27 PROCEDURE — 80053 COMPREHEN METABOLIC PANEL: CPT | Mod: HCNC

## 2019-12-27 PROCEDURE — 25000003 PHARM REV CODE 250: Mod: HCNC | Performed by: HOSPITALIST

## 2019-12-27 PROCEDURE — 85025 COMPLETE CBC W/AUTO DIFF WBC: CPT | Mod: HCNC

## 2019-12-27 PROCEDURE — 99239 HOSP IP/OBS DSCHRG MGMT >30: CPT | Mod: HCNC,,, | Performed by: HOSPITALIST

## 2019-12-27 PROCEDURE — S0030 INJECTION, METRONIDAZOLE: HCPCS | Mod: HCNC | Performed by: HOSPITALIST

## 2019-12-27 PROCEDURE — 99239 PR HOSPITAL DISCHARGE DAY,>30 MIN: ICD-10-PCS | Mod: HCNC,,, | Performed by: HOSPITALIST

## 2019-12-27 PROCEDURE — 63600175 PHARM REV CODE 636 W HCPCS: Mod: HCNC | Performed by: HOSPITALIST

## 2019-12-27 RX ORDER — HEPARIN 100 UNIT/ML
5 SYRINGE INTRAVENOUS ONCE
Status: COMPLETED | OUTPATIENT
Start: 2019-12-27 | End: 2019-12-27

## 2019-12-27 RX ORDER — AMOXICILLIN AND CLAVULANATE POTASSIUM 875; 125 MG/1; MG/1
1 TABLET, FILM COATED ORAL 2 TIMES DAILY
Qty: 14 TABLET | Refills: 0 | Status: SHIPPED | OUTPATIENT
Start: 2019-12-27 | End: 2020-01-03

## 2019-12-27 RX ORDER — OXYCODONE AND ACETAMINOPHEN 5; 325 MG/1; MG/1
1 TABLET ORAL EVERY 4 HOURS PRN
Qty: 15 TABLET | Refills: 0 | Status: SHIPPED | OUTPATIENT
Start: 2019-12-27 | End: 2020-01-01

## 2019-12-27 RX ADMIN — OXYCODONE HYDROCHLORIDE 5 MG: 5 SOLUTION ORAL at 10:12

## 2019-12-27 RX ADMIN — CEFEPIME 2 G: 2 INJECTION, POWDER, FOR SOLUTION INTRAVENOUS at 01:12

## 2019-12-27 RX ADMIN — MELATONIN 1000 UNITS: at 09:12

## 2019-12-27 RX ADMIN — METRONIDAZOLE 500 MG: 500 INJECTION, SOLUTION INTRAVENOUS at 05:12

## 2019-12-27 RX ADMIN — POLYETHYLENE GLYCOL 3350 17 G: 17 POWDER, FOR SOLUTION ORAL at 09:12

## 2019-12-27 RX ADMIN — HEPARIN 500 UNITS: 100 SYRINGE at 01:12

## 2019-12-27 RX ADMIN — HYDROMORPHONE HYDROCHLORIDE 1 MG: 1 INJECTION, SOLUTION INTRAMUSCULAR; INTRAVENOUS; SUBCUTANEOUS at 01:12

## 2019-12-27 RX ADMIN — CEFEPIME 2 G: 2 INJECTION, POWDER, FOR SOLUTION INTRAVENOUS at 10:12

## 2019-12-27 RX ADMIN — AMLODIPINE BESYLATE 10 MG: 10 TABLET ORAL at 09:12

## 2019-12-27 RX ADMIN — PANTOPRAZOLE SODIUM 40 MG: 40 TABLET, DELAYED RELEASE ORAL at 09:12

## 2019-12-27 NOTE — PROGRESS NOTES
Patient received discharge instructions and prescriptions. Patient verbalized understanding. Port heparin locked and deaccessed. Patient in room waiting for transport. Will continue to monitor.

## 2019-12-27 NOTE — ASSESSMENT & PLAN NOTE
- IR consulted for PTC, successfully performed on 12/26  - may attempt internalization in 6 weeks with repeat ERCP ([facility] has scheduled)

## 2019-12-27 NOTE — ASSESSMENT & PLAN NOTE
- s/p stent placement  - clear liquid diet x2 days, then advanced to mechanical soft which was well tolerated

## 2019-12-27 NOTE — ASSESSMENT & PLAN NOTE
- visualized purulence upon removal of stent  - does not appear septic  - initiated on cefepime/flagyl while in house, de-escalated to augmentin x7d upon discharge

## 2019-12-27 NOTE — ASSESSMENT & PLAN NOTE
- recurrent gallbladder cancer on palliative chemo with mFOLFOX6 at [facility]  - will f/u with outpatient Oncologist upon discharge

## 2019-12-27 NOTE — ASSESSMENT & PLAN NOTE
- post-ERCP pain  - lipase <3xULN  - suspect 2/2 duodenal stenting/cholangitis/PTC  - oxycodone 5mg prn, will give short script on discharge

## 2019-12-27 NOTE — DISCHARGE SUMMARY
[facility] Hospital Medicine  Discharge Summary      Patient Name: Angelica Tomlinson  MRN: [id number]  Admission Date: 12/24/2019  Hospital Length of Stay: 3 days  Discharge Date and Time: No discharge date for patient encounter.  Attending Physician: John Eubanks, *   Discharging Provider: John Eubanks MD  Primary Care Provider: Miguel Barroso MD  [facility] Medicine Team: [facility] HOSP MED A John Eubanks MD    HPI:   Ms. 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 During procedure, duodenal stent placed without complication. One biliary stent exchanged and with mild purulence observed upon exchange. Second stent was unable to be exchanged due to severe ulceration around area. Following procedure, she had nausea and abdominal pain, which has since resolved with antiemetic/pain medication. She otherwise denies any fevers, chills, chest pain, shortness of breath, diarrhea, lightheadedness, dizziness.    [facility] requests admission for IV antibiotics for mild cholangitis and IR consultation for consideration of PTC.    Procedure(s) (LRB):  CHOLANGIOGRAM (N/A)      Hospital Course:   Ms. Tomlinson was admitted from [facility] service due to post-ERCP pain and evidence of cholangitis following stent exchange. Started on cefepime/flagyl for cholangitis while in house, but was never septic. IR consulted for PTC, which was placed on 12/26. Procedure well tolerated. Will discharge on augmentin to complete 7d course per [facility] recs. She will need f/u ERCP in 6 weeks. Problem based discussion below.    Vitals:    12/27/19 1149   BP: (!) 153/69   Pulse: 64   Resp: 18   Temp: 97.9 °F (36.6 °C)     Physical Exam   Constitutional: She is oriented to person, place, and time. She appears  well-developed and well-nourished. No distress.   HENT:   Head: Normocephalic and atraumatic.   Nose: Nose normal.   Eyes: Pupils are equal, round, and reactive to light. EOM are normal. No scleral icterus.   Neck: Normal range of motion. No JVD present. No tracheal deviation present.   Cardiovascular: Normal rate, regular rhythm and normal heart sounds.   Pulmonary/Chest: Effort normal and breath sounds normal. No respiratory distress.   Abdominal: Soft. She exhibits no distension. There is tenderness (mild RUQ and epigastric).   Musculoskeletal: She exhibits no edema or deformity.   Neurological: She is alert and oriented to person, place, and time.   Skin: Skin is warm and dry. No rash noted. She is not diaphoretic.   Psychiatric: She has a normal mood and affect. Her behavior is normal.   Vitals reviewed.      Consults:   Consults (From admission, onward)        Status Ordering Provider     Inpatient consult to Interventional Radiology  Once     Provider:  (Not yet assigned)    Completed ROBIN BARROS          * Cholangitis  - visualized purulence upon removal of stent  - does not appear septic  - initiated on cefepime/flagyl while in house, de-escalated to augmentin x7d upon discharge      Pain  - post-ERCP pain  - lipase <3xULN  - suspect 2/2 duodenal stenting/cholangitis/PTC  - oxycodone 5mg prn, will give short script on discharge      Duodenal stenosis  - s/p stent placement  - clear liquid diet x2 days, then advanced to mechanical soft which was well tolerated      Biliary stricture  - IR consulted for PTC, successfully performed on 12/26  - may attempt internalization in 6 weeks with repeat ERCP ([facility] has scheduled)      Biliary obstruction  - see above      HTN (hypertension)  - controlled  - continue home amlodipine 10mg    Gallbladder cancer  - recurrent gallbladder cancer on palliative chemo with mFOLFOX6 at [facility]  - will f/u with outpatient Oncologist upon discharge      Hypokalemia-resolved  as of 12/27/2019  - replete prn  - check Mg    Final Active Diagnoses:    Diagnosis Date Noted POA    PRINCIPAL PROBLEM:  Cholangitis [K83.09] 12/24/2019 Yes    Pain [R52] 12/25/2019 Yes    Duodenal stenosis [K31.5] 12/24/2019 Yes    Biliary stricture [K83.1] 12/17/2019 Yes    Biliary obstruction [K83.1] 10/25/2019 Yes    HTN (hypertension) [I10] 10/18/2019 Yes    Gallbladder cancer [C23] 10/09/2019 Yes      Problems Resolved During this Admission:    Diagnosis Date Noted Date Resolved POA    Hypokalemia [E87.6] 12/25/2019 12/27/2019 Yes       Discharged Condition: good    Disposition: Home or Self Care    Follow Up:    Patient Instructions:      Diet general   Order Comments: Mechanical Soft     Notify your health care provider if you experience any of the following:  severe uncontrolled pain     Notify your health care provider if you experience any of the following:  temperature >100.4     Activity as tolerated       Significant Diagnostic Studies: Labs:   CMP   Recent Labs   Lab 12/26/19 0520 12/27/19  0615    134*   K 3.0* 4.7    102   CO2 24 26   GLU 48* 115*   BUN 11 11   CREATININE 0.6 0.7   CALCIUM 7.3* 8.5*   PROT 4.7* 5.6*   ALBUMIN 2.1* 2.5*   BILITOT 0.7 0.8   ALKPHOS 98 117   AST 17 56*   ALT 10 30   ANIONGAP 8 6*   ESTGFRAFRICA >60.0 >60.0   EGFRNONAA >60.0 >60.0   , CBC   Recent Labs   Lab 12/26/19 0520 12/27/19  0615   WBC 4.20 8.71   HGB 10.4* 10.6*   HCT 33.1* 32.4*    219   , INR   Lab Results   Component Value Date    INR 1.1 10/18/2019   , Lipid Panel   Lab Results   Component Value Date    CHOL 282 (H) 10/09/2019    HDL 49 10/09/2019    LDLCALC 210.8 (H) 10/09/2019    TRIG 111 10/09/2019    CHOLHDL 17.4 (L) 10/09/2019   , Troponin No results for input(s): TROPONINI in the last 168 hours., A1C:   Recent Labs   Lab 10/09/19  1046   HGBA1C 4.8    and All labs within the past 24 hours have been reviewed  Radiology: CT scan: CT ABDOMEN PELVIS WITH CONTRAST: No results  found for this visit on 12/24/19. and CT ABDOMEN PELVIS WITHOUT CONTRAST: No results found for this visit on 12/24/19.  Endoscopy: ERCP:   Impression:           - Normal esophagus.                        - Normal stomach.                        - Acquired duodenal stenosis.                        - Duodenal stent placed.                        - One stent was removed from the biliary tree.                         Minimal purulence seen when removing the stent.                        - The other stent could not be safely removed                         because of severely ulcerated area around the                         stent. Cannulation failed through the duodenal                         stent.  Recommendation:       - Admit the patient to hospital [facility] for ongoing                         care.                        - Clear liquid diet for 2 days.                        - Continue present medications.                        - Will need a PTC, will likely be able to                         internalize in the future through the duodenal                         stent.                        - Continue antibiotics for mild cholangitis.    Pending Diagnostic Studies:     Procedure Component Value Units Date/Time    FL ERCP Biliary And Pancreatic [[id number]] Resulted:  12/24/19 1035    Order Status:  Sent Lab Status:  In process Updated:  12/24/19 1035    IR Biliary Transhepatic Cholangiogram [[id number]] Resulted:  12/26/19 0956    Order Status:  Sent Lab Status:  In process Updated:  12/26/19 1208         Medications:  Reconciled Home Medications:      Medication List      START taking these medications    amoxicillin-clavulanate 875-125mg 875-125 mg per tablet  Commonly known as:  AUGMENTIN  Take 1 tablet by mouth 2 (two) times daily. for 7 days     oxyCODONE-acetaminophen 5-325 mg per tablet  Commonly known as:  PERCOCET  Take 1 tablet by mouth every 4 (four) hours as needed for Pain.        CONTINUE taking these  medications    amLODIPine 10 MG tablet  Commonly known as:  NORVASC  Take 1 tablet (10 mg total) by mouth once daily.     aspirin 81 MG Chew     cholestyramine 4 gram packet  Commonly known as:  QUESTRAN  Mix 1 packet (4 g total) and take by mouth 2 (two) times daily. for 10 days     Miralax 17 gram Pwpk  Generic drug:  polyethylene glycol  Take by mouth.     pantoprazole 40 MG tablet  Commonly known as:  PROTONIX  Take 1 tablet (40 mg total) by mouth once daily.     vitamin D 1000 units Tab  Commonly known as:  VITAMIN D3  Take 185 mg by mouth once daily.        STOP taking these medications    lisinopril 20 MG tablet  Commonly known as:  PRINIVIL,ZESTRIL            Indwelling Lines/Drains at time of discharge:   Lines/Drains/Airways     Drain                 Drain/Device  -- days         Biliary Tube 12/26/19 1202 10 Fr. RUQ 1 day                Time spent on the discharge of patient: 35 minutes  Patient was seen and examined on the date of discharge and determined to be suitable for discharge.         John Eubanks MD  Department of Hospital Medicine  [facility]

## 2019-12-28 NOTE — ANESTHESIA POSTPROCEDURE EVALUATION
Anesthesia Post Evaluation    Patient: Angelica Tomlinson    Procedure(s) Performed: Procedure(s) (LRB):  CHOLANGIOGRAM (N/A)    Final Anesthesia Type: general    Patient location during evaluation: PACU  Patient participation: Yes- Able to Participate  Level of consciousness: awake and alert and oriented  Post-procedure vital signs: reviewed and stable  Pain management: adequate  Airway patency: patent    PONV status at discharge: No PONV  Anesthetic complications: no      Cardiovascular status: blood pressure returned to baseline and hemodynamically stable  Respiratory status: unassisted  Hydration status: euvolemic  Follow-up not needed.          Vitals Value Taken Time   /69 12/27/2019 11:49 AM   Temp 36.6 °C (97.9 °F) 12/27/2019 11:49 AM   Pulse 64 12/27/2019 11:49 AM   Resp 18 12/27/2019 11:49 AM   SpO2 98 % 12/27/2019 11:49 AM         Event Time     Out of Recovery 12/26/2019 13:50:00          Pain/Mine Score: Pain Rating Prior to Med Admin: 6 (12/27/2019  1:10 PM)  Pain Rating Post Med Admin: 4 (12/27/2019  1:40 PM)

## 2019-12-31 ENCOUNTER — PATIENT OUTREACH (OUTPATIENT)
Dept: ADMINISTRATIVE | Facility: CLINIC | Age: 74
End: 2019-12-31

## 2019-12-31 NOTE — PROGRESS NOTES
Patient appointment for Hospital Follow Up is scheduled for Wednesday 01/08/2020 for 2:20PM with Dr. WILMA Barroso.

## 2019-12-31 NOTE — PROGRESS NOTES
Spoke with patient to complete TCC call. During call, patient states of continuing fatigue, weakness, decreased appetite, abdominal pain experiencing same as during hospital stay, and constipation. Informed patient that will notify [facility] providers to ask for recommendations as needed. Also advised patient to contact [facility] provider, Dr. Raygoza, for support and advise, also given O[facility] # for any worsening s/s or any additional questions.  Patient verbalized understanding.     Alexx Oconnor RN

## 2019-12-31 NOTE — PATIENT INSTRUCTIONS
Discharge Instructions for ERCP (Endoscopic Retrograde Cholangiopancreatography)  You had a procedure known as an ERCP. Your healthcare provider performed the ERCP to look at your bile or pancreatic ducts, and to locate and treat blockages in the ducts. This procedure is used to diagnose diseases of the pancreas, bile ducts, and pancreatic duct, liver, and gallbladder. Heres what you need to do following your ERCP.  Home care  · Dont take aspirin or any other blood-thinning medicines (anticoagulants) until your provider says its OK.  · Your provider may prescribe an antibiotic, depending on what was done during the ERCP.  · You may have a sore throat for 1 to 2 days after the procedure. Use lozenges or gargle with salt water for your sore throat. If you're not better in a few days, call your provider.  · Rest, drink fluids, and eat light meals. If you feel bloated or have too much gas, use a heating pad on your belly to help reduce the discomfort. This should help you feel better. But if it doesn't, call your provider.  · Dont drink alcohol for 2 days after the procedure.  Follow-up care  Make a follow-up appointment as directed by our staff.     When to seek medical care  Call your provider right away if you have any of the following:  · Trouble swallowing or throat pain that gets worse   · Chest pain or severe belly or abdominal pain  · Fever above 100°F (37.7°C) or chills  · Upset stomach (nausea) and vomiting  · Black or tarry stools     © 4563-2721 [employer]. [address] [place] [address], [place] [address]. All rights reserved. This information is not intended as a substitute for professional medical care. Always follow your healthcare professional's instructions.

## 2020-01-08 ENCOUNTER — OFFICE VISIT (OUTPATIENT)
Dept: FAMILY MEDICINE | Facility: CLINIC | Age: 75
End: 2020-01-08
Payer: MEDICARE

## 2020-01-08 VITALS
HEART RATE: 88 BPM | SYSTOLIC BLOOD PRESSURE: 102 MMHG | BODY MASS INDEX: 18.48 KG/M2 | HEIGHT: 72 IN | DIASTOLIC BLOOD PRESSURE: 52 MMHG | TEMPERATURE: 98 F | OXYGEN SATURATION: 97 % | WEIGHT: 136.44 LBS

## 2020-01-08 DIAGNOSIS — C23 MALIGNANT NEOPLASM OF GALLBLADDER: ICD-10-CM

## 2020-01-08 DIAGNOSIS — I10 ESSENTIAL HYPERTENSION: Primary | ICD-10-CM

## 2020-01-08 PROBLEM — R17 JAUNDICE: Status: RESOLVED | Noted: 2019-10-18 | Resolved: 2020-01-08

## 2020-01-08 PROBLEM — R74.01 TRANSAMINITIS: Status: RESOLVED | Noted: 2019-10-18 | Resolved: 2020-01-08

## 2020-01-08 PROCEDURE — 99495 TCM SERVICES (MODERATE COMPLEXITY): ICD-10-PCS | Mod: HCNC,S$GLB,, | Performed by: FAMILY MEDICINE

## 2020-01-08 PROCEDURE — 99999 PR PBB SHADOW E&M-EST. PATIENT-LVL III: CPT | Mod: PBBFAC,HCNC,, | Performed by: FAMILY MEDICINE

## 2020-01-08 PROCEDURE — 99499 RISK ADDL DX/OHS AUDIT: ICD-10-PCS | Mod: S$GLB,,, | Performed by: FAMILY MEDICINE

## 2020-01-08 PROCEDURE — 99999 PR PBB SHADOW E&M-EST. PATIENT-LVL III: ICD-10-PCS | Mod: PBBFAC,HCNC,, | Performed by: FAMILY MEDICINE

## 2020-01-08 PROCEDURE — 99499 UNLISTED E&M SERVICE: CPT | Mod: S$GLB,,, | Performed by: FAMILY MEDICINE

## 2020-01-08 PROCEDURE — 99495 TRANSJ CARE MGMT MOD F2F 14D: CPT | Mod: HCNC,S$GLB,, | Performed by: FAMILY MEDICINE

## 2020-01-08 RX ORDER — ONDANSETRON 8 MG/1
8 TABLET, ORALLY DISINTEGRATING ORAL DAILY PRN
COMMUNITY
Start: 2019-11-05 | End: 2020-11-04

## 2020-01-08 RX ORDER — OMEPRAZOLE 20 MG/1
20 CAPSULE, DELAYED RELEASE ORAL DAILY PRN
COMMUNITY
Start: 2019-10-10 | End: 2020-03-18 | Stop reason: SDUPTHER

## 2020-01-08 RX ORDER — FENTANYL 25 UG/1
1 PATCH TRANSDERMAL
COMMUNITY
Start: 2020-01-07 | End: 2020-02-06

## 2020-01-08 RX ORDER — OXYCODONE HYDROCHLORIDE 15 MG/1
15 TABLET ORAL
COMMUNITY
Start: 2020-01-07 | End: 2020-02-06

## 2020-01-08 RX ORDER — DEXAMETHASONE 4 MG/1
TABLET ORAL
Status: ON HOLD | COMMUNITY
Start: 2019-11-05 | End: 2020-02-26 | Stop reason: HOSPADM

## 2020-01-08 RX ORDER — AMMONIUM LACTATE 12 G/100G
CREAM TOPICAL
Refills: 10 | Status: ON HOLD | COMMUNITY
Start: 2019-11-06 | End: 2020-02-24

## 2020-01-08 NOTE — PROGRESS NOTES
[facility]  Progress Note    SUBJECTIVE:     Chief Complaint   Patient presents with    Follow-up     hospital follow up. hospitalized from 12/24-12/27 or 12/28       [facility]   Angelica Tomlinson  is a 74 y.o. female here for hospital follow-up for cholangitis. Has known advanced gallbladder cancer. Had stents changed, and started on PO augmentin which she has finished. Feeling better. Pain controlled on fentanyl but does have break through pain. She was unsure if she could take oxycodone on top of that.     Review of patient's allergies indicates:  No Known Allergies    Past Medical History:   Diagnosis Date    Biliary obstruction     Cancer     Gallbladder    Cholangiocarcinoma     Constipation     Cyst of breast, left, benign solitary     Diverticulosis     Duodenal mass 5/16/2016    Duodenal stenosis     Helicobacter pylori gastritis     Hiatal hernia     Hx of colonic polyp     Tobacco abuse 10/9/2019     Past Surgical History:   Procedure Laterality Date    APPENDECTOMY      CHOLANGIOGRAM N/A 12/26/2019    Procedure: CHOLANGIOGRAM;  Surgeon: Michelle Surgeon;  Location: [facility];  Service: Anesthesiology;  Laterality: N/A;    colon polyps      COLONOSCOPY      ENDOSCOPIC ULTRASOUND OF UPPER GASTROINTESTINAL TRACT N/A 10/25/2019    Procedure: ULTRASOUND, UPPER GI TRACT, ENDOSCOPIC;  Surgeon: Luis Antonio Lu MD;  Location: [facility];  Service: Endoscopy;  Laterality: N/A;    ERCP N/A 10/21/2019    Procedure: ERCP (ENDOSCOPIC RETROGRADE CHOLANGIOPANCREATOGRAPHY);  Surgeon: Luis Antonio Lu MD;  Location: [facility];  Service: Endoscopy;  Laterality: N/A;    ERCP N/A 12/17/2019    Procedure: ERCP (ENDOSCOPIC RETROGRADE CHOLANGIOPANCREATOGRAPHY);  Surgeon: Monse aRyo MD;  Location: [facility];  Service: Endoscopy;  Laterality: N/A;    ERCP N/A 12/24/2019    Procedure: ERCP (ENDOSCOPIC RETROGRADE CHOLANGIOPANCREATOGRAPHY);  Surgeon: Monse Rayo MD;  Location: [facility] ([facility]);   Service: Endoscopy;  Laterality: N/A;  Will need duodenal stent as well as permanent biliary stents (likely 6 x 8 and 6 x 10 epic stents).  Dr Arnav Rayo    ERCP W/ PLASTIC STENT PLACEMENT  10/21/2019    ESOPHAGOGASTRODUODENOSCOPY N/A 10/25/2019    Procedure: EGD (ESOPHAGOGASTRODUODENOSCOPY);  Surgeon: Luis Antonio Lu MD;  Location: [facility];  Service: Endoscopy;  Laterality: N/A;     Family History   Problem Relation Age of Onset    Pneumonia Mother      Social History     Tobacco Use    Smoking status: Former Smoker     Packs/day: 0.50     Years: 42.00     Pack years: 21.00     Types: Cigarettes     Start date: 1/1/1977    Smokeless tobacco: Never Used    Tobacco comment: Pt states that she no longer smokes regularly- only occasionally.   Substance Use Topics    Alcohol use: No    Drug use: No        Review of Systems   Constitutional: Negative for chills, fever and malaise/fatigue.   HENT: Negative.    Respiratory: Negative.  Negative for cough and shortness of breath.    Cardiovascular: Negative.  Negative for chest pain.   Gastrointestinal: Negative for abdominal pain, nausea and vomiting.        + light stools   Genitourinary: Negative.    Neurological: Negative for weakness and headaches.   All other systems reviewed and are negative.    OBJECTIVE:     Vitals:    01/08/20 1422   BP: (!) 102/52   Pulse: 88   Temp: 97.9 °F (36.6 °C)     Body mass index is 18.51 kg/m².    Physical Exam   Constitutional: She is oriented to person, place, and time and well-developed, well-nourished, and in no distress. No distress.   HENT:   Head: Normocephalic and atraumatic.   Nose: Nose normal.   + jaundice   Eyes: Conjunctivae and EOM are normal. Scleral icterus is present.   Neck: No thyromegaly present.   Cardiovascular: Normal rate, regular rhythm and normal heart sounds. Exam reveals no gallop and no friction rub.   No murmur heard.  Pulmonary/Chest: Effort normal and breath sounds normal. No respiratory distress.  She has no wheezes. She has no rales. She exhibits no tenderness.   Abdominal: Soft. Bowel sounds are normal. She exhibits no distension. There is no tenderness. There is no rebound.   + biliary bag in place   Musculoskeletal: She exhibits no edema.   Lymphadenopathy:     She has no cervical adenopathy.   Neurological: She is alert and oriented to person, place, and time.   Skin: Skin is warm. No rash noted. She is not diaphoretic.     Transitional Care Note    Family and/or Caretaker present at visit?  Yes.  Diagnostic tests reviewed/disposition: I have reviewed all completed as well as pending diagnostic tests at the time of discharge.  Disease/illness education: yes  Home health/community services discussion/referrals: Patient does not have home health established from hospital visit.  They do not need home health.  If needed, we will set up home health for the patient.   Establishment or re-establishment of referral orders for community resources: No other necessary community resources.   Discussion with other health care providers: No discussion with other health care providers necessary.           Old records were reviewed. Labs and/or images were independently reviewed.    ASSESSMENT     1. Essential hypertension    2. Malignant neoplasm of gallbladder        PLAN:     Essential hypertension   -      Hold amlodipine for now due to bp. Check twice a day and keep a log. Advised to drink more water.     Malignant neoplasm of gallbladder   -      F/u with oncology. Take fentanyl, ok with oxycodone for breakthrough pain. Titrate up as needed.    RTC PRN    Miguel Barroso MD  01/08/2020 2:54 PM

## 2020-01-16 ENCOUNTER — TELEPHONE (OUTPATIENT)
Dept: ENDOSCOPY | Facility: HOSPITAL | Age: 75
End: 2020-01-16

## 2020-01-16 NOTE — TELEPHONE ENCOUNTER
----- Message from Monse Rayo MD sent at 1/13/2020  2:55 PM CST -----  No. IR can attempt internalization in the future.     ----- Message -----  From: Keiko Lewis MA  Sent: 1/13/2020  12:02 PM CST  To: Monse Rayo MD    Does she trudy an ERCP?

## 2020-01-23 ENCOUNTER — TELEPHONE (OUTPATIENT)
Dept: ENDOSCOPY | Facility: HOSPITAL | Age: 75
End: 2020-01-23

## 2020-01-23 NOTE — TELEPHONE ENCOUNTER
----- Message from Zoila Sharma sent at 1/23/2020 10:30 AM CST -----  Contact: pt: [phone]  Pt calling to speak with Shi       Please contact pt: [phone]

## 2020-02-03 ENCOUNTER — TELEPHONE (OUTPATIENT)
Dept: ENDOSCOPY | Facility: HOSPITAL | Age: 75
End: 2020-02-03

## 2020-02-03 DIAGNOSIS — K83.1 BILIARY OBSTRUCTION: Primary | ICD-10-CM

## 2020-02-03 NOTE — TELEPHONE ENCOUNTER
----- Message from Cheryl Helms MA sent at 2/3/2020 11:17 AM CST -----  Contact: pt [phone]      ----- Message -----  From: Ashleigh Garcia RN  Sent: 2/3/2020  11:12 AM CST  To: Cheryl Helsm MA    I believe this is for Dr. Banks  ----- Message -----  From: Leyda Ghotra  Sent: 2/3/2020  11:03 AM CST  To: [facility] Clinical Staff    Pt has concerns about procedure she had on 12/25.

## 2020-02-06 ENCOUNTER — NURSE TRIAGE (OUTPATIENT)
Dept: ADMINISTRATIVE | Facility: CLINIC | Age: 75
End: 2020-02-06

## 2020-02-06 NOTE — TELEPHONE ENCOUNTER
Spoke with pt:      Needs reversal per Dr Eubanks in 4- 6 weeks of drain placement done 12/26/19. Saw Dr Moulton 12/27/2020. And instructed pt of this. Should get a call between 4-6 weeks. And no call back yet.     The site where the bag is that is draining is painful. No fever. Cannot see insertion site to see if infection s/s. Pain no different than her last MDV.    instructed pt I would forward message /concern.     Reason for Disposition   [1] Caller requesting NON-URGENT health information AND [2] PCP's office is the best resource    Additional Information   Negative: RN needs further essential information from caller in order to complete triage   Negative: Requesting regular office appointment    Protocols used: INFORMATION ONLY CALL-A-

## 2020-02-10 ENCOUNTER — TELEPHONE (OUTPATIENT)
Dept: GASTROENTEROLOGY | Facility: CLINIC | Age: 75
End: 2020-02-10

## 2020-02-10 ENCOUNTER — TELEPHONE (OUTPATIENT)
Dept: FAMILY MEDICINE | Facility: CLINIC | Age: 75
End: 2020-02-10

## 2020-02-10 ENCOUNTER — TELEPHONE (OUTPATIENT)
Dept: ENDOSCOPY | Facility: HOSPITAL | Age: 75
End: 2020-02-10

## 2020-02-10 ENCOUNTER — TELEPHONE (OUTPATIENT)
Dept: INTERVENTIONAL RADIOLOGY/VASCULAR | Facility: HOSPITAL | Age: 75
End: 2020-02-10

## 2020-02-10 DIAGNOSIS — K83.1 BILIARY TRACT OBSTRUCTION: Primary | ICD-10-CM

## 2020-02-10 NOTE — TELEPHONE ENCOUNTER
----- Message from Norma Vera sent at 2/10/2020  3:13 PM CST -----  Contact: Self [phone] or [phone]  Patient would like to speak with you about having a f/u appointment asap because she is having pain and a smell. Please advise

## 2020-02-10 NOTE — TELEPHONE ENCOUNTER
----- Message from Tamika Ahuja sent at 2/10/2020 11:31 AM CST -----  Contact: Pt   Name of Who is Calling: SALUD NARVAEZ [[id number]]    What is the request in detail: Pt is requesting a call back from Dr. Barroso ......Please contact to further discuss and advise      Can the clinic reply by [facility]: No     What Number to Call Back if not in [facility]:  [phone] (home)

## 2020-02-10 NOTE — TELEPHONE ENCOUNTER
Returned patient's call. Patient wanted an appointment with Dr. Holman. Patient stated she's having some abdominal pain. Patient scheduled an office visit on 2/17/2020 with Dr. Holman.

## 2020-02-11 ENCOUNTER — TELEPHONE (OUTPATIENT)
Dept: INTERVENTIONAL RADIOLOGY/VASCULAR | Facility: HOSPITAL | Age: 75
End: 2020-02-11

## 2020-02-11 DIAGNOSIS — K83.1 BILIARY TRACT OBSTRUCTION: Primary | ICD-10-CM

## 2020-02-18 ENCOUNTER — TELEPHONE (OUTPATIENT)
Dept: INTERVENTIONAL RADIOLOGY/VASCULAR | Facility: HOSPITAL | Age: 75
End: 2020-02-18

## 2020-02-20 DIAGNOSIS — K83.1 BILIARY TRACT OBSTRUCTION: Primary | ICD-10-CM

## 2020-02-21 ENCOUNTER — ANESTHESIA (OUTPATIENT)
Dept: ENDOSCOPY | Facility: HOSPITAL | Age: 75
DRG: 871 | End: 2020-02-21
Payer: MEDICARE

## 2020-02-21 ENCOUNTER — ANESTHESIA EVENT (OUTPATIENT)
Dept: ENDOSCOPY | Facility: HOSPITAL | Age: 75
DRG: 871 | End: 2020-02-21
Payer: MEDICARE

## 2020-02-21 PROBLEM — K83.1 BILIARY TRACT OBSTRUCTION: Status: ACTIVE | Noted: 2020-02-21

## 2020-02-21 PROCEDURE — D9220A PRA ANESTHESIA: Mod: HCNC,ANES,, | Performed by: ANESTHESIOLOGY

## 2020-02-21 PROCEDURE — 63600175 PHARM REV CODE 636 W HCPCS: Mod: HCNC | Performed by: NURSE ANESTHETIST, CERTIFIED REGISTERED

## 2020-02-21 PROCEDURE — 25000003 PHARM REV CODE 250: Mod: HCNC | Performed by: NURSE ANESTHETIST, CERTIFIED REGISTERED

## 2020-02-21 PROCEDURE — D9220A PRA ANESTHESIA: ICD-10-PCS | Mod: HCNC,CRNA,, | Performed by: NURSE ANESTHETIST, CERTIFIED REGISTERED

## 2020-02-21 PROCEDURE — D9220A PRA ANESTHESIA: Mod: HCNC,CRNA,, | Performed by: NURSE ANESTHETIST, CERTIFIED REGISTERED

## 2020-02-21 PROCEDURE — 63600175 PHARM REV CODE 636 W HCPCS: Mod: HCNC | Performed by: INTERNAL MEDICINE

## 2020-02-21 PROCEDURE — D9220A PRA ANESTHESIA: ICD-10-PCS | Mod: HCNC,ANES,, | Performed by: ANESTHESIOLOGY

## 2020-02-21 RX ORDER — DEXAMETHASONE SODIUM PHOSPHATE 4 MG/ML
INJECTION, SOLUTION INTRA-ARTICULAR; INTRALESIONAL; INTRAMUSCULAR; INTRAVENOUS; SOFT TISSUE
Status: DISCONTINUED | OUTPATIENT
Start: 2020-02-21 | End: 2020-02-21

## 2020-02-21 RX ORDER — MIDAZOLAM HYDROCHLORIDE 1 MG/ML
INJECTION, SOLUTION INTRAMUSCULAR; INTRAVENOUS
Status: DISCONTINUED | OUTPATIENT
Start: 2020-02-21 | End: 2020-02-21

## 2020-02-21 RX ORDER — GLYCOPYRROLATE 0.2 MG/ML
INJECTION INTRAMUSCULAR; INTRAVENOUS
Status: DISCONTINUED | OUTPATIENT
Start: 2020-02-21 | End: 2020-02-21

## 2020-02-21 RX ORDER — PROPOFOL 10 MG/ML
VIAL (ML) INTRAVENOUS
Status: DISCONTINUED | OUTPATIENT
Start: 2020-02-21 | End: 2020-02-21

## 2020-02-21 RX ORDER — FENTANYL CITRATE 50 UG/ML
INJECTION, SOLUTION INTRAMUSCULAR; INTRAVENOUS
Status: DISCONTINUED | OUTPATIENT
Start: 2020-02-21 | End: 2020-02-21

## 2020-02-21 RX ORDER — ONDANSETRON 2 MG/ML
INJECTION INTRAMUSCULAR; INTRAVENOUS
Status: DISCONTINUED | OUTPATIENT
Start: 2020-02-21 | End: 2020-02-21

## 2020-02-21 RX ORDER — ROCURONIUM BROMIDE 10 MG/ML
INJECTION, SOLUTION INTRAVENOUS
Status: DISCONTINUED | OUTPATIENT
Start: 2020-02-21 | End: 2020-02-21

## 2020-02-21 RX ORDER — LIDOCAINE HYDROCHLORIDE 20 MG/ML
INJECTION INTRAVENOUS
Status: DISCONTINUED | OUTPATIENT
Start: 2020-02-21 | End: 2020-02-21

## 2020-02-21 RX ORDER — PHENYLEPHRINE HYDROCHLORIDE 10 MG/ML
INJECTION INTRAVENOUS
Status: DISCONTINUED | OUTPATIENT
Start: 2020-02-21 | End: 2020-02-21

## 2020-02-21 RX ORDER — NEOSTIGMINE METHYLSULFATE 0.5 MG/ML
INJECTION, SOLUTION INTRAVENOUS
Status: DISCONTINUED | OUTPATIENT
Start: 2020-02-21 | End: 2020-02-21

## 2020-02-21 RX ADMIN — NEOSTIGMINE METHYLSULFATE 5 MG: 0.5 INJECTION INTRAVENOUS at 01:02

## 2020-02-21 RX ADMIN — PROPOFOL 150 MG: 10 INJECTION, EMULSION INTRAVENOUS at 11:02

## 2020-02-21 RX ADMIN — DEXAMETHASONE SODIUM PHOSPHATE 4 MG: 4 INJECTION, SOLUTION INTRAMUSCULAR; INTRAVENOUS at 12:02

## 2020-02-21 RX ADMIN — PHENYLEPHRINE HYDROCHLORIDE 100 MCG: 10 INJECTION INTRAVENOUS at 11:02

## 2020-02-21 RX ADMIN — ROCURONIUM BROMIDE 10 MG: 10 INJECTION, SOLUTION INTRAVENOUS at 12:02

## 2020-02-21 RX ADMIN — LIDOCAINE HYDROCHLORIDE 100 MG: 20 INJECTION, SOLUTION INTRAVENOUS at 11:02

## 2020-02-21 RX ADMIN — FENTANYL CITRATE 50 MCG: 50 INJECTION, SOLUTION INTRAMUSCULAR; INTRAVENOUS at 11:02

## 2020-02-21 RX ADMIN — MIDAZOLAM HYDROCHLORIDE 2 MG: 1 INJECTION, SOLUTION INTRAMUSCULAR; INTRAVENOUS at 11:02

## 2020-02-21 RX ADMIN — SODIUM CHLORIDE: 0.9 INJECTION, SOLUTION INTRAVENOUS at 11:02

## 2020-02-21 RX ADMIN — FENTANYL CITRATE 25 MCG: 50 INJECTION, SOLUTION INTRAMUSCULAR; INTRAVENOUS at 12:02

## 2020-02-21 RX ADMIN — FENTANYL CITRATE 25 MCG: 50 INJECTION, SOLUTION INTRAMUSCULAR; INTRAVENOUS at 01:02

## 2020-02-21 RX ADMIN — SODIUM CHLORIDE 0.3 MCG/KG/MIN: 9 INJECTION, SOLUTION INTRAVENOUS at 11:02

## 2020-02-21 RX ADMIN — GLYCOPYRROLATE 0.6 MG: 0.2 INJECTION, SOLUTION INTRAMUSCULAR; INTRAVENOUS at 01:02

## 2020-02-21 RX ADMIN — ONDANSETRON 4 MG: 2 INJECTION INTRAMUSCULAR; INTRAVENOUS at 12:02

## 2020-02-21 RX ADMIN — ROCURONIUM BROMIDE 40 MG: 10 INJECTION, SOLUTION INTRAVENOUS at 11:02

## 2020-02-21 RX ADMIN — SODIUM CHLORIDE: 0.9 INJECTION, SOLUTION INTRAVENOUS at 01:02

## 2020-02-21 NOTE — TRANSFER OF CARE
Anesthesia Transfer of Care Note    Patient: Angelica Tomlinson    Procedure(s) Performed: Procedure(s) (LRB):  CHOLANGIOGRAM (N/A)    Patient location: [facility]    Anesthesia Type: general    Transport from OR: Transported from OR on 6-10 L/min O2 by face mask with adequate spontaneous ventilation    Post pain: adequate analgesia    Post assessment: no apparent anesthetic complications    Post vital signs: stable    Level of consciousness: awake    Nausea/Vomiting: no nausea/vomiting    Complications: none    Transfer of care protocol was followed      Last vitals:   Visit Vitals  BP (!) 183/87   Pulse 71   Temp 36.6 °C (97.9 °F) (Oral)   Resp 16   Ht 6' (1.829 m)   Wt 81.6 kg (180 lb)   SpO2 100%   Breastfeeding? No   BMI 24.41 kg/m²

## 2020-02-21 NOTE — ANESTHESIA PROCEDURE NOTES
Intubation  Performed by: Lucinda Mendoza CRNA  Authorized by: Arsalan Zhou MD     Intubation:     Induction:  Intravenous    Intubated:  Postinduction    Mask Ventilation:  Easy mask    Attempts:  1    Attempted By:  CRNA    Method of Intubation:  Direct    Blade:  Ellis 2    Laryngeal View Grade: Grade I - full view of chords      Difficult Airway Encountered?: No      Complications:  None    Airway Device:  Oral endotracheal tube    Airway Device Size:  7.5    Style/Cuff Inflation:  Cuffed (inflated to minimal occlusive pressure)    Tube secured:  23    Secured at:  The lips    Placement Verified By:  Capnometry    Complicating Factors:  None

## 2020-02-21 NOTE — ANESTHESIA POSTPROCEDURE EVALUATION
Anesthesia Post Evaluation    Patient: Angelica Tomlinson    Procedure(s) Performed: Procedure(s) (LRB):  CHOLANGIOGRAM (N/A)    Final Anesthesia Type: general    Patient location during evaluation: PACU  Patient participation: Yes- Able to Participate  Level of consciousness: awake and alert, awake and oriented  Post-procedure vital signs: reviewed and stable  Pain management: adequate  Airway patency: patent    PONV status at discharge: No PONV  Anesthetic complications: no      Cardiovascular status: blood pressure returned to baseline, stable and hemodynamically stable  Respiratory status: unassisted, spontaneous ventilation and room air  Hydration status: euvolemic  Follow-up not needed.          Vitals Value Taken Time   /76 2/21/2020  3:17 PM   Temp 36.1 °C (97 °F) 2/21/2020  3:15 PM   Pulse 67 2/21/2020  3:18 PM   Resp 16 2/21/2020  3:15 PM   SpO2 96 % 2/21/2020  3:18 PM   Vitals shown include unvalidated device data.      No case tracking events are documented in the log.      Pain/Mine Score: Pain Rating Prior to Med Admin: 7 (2/21/2020  2:41 PM)  Mine Score: 10 (2/21/2020  2:23 PM)

## 2020-02-21 NOTE — ANESTHESIA PREPROCEDURE EVALUATION
[facility]  Anesthesia Pre-Operative Evaluation       Patient Name: Angelica Tomlinson  YOB: 1945  MRN: [id number]  [facility]: [id number]      Code Status: Full Code   Date of Procedure: 12/24/2019  Procedure: Procedure(s) (LRB):  CHOLANGIOGRAM (N/A)  Anesthesia: General  Pre-Operative Diagnosis: Final diagnoses:  None  Proceduralist/Surgeon(s) and Role:     * Monse Rayo MD - Primary    SUBJECTIVE:   Angelica Tomlinson is a 74 y.o. female here for repeat ERCP with stent placement under MAC/GA. Underwent ERCP on 10/21/19, 10/25/19, and 12/17  with NAAC. Tolerated GA w natural airway well.    LDA:        Peripheral IV - Single Lumen 02/21/20 0952 20 G Right Hand (Active)   Number of days: 0            Biliary Tube 12/26/19 1202 10 Fr. RUQ (Active)   Number of days: 56            Drain/Device  (Active)   Number of days:       Anesthesia Evaluation      Airway   Mallampati: III  TM distance: Normal, at least 6 cm  Neck ROM: Normal ROM  Dental    (+) Upper Dentures, Lower Dentures and Periodontal disease, Severe        Pulmonary    Cardiovascular   Exercise tolerance: good  (+) hypertension well controlled,     ECG reviewed  Rate: Normal    Neuro/Psych      GI/Hepatic/Renal    (+) hiatal hernia, GERD,     Endo/Other    Abdominal                     ALLERGIES:   Review of patient's allergies indicates:  No Known Allergies  MEDICATIONS:     Current Outpatient Medications on File Prior to Visit   Medication Sig Dispense Refill Last Dose    amLODIPine (NORVASC) 10 MG tablet Take 1 tablet (10 mg total) by mouth once daily. 90 tablet 3 More than a month at Unknown time    ammonium lactate 12 % Crea APPLY CREAM TOPICALLY TO AFFECTED AREA TWICE DAILY TO THE FEET THEN APPLY SOCKS ON FOR 3 HOURS FILE HARD SKIN ONCE A WEEK  10 Unknown at Unknown time    aspirin 81 MG Chew    Past Week at Unknown time    cholestyramine (QUESTRAN) 4 gram packet Mix 1 packet (4 g total) and take by mouth 2  (two) times daily. for 10 days 20 packet 0 Taking    dexAMETHasone (DECADRON) 4 MG Tab Take 1/2 tablet by mouth with food in the  morning and 1/2 tablet by mouth with food in the evening.   2/21/2020 at 0700    fentaNYL (DURAGESIC) 50 mcg/hr    Past Week at Unknown time    lisinopril (PRINIVIL,ZESTRIL) 20 MG tablet    More than a month at Unknown time    omeprazole (PRILOSEC) 20 MG capsule    Past Month at Unknown time    ondansetron (ZOFRAN-ODT) 8 MG TbDL Take 8 mg by mouth.   Past Month at Unknown time    oxyCODONE (ROXICODONE) 10 mg Tab immediate release tablet    2/20/2020 at 0800    polyethylene glycol (MIRALAX) 17 gram PwPk Take by mouth.   Past Month at Unknown time    vitamin D 1000 units Tab Take 185 mg by mouth once daily.   Past Week at Unknown time     No current facility-administered medications for this visit.      No current outpatient medications on file.     Facility-Administered Medications Ordered in Other Visits   Medication Dose Route Frequency Provider Last Rate Last Dose    0.9%  NaCl infusion   Intravenous Continuous Luis Antonio Lu MD   Stopped at 12/17/19 1537    ampicillin-sulbactam 3 g in sodium chloride 0.9 % 100 mL IVPB (ready to mix system)  3 g Intravenous Once Pre-Op Sydney Strauss,  mL/hr at 02/21/20 0953 3 g at 02/21/20 0953    sodium chloride 0.9% flush 10 mL  10 mL Intravenous PRN Luis Antonio Lu MD              History:   There are no hospital problems to display for this patient.    Patient Active Problem List   Diagnosis    Hiatal hernia    Gallbladder cancer    HTN (hypertension)    Obstructive jaundice    Biliary obstruction    Biliary stricture    Cholangitis    Duodenal stenosis    Pain    Biliary tract obstruction      Past Medical History:   Diagnosis Date    Biliary obstruction     Cancer     Gallbladder    Cholangiocarcinoma     Constipation     Cyst of breast, left, benign solitary     Diverticulosis     Duodenal mass 5/16/2016     "Duodenal stenosis     Helicobacter pylori gastritis     Hiatal hernia     Hx of colonic polyp     Tobacco abuse 10/9/2019     Past Surgical History:   Procedure Laterality Date    APPENDECTOMY      CHOLANGIOGRAM N/A 12/26/2019    Procedure: CHOLANGIOGRAM;  Surgeon: Michelle Surgeon;  Location: [facility];  Service: Anesthesiology;  Laterality: N/A;    colon polyps      COLONOSCOPY      ENDOSCOPIC ULTRASOUND OF UPPER GASTROINTESTINAL TRACT N/A 10/25/2019    Procedure: ULTRASOUND, UPPER GI TRACT, ENDOSCOPIC;  Surgeon: Luis Antonio Lu MD;  Location: [facility];  Service: Endoscopy;  Laterality: N/A;    ERCP N/A 10/21/2019    Procedure: ERCP (ENDOSCOPIC RETROGRADE CHOLANGIOPANCREATOGRAPHY);  Surgeon: Luis Antonio Lu MD;  Location: [facility];  Service: Endoscopy;  Laterality: N/A;    ERCP N/A 12/17/2019    Procedure: ERCP (ENDOSCOPIC RETROGRADE CHOLANGIOPANCREATOGRAPHY);  Surgeon: Monse Rayo MD;  Location: [facility];  Service: Endoscopy;  Laterality: N/A;    ERCP N/A 12/24/2019    Procedure: ERCP (ENDOSCOPIC RETROGRADE CHOLANGIOPANCREATOGRAPHY);  Surgeon: Monse Rayo MD;  Location: [facility] ([address] [place] [address]);  Service: Endoscopy;  Laterality: N/A;  Will need duodenal stent as well as permanent biliary stents (likely 6 x 8 and 6 x 10 epic stents).  Dr Arnav Rayo    ERCP W/ PLASTIC STENT PLACEMENT  10/21/2019    ESOPHAGOGASTRODUODENOSCOPY N/A 10/25/2019    Procedure: EGD (ESOPHAGOGASTRODUODENOSCOPY);  Surgeon: Luis Antonio Lu MD;  Location: [facility];  Service: Endoscopy;  Laterality: N/A;     Alcohol use:    Social History     Substance and Sexual Activity   Alcohol Use No          OBJECTIVE:   Last 3 sets of Vitals    Vitals - 1 value per visit 12/27/2019 1/8/2020 2/21/2020   SYSTOLIC 153 102 170   DIASTOLIC 69 52 72   PULSE 64 88 70   TEMPERATURE 97.9 97.9 97.9   RESPIRATIONS 18 - 17   SPO2 98 97 100   Weight (lb) - 136.47 180   Weight (kg) - 61.9 81.647   HEIGHT - 6' 0" 6' 0"   BODY MASS INDEX - 18.51 " 24.41   VISIT REPORT - - -   Pain Score  - 4 -       Vital Signs (Most Recent):    Vital Signs Range (Last 24H):  Temp:  [36.6 °C (97.9 °F)]   Pulse:  [70]   Resp:  [17]   BP: (170)/(72)   SpO2:  [100 %]        No intake or output data in the 24 hours ending 02/21/20 1006    There is no height or weight on file to calculate BMI.     Significant Labs:  Lab Results   Component Value Date    WBC 9.38 02/21/2020    HGB 12.5 02/21/2020    HCT 40.7 02/21/2020     02/21/2020    CHOL 282 (H) 10/09/2019    TRIG 111 10/09/2019    HDL 49 10/09/2019    ALT 45 (H) 02/21/2020    AST 28 02/21/2020     02/21/2020    K 4.6 02/21/2020     02/21/2020    CREATININE 0.7 02/21/2020    BUN 12 02/21/2020    CO2 28 02/21/2020    TSH 1.670 10/09/2019    INR 1.0 02/21/2020    HGBA1C 4.8 10/09/2019     No results found for: PREGTESTUR, PREGSERUM, HCG, HCGQUANT   No LMP recorded. Patient is postmenopausal.    EKG:   Results for orders placed or performed during the hospital encounter of 05/03/16   SCHEDULED EKG 12-LEAD (to Muse)    Collection Time: 05/03/16 12:09 PM    Narrative    Test Reason : K31.89  Blood Pressure : ** mmHG  Vent. Rate : 061 BPM     Atrial Rate : 061 BPM     P-R Int : 188 ms          QRS Dur : 110 ms      QT Int : 438 ms       P-R-T Axes : 029 002 072 degrees     QTc Int : 440 ms    Normal sinus rhythm  Normal ECG  No previous ECGs available  Confirmed by FERNANDO ROD MD ([id number]) on 5/3/2016 2:40:27 PM    Referred By: REFUGIO LARSEN           Confirmed By:FERNANDO ROD MD       TTE:  No results found for this or any previous visit.  No results found for: EF  No results found for this or any previous visit.  GIOVANNY:  No results found for this or any previous visit.  Stress Test:  No results found for this or any previous visit.   LHC:  No results found for this or any previous visit.   PFT:  No results found for: FEV1, FVC, PFS5OMU, TLC, DLCO     ASSESSMENT/PLAN:         Pre-op Assessment    I have reviewed  the Patient Summary Reports.    I have reviewed the Nursing Notes.   I have reviewed the Medications.     Review of Systems  Anesthesia Hx:  No problems with previous Anesthesia  History of prior surgery of interest to airway management or planning: Denies Family Hx of Anesthesia complications.   Denies Personal Hx of Anesthesia complications.   Social:  No Alcohol Use, Smoker    Hematology/Oncology:         -- Anemia: Current/Recent Cancer.   EENT/Dental:EENT/Dental Normal   Cardiovascular:   Exercise tolerance: good Hypertension, well controlled ECG has been reviewed.    Pulmonary:  Pulmonary Normal    Renal/:  Renal/ Normal     Hepatic/GI:   Hiatal Hernia, GERD Gallbladder cancer stage 3b   Musculoskeletal:  Musculoskeletal Normal    Neurological:  Neurology Normal    Endocrine:  Endocrine Normal    Dermatological:  Skin Normal    Psych:  Psychiatric Normal           Physical Exam  General:  Well nourished    Airway/Jaw/Neck:  Airway Findings: Mouth Opening: Normal Tongue: Normal  General Airway Assessment: Adult  Mallampati: III  Improves to II with phonation.  TM Distance: Normal, at least 6 cm  Jaw/Neck Findings:  Neck ROM: Normal ROM      Dental:  Dental Findings: Upper Dentures, Lower Dentures, Periodontal disease, Severe    Chest/Lungs:  Chest/Lungs Findings: Clear to auscultation, Normal Respiratory Rate     Heart/Vascular:  Heart Findings: Rate: Normal  Rhythm: Regular Rhythm  Sounds: Normal        Mental Status:  Mental Status Findings:  Cooperative, Alert and Oriented         Anesthesia Plan  Type of Anesthesia, risks & benefits discussed:  Anesthesia Type:  general  Patient's Preference:   Intra-op Monitoring Plan: standard ASA monitors  Intra-op Monitoring Plan Comments:   Post Op Pain Control Plan: per primary service following discharge from PACU  Post Op Pain Control Plan Comments:   Induction:   IV  Beta Blocker:  Patient is not currently on a Beta-Blocker (No further documentation required).        Informed Consent: Patient understands risks and agrees with Anesthesia plan.  Questions answered. Anesthesia consent signed with patient.  ASA Score: 3     Day of Surgery Review of History & Physical:    H&P update referred to the surgeon.  H&P completed by Anesthesiologist.       Ready For Surgery From Anesthesia Perspective.

## 2020-02-22 ENCOUNTER — NURSE TRIAGE (OUTPATIENT)
Dept: ADMINISTRATIVE | Facility: CLINIC | Age: 75
End: 2020-02-22

## 2020-02-22 ENCOUNTER — HOSPITAL ENCOUNTER (INPATIENT)
Facility: HOSPITAL | Age: 75
LOS: 5 days | Discharge: HOME HEALTH | DRG: 871 | End: 2020-02-27
Attending: EMERGENCY MEDICINE | Admitting: HOSPITALIST
Payer: MEDICARE

## 2020-02-22 DIAGNOSIS — K85.90 ACUTE PANCREATITIS, UNSPECIFIED COMPLICATION STATUS, UNSPECIFIED PANCREATITIS TYPE: ICD-10-CM

## 2020-02-22 DIAGNOSIS — Z51.81 THERAPEUTIC DRUG MONITORING: ICD-10-CM

## 2020-02-22 DIAGNOSIS — K83.1 BILIARY OBSTRUCTION: ICD-10-CM

## 2020-02-22 DIAGNOSIS — R10.9 ABDOMINAL PAIN, UNSPECIFIED ABDOMINAL LOCATION: Primary | ICD-10-CM

## 2020-02-22 DIAGNOSIS — K83.1 OBSTRUCTIVE JAUNDICE: ICD-10-CM

## 2020-02-22 DIAGNOSIS — K83.09 CHOLANGITIS: ICD-10-CM

## 2020-02-22 DIAGNOSIS — C23 GALLBLADDER CANCER: ICD-10-CM

## 2020-02-22 PROBLEM — R94.31 PROLONGED Q-T INTERVAL ON ECG: Status: ACTIVE | Noted: 2020-02-22

## 2020-02-22 PROBLEM — G89.3 CANCER RELATED PAIN: Status: ACTIVE | Noted: 2019-12-25

## 2020-02-22 PROBLEM — E55.9 VITAMIN D DEFICIENCY: Status: ACTIVE | Noted: 2020-02-22

## 2020-02-22 PROBLEM — A41.9 SEPSIS: Status: ACTIVE | Noted: 2020-02-22

## 2020-02-22 PROBLEM — E87.1 HYPONATREMIA: Status: ACTIVE | Noted: 2020-02-22

## 2020-02-22 PROBLEM — Z75.8 DISCHARGE PLANNING ISSUES: Status: ACTIVE | Noted: 2020-02-22

## 2020-02-22 LAB
ALBUMIN SERPL BCP-MCNC: 3.5 G/DL (ref 3.5–5.2)
ALP SERPL-CCNC: 293 U/L (ref 55–135)
ALT SERPL W/O P-5'-P-CCNC: 99 U/L (ref 10–44)
ANION GAP SERPL CALC-SCNC: 10 MMOL/L (ref 8–16)
AST SERPL-CCNC: 74 U/L (ref 10–40)
BACTERIA #/AREA URNS AUTO: NORMAL /HPF
BASOPHILS # BLD AUTO: 0.04 K/UL (ref 0–0.2)
BASOPHILS NFR BLD: 0.2 % (ref 0–1.9)
BILIRUB SERPL-MCNC: 1.3 MG/DL (ref 0.1–1)
BILIRUB UR QL STRIP: NEGATIVE
BUN SERPL-MCNC: 9 MG/DL (ref 8–23)
CALCIUM SERPL-MCNC: 9.2 MG/DL (ref 8.7–10.5)
CHLORIDE SERPL-SCNC: 96 MMOL/L (ref 95–110)
CLARITY UR REFRACT.AUTO: CLEAR
CO2 SERPL-SCNC: 25 MMOL/L (ref 23–29)
COLOR UR AUTO: YELLOW
CREAT SERPL-MCNC: 0.6 MG/DL (ref 0.5–1.4)
DIFFERENTIAL METHOD: ABNORMAL
EOSINOPHIL # BLD AUTO: 0 K/UL (ref 0–0.5)
EOSINOPHIL NFR BLD: 0 % (ref 0–8)
ERYTHROCYTE [DISTWIDTH] IN BLOOD BY AUTOMATED COUNT: 16.2 % (ref 11.5–14.5)
EST. GFR  (AFRICAN AMERICAN): >60 ML/MIN/1.73 M^2
EST. GFR  (NON AFRICAN AMERICAN): >60 ML/MIN/1.73 M^2
GLUCOSE SERPL-MCNC: 148 MG/DL (ref 70–110)
GLUCOSE UR QL STRIP: ABNORMAL
HCT VFR BLD AUTO: 40.3 % (ref 37–48.5)
HGB BLD-MCNC: 13 G/DL (ref 12–16)
HGB UR QL STRIP: NEGATIVE
HYALINE CASTS UR QL AUTO: 0 /LPF
IMM GRANULOCYTES # BLD AUTO: 0.11 K/UL (ref 0–0.04)
IMM GRANULOCYTES NFR BLD AUTO: 0.6 % (ref 0–0.5)
KETONES UR QL STRIP: NEGATIVE
LEUKOCYTE ESTERASE UR QL STRIP: NEGATIVE
LIPASE SERPL-CCNC: 354 U/L (ref 4–60)
LYMPHOCYTES # BLD AUTO: 1.2 K/UL (ref 1–4.8)
LYMPHOCYTES NFR BLD: 5.9 % (ref 18–48)
MCH RBC QN AUTO: 29.3 PG (ref 27–31)
MCHC RBC AUTO-ENTMCNC: 32.3 G/DL (ref 32–36)
MCV RBC AUTO: 91 FL (ref 82–98)
MICROSCOPIC COMMENT: NORMAL
MONOCYTES # BLD AUTO: 1.3 K/UL (ref 0.3–1)
MONOCYTES NFR BLD: 6.8 % (ref 4–15)
NEUTROPHILS # BLD AUTO: 16.7 K/UL (ref 1.8–7.7)
NEUTROPHILS NFR BLD: 86.5 % (ref 38–73)
NITRITE UR QL STRIP: NEGATIVE
NRBC BLD-RTO: 0 /100 WBC
PH UR STRIP: 7 [PH] (ref 5–8)
PLATELET # BLD AUTO: 246 K/UL (ref 150–350)
PMV BLD AUTO: 10.5 FL (ref 9.2–12.9)
POTASSIUM SERPL-SCNC: 3.7 MMOL/L (ref 3.5–5.1)
PROT SERPL-MCNC: 7.7 G/DL (ref 6–8.4)
PROT UR QL STRIP: ABNORMAL
RBC # BLD AUTO: 4.43 M/UL (ref 4–5.4)
RBC #/AREA URNS AUTO: 3 /HPF (ref 0–4)
SODIUM SERPL-SCNC: 131 MMOL/L (ref 136–145)
SP GR UR STRIP: 1.01 (ref 1–1.03)
SQUAMOUS #/AREA URNS AUTO: 1 /HPF
URN SPEC COLLECT METH UR: ABNORMAL
WBC # BLD AUTO: 19.34 K/UL (ref 3.9–12.7)
WBC #/AREA URNS AUTO: 2 /HPF (ref 0–5)

## 2020-02-22 PROCEDURE — 83690 ASSAY OF LIPASE: CPT | Mod: HCNC

## 2020-02-22 PROCEDURE — S0030 INJECTION, METRONIDAZOLE: HCPCS | Mod: HCNC | Performed by: STUDENT IN AN ORGANIZED HEALTH CARE EDUCATION/TRAINING PROGRAM

## 2020-02-22 PROCEDURE — 63600175 PHARM REV CODE 636 W HCPCS: Mod: HCNC | Performed by: EMERGENCY MEDICINE

## 2020-02-22 PROCEDURE — 63600175 PHARM REV CODE 636 W HCPCS: Mod: HCNC | Performed by: PHYSICIAN ASSISTANT

## 2020-02-22 PROCEDURE — 63600175 PHARM REV CODE 636 W HCPCS: Mod: HCNC | Performed by: STUDENT IN AN ORGANIZED HEALTH CARE EDUCATION/TRAINING PROGRAM

## 2020-02-22 PROCEDURE — 25000003 PHARM REV CODE 250: Mod: HCNC | Performed by: EMERGENCY MEDICINE

## 2020-02-22 PROCEDURE — 94761 N-INVAS EAR/PLS OXIMETRY MLT: CPT | Mod: HCNC

## 2020-02-22 PROCEDURE — 99285 EMERGENCY DEPT VISIT HI MDM: CPT | Mod: 25,HCNC

## 2020-02-22 PROCEDURE — 85025 COMPLETE CBC W/AUTO DIFF WBC: CPT | Mod: HCNC

## 2020-02-22 PROCEDURE — 80053 COMPREHEN METABOLIC PANEL: CPT | Mod: HCNC

## 2020-02-22 PROCEDURE — 96361 HYDRATE IV INFUSION ADD-ON: CPT | Mod: HCNC

## 2020-02-22 PROCEDURE — 99285 EMERGENCY DEPT VISIT HI MDM: CPT | Mod: HCNC,,, | Performed by: EMERGENCY MEDICINE

## 2020-02-22 PROCEDURE — 25000003 PHARM REV CODE 250: Mod: HCNC | Performed by: STUDENT IN AN ORGANIZED HEALTH CARE EDUCATION/TRAINING PROGRAM

## 2020-02-22 PROCEDURE — 11000001 HC ACUTE MED/SURG PRIVATE ROOM: Mod: HCNC

## 2020-02-22 PROCEDURE — 99223 PR INITIAL HOSPITAL CARE,LEVL III: ICD-10-PCS | Mod: AI,HCNC,GC, | Performed by: HOSPITALIST

## 2020-02-22 PROCEDURE — S0030 INJECTION, METRONIDAZOLE: HCPCS | Mod: HCNC | Performed by: EMERGENCY MEDICINE

## 2020-02-22 PROCEDURE — 99285 PR EMERGENCY DEPT VISIT,LEVEL V: ICD-10-PCS | Mod: HCNC,,, | Performed by: EMERGENCY MEDICINE

## 2020-02-22 PROCEDURE — 81001 URINALYSIS AUTO W/SCOPE: CPT | Mod: HCNC

## 2020-02-22 PROCEDURE — 87040 BLOOD CULTURE FOR BACTERIA: CPT | Mod: HCNC

## 2020-02-22 PROCEDURE — 96374 THER/PROPH/DIAG INJ IV PUSH: CPT | Mod: HCNC

## 2020-02-22 PROCEDURE — 99223 1ST HOSP IP/OBS HIGH 75: CPT | Mod: AI,HCNC,GC, | Performed by: HOSPITALIST

## 2020-02-22 RX ORDER — HYDROMORPHONE HYDROCHLORIDE 1 MG/ML
0.5 INJECTION, SOLUTION INTRAMUSCULAR; INTRAVENOUS; SUBCUTANEOUS EVERY 6 HOURS PRN
Status: DISCONTINUED | OUTPATIENT
Start: 2020-02-22 | End: 2020-02-23

## 2020-02-22 RX ORDER — CHOLECALCIFEROL (VITAMIN D3) 25 MCG
1000 TABLET ORAL DAILY
Status: DISCONTINUED | OUTPATIENT
Start: 2020-02-22 | End: 2020-02-25

## 2020-02-22 RX ORDER — GLUCAGON 1 MG
1 KIT INJECTION
Status: DISCONTINUED | OUTPATIENT
Start: 2020-02-22 | End: 2020-02-27 | Stop reason: HOSPADM

## 2020-02-22 RX ORDER — IBUPROFEN 200 MG
16 TABLET ORAL
Status: DISCONTINUED | OUTPATIENT
Start: 2020-02-22 | End: 2020-02-27 | Stop reason: HOSPADM

## 2020-02-22 RX ORDER — PANTOPRAZOLE SODIUM 40 MG/1
40 TABLET, DELAYED RELEASE ORAL DAILY
Status: DISCONTINUED | OUTPATIENT
Start: 2020-02-22 | End: 2020-02-27 | Stop reason: HOSPADM

## 2020-02-22 RX ORDER — METRONIDAZOLE 500 MG/100ML
500 INJECTION, SOLUTION INTRAVENOUS
Status: COMPLETED | OUTPATIENT
Start: 2020-02-22 | End: 2020-02-22

## 2020-02-22 RX ORDER — HYDROMORPHONE HYDROCHLORIDE 1 MG/ML
0.5 INJECTION, SOLUTION INTRAMUSCULAR; INTRAVENOUS; SUBCUTANEOUS ONCE
Status: COMPLETED | OUTPATIENT
Start: 2020-02-22 | End: 2020-02-22

## 2020-02-22 RX ORDER — FENTANYL 50 UG/1
1 PATCH TRANSDERMAL
Status: DISCONTINUED | OUTPATIENT
Start: 2020-02-24 | End: 2020-02-27 | Stop reason: HOSPADM

## 2020-02-22 RX ORDER — SODIUM CHLORIDE 0.9 % (FLUSH) 0.9 %
10 SYRINGE (ML) INJECTION
Status: DISCONTINUED | OUTPATIENT
Start: 2020-02-22 | End: 2020-02-27 | Stop reason: HOSPADM

## 2020-02-22 RX ORDER — TALC
6 POWDER (GRAM) TOPICAL NIGHTLY PRN
Status: DISCONTINUED | OUTPATIENT
Start: 2020-02-22 | End: 2020-02-27 | Stop reason: HOSPADM

## 2020-02-22 RX ORDER — HYDROMORPHONE HYDROCHLORIDE 1 MG/ML
0.5 INJECTION, SOLUTION INTRAMUSCULAR; INTRAVENOUS; SUBCUTANEOUS
Status: COMPLETED | OUTPATIENT
Start: 2020-02-22 | End: 2020-02-22

## 2020-02-22 RX ORDER — IBUPROFEN 200 MG
24 TABLET ORAL
Status: DISCONTINUED | OUTPATIENT
Start: 2020-02-22 | End: 2020-02-27 | Stop reason: HOSPADM

## 2020-02-22 RX ORDER — ENOXAPARIN SODIUM 100 MG/ML
40 INJECTION SUBCUTANEOUS EVERY 24 HOURS
Status: DISCONTINUED | OUTPATIENT
Start: 2020-02-22 | End: 2020-02-25

## 2020-02-22 RX ORDER — SODIUM CHLORIDE, SODIUM LACTATE, POTASSIUM CHLORIDE, CALCIUM CHLORIDE 600; 310; 30; 20 MG/100ML; MG/100ML; MG/100ML; MG/100ML
INJECTION, SOLUTION INTRAVENOUS CONTINUOUS
Status: DISCONTINUED | OUTPATIENT
Start: 2020-02-22 | End: 2020-02-22

## 2020-02-22 RX ORDER — PROCHLORPERAZINE EDISYLATE 5 MG/ML
5 INJECTION INTRAMUSCULAR; INTRAVENOUS ONCE
Status: COMPLETED | OUTPATIENT
Start: 2020-02-22 | End: 2020-02-22

## 2020-02-22 RX ORDER — METRONIDAZOLE 500 MG/100ML
500 INJECTION, SOLUTION INTRAVENOUS
Status: DISCONTINUED | OUTPATIENT
Start: 2020-02-22 | End: 2020-02-23

## 2020-02-22 RX ORDER — POLYETHYLENE GLYCOL 3350 17 G/17G
17 POWDER, FOR SOLUTION ORAL DAILY
Status: DISCONTINUED | OUTPATIENT
Start: 2020-02-22 | End: 2020-02-23

## 2020-02-22 RX ORDER — SODIUM CHLORIDE, SODIUM LACTATE, POTASSIUM CHLORIDE, CALCIUM CHLORIDE 600; 310; 30; 20 MG/100ML; MG/100ML; MG/100ML; MG/100ML
INJECTION, SOLUTION INTRAVENOUS CONTINUOUS
Status: ACTIVE | OUTPATIENT
Start: 2020-02-22 | End: 2020-02-22

## 2020-02-22 RX ORDER — ONDANSETRON 4 MG/1
4 TABLET, ORALLY DISINTEGRATING ORAL EVERY 8 HOURS PRN
Status: DISCONTINUED | OUTPATIENT
Start: 2020-02-22 | End: 2020-02-27 | Stop reason: HOSPADM

## 2020-02-22 RX ORDER — CIPROFLOXACIN 2 MG/ML
400 INJECTION, SOLUTION INTRAVENOUS
Status: COMPLETED | OUTPATIENT
Start: 2020-02-22 | End: 2020-02-22

## 2020-02-22 RX ADMIN — SODIUM CHLORIDE, SODIUM LACTATE, POTASSIUM CHLORIDE, AND CALCIUM CHLORIDE: .6; .31; .03; .02 INJECTION, SOLUTION INTRAVENOUS at 08:02

## 2020-02-22 RX ADMIN — CIPROFLOXACIN 400 MG: 2 INJECTION, SOLUTION INTRAVENOUS at 07:02

## 2020-02-22 RX ADMIN — HYDROMORPHONE HYDROCHLORIDE 0.5 MG: 1 INJECTION, SOLUTION INTRAMUSCULAR; INTRAVENOUS; SUBCUTANEOUS at 09:02

## 2020-02-22 RX ADMIN — PROCHLORPERAZINE EDISYLATE 5 MG: 5 INJECTION INTRAMUSCULAR; INTRAVENOUS at 09:02

## 2020-02-22 RX ADMIN — METRONIDAZOLE 500 MG: 500 INJECTION, SOLUTION INTRAVENOUS at 08:02

## 2020-02-22 RX ADMIN — METRONIDAZOLE 500 MG: 500 INJECTION, SOLUTION INTRAVENOUS at 09:02

## 2020-02-22 RX ADMIN — SODIUM CHLORIDE, SODIUM LACTATE, POTASSIUM CHLORIDE, AND CALCIUM CHLORIDE: .6; .31; .03; .02 INJECTION, SOLUTION INTRAVENOUS at 12:02

## 2020-02-22 RX ADMIN — POLYETHYLENE GLYCOL 3350 17 G: 17 POWDER, FOR SOLUTION ORAL at 12:02

## 2020-02-22 RX ADMIN — CEFTRIAXONE 2 G: 2 INJECTION, SOLUTION INTRAVENOUS at 09:02

## 2020-02-22 RX ADMIN — HYDROMORPHONE HYDROCHLORIDE 0.5 MG: 1 INJECTION, SOLUTION INTRAMUSCULAR; INTRAVENOUS; SUBCUTANEOUS at 05:02

## 2020-02-22 RX ADMIN — SODIUM CHLORIDE 1000 ML: 0.9 INJECTION, SOLUTION INTRAVENOUS at 06:02

## 2020-02-22 RX ADMIN — VITAMIN D, TAB 1000IU (100/BT) 1000 UNITS: 25 TAB at 12:02

## 2020-02-22 RX ADMIN — ENOXAPARIN SODIUM 40 MG: 100 INJECTION SUBCUTANEOUS at 05:02

## 2020-02-22 RX ADMIN — PANTOPRAZOLE SODIUM 40 MG: 40 TABLET, DELAYED RELEASE ORAL at 12:02

## 2020-02-22 NOTE — ED TRIAGE NOTES
Pt reports cholangiogram done earlier today and states she has been having nausea, vomiting, and abdominal pain. Pt reports drinking ensure and the symptoms starting after.

## 2020-02-22 NOTE — ASSESSMENT & PLAN NOTE
QTc on . No cardiac or arrhythmia hx. Asymptomatic. On prn ODT Zpfran 8mg outpatient without issue.    - low dose prn zofran, will be mindful of QT prolonging effects of anti-emetics  - Daily EKG

## 2020-02-22 NOTE — ASSESSMENT & PLAN NOTE
- PT/OT consulted for discharge planning  - dispo pending improvement in pain and down trending labs

## 2020-02-22 NOTE — ASSESSMENT & PLAN NOTE
Mild hyponatremia on admission, 131. Likely hypovolemic 2/2 poor po intake and multiple episodes of vomiting. Asymptomatic.    - Trend daily

## 2020-02-22 NOTE — H&P
[facility] Medicine  History & Physical    Patient Name: Angelica Tomlinson  MRN: [id number]  Admission Date: 2/22/2020  Attending Physician: Monse Cormier MD   Primary Care Provider: Miguel Barroso MD    [facility] Medicine Team: [facility] HOSP MED 3 Dahiana Isaacs MD     Patient information was obtained from patient, spouse/SO, past medical records and ER records.     Subjective:     Principal Problem:Cholangitis    Chief Complaint:   Chief Complaint   Patient presents with    Abdominal Pain     cholangiogram today        HPI: Ms. Tomlinson is a 75yo F with HTN (on Amlodipine previously, but stopped by PCP 2/2 low/normal BP) and advanced stage IIIB gallbladder cancer initially diagnosed in 2016 s/p chemo and radiation and complicated by biliary obstruction who presents on 2/22/20 for abd pain, nausea, and vomiting, following IR transhepatic biliary stent exchange on 2/21/20 (she had PTC drain in place). At time of procedure, trial of internal drainage by turning off external drain. Procedure was uncomplicated and she was d/c same day. Since getting home from the procedure, she reports abdominal pain (worse than normal cancer-related pain) N/V, with multiple episodes of bilious, non-bloody emesis. Denies fevers but reports chills. Denies CP, palpitations, SOB, diarrhea. Last BM was Wednesday. She takes occasional prn ODT Zofran at home with moderate relief in nausea (although she has not taken it since recent procedure). Her usual pain med regimen includes fentanyl patch q72h (current patch was applied 2/21) and po oxycodone. Her normal pain regimen has not been sufficient for her current abd pain.    She was admitted in 12/2019 with similar presentation and was treated for cholangitis, discharged on augmentin. She was diagnosed with gallbladder cancer in 2016 and reports one surgery where they were not able to resect the tumor. Oncology f/u with [facility] per care everywhere documents ([place]  per the pt, [facility] per notes from prior admission). She last received chemo approx 1mo ago but it had to be stopped due to her weakness.     Past Medical History:   Diagnosis Date    Biliary obstruction     Cancer     Gallbladder    Cholangiocarcinoma     Constipation     Cyst of breast, left, benign solitary     Diverticulosis     Duodenal mass 5/16/2016    Duodenal stenosis     Helicobacter pylori gastritis     Hiatal hernia     Hx of colonic polyp     Hypertension     Tobacco abuse 10/9/2019       Past Surgical History:   Procedure Laterality Date    APPENDECTOMY      CHOLANGIOGRAM N/A 12/26/2019    Procedure: CHOLANGIOGRAM;  Surgeon: Michelle Surgeon;  Location: [facility];  Service: Anesthesiology;  Laterality: N/A;    colon polyps      COLONOSCOPY      ENDOSCOPIC ULTRASOUND OF UPPER GASTROINTESTINAL TRACT N/A 10/25/2019    Procedure: ULTRASOUND, UPPER GI TRACT, ENDOSCOPIC;  Surgeon: Luis Antonio Lu MD;  Location: [facility];  Service: Endoscopy;  Laterality: N/A;    ERCP N/A 10/21/2019    Procedure: ERCP (ENDOSCOPIC RETROGRADE CHOLANGIOPANCREATOGRAPHY);  Surgeon: Luis Antonio Lu MD;  Location: [facility];  Service: Endoscopy;  Laterality: N/A;    ERCP N/A 12/17/2019    Procedure: ERCP (ENDOSCOPIC RETROGRADE CHOLANGIOPANCREATOGRAPHY);  Surgeon: Monse Rayo MD;  Location: [facility];  Service: Endoscopy;  Laterality: N/A;    ERCP N/A 12/24/2019    Procedure: ERCP (ENDOSCOPIC RETROGRADE CHOLANGIOPANCREATOGRAPHY);  Surgeon: Monse Rayo MD;  Location: [facility] ([address] [place] [address]);  Service: Endoscopy;  Laterality: N/A;  Will need duodenal stent as well as permanent biliary stents (likely 6 x 8 and 6 x 10 epic stents).  Dr Arnav Rayo    ERCP W/ PLASTIC STENT PLACEMENT  10/21/2019    ESOPHAGOGASTRODUODENOSCOPY N/A 10/25/2019    Procedure: EGD (ESOPHAGOGASTRODUODENOSCOPY);  Surgeon: Luis Antonio Lu MD;  Location: [facility];  Service: Endoscopy;  Laterality: N/A;       Review of patient's allergies  indicates:  No Known Allergies    Current Facility-Administered Medications on File Prior to Encounter   Medication    sodium chloride 0.9% flush 10 mL    [DISCONTINUED] 0.9%  NaCl infusion    [DISCONTINUED] HYDROcodone-acetaminophen 5-325 mg per tablet 1 tablet    [DISCONTINUED] HYDROmorphone injection 0.2 mg    [DISCONTINUED] sodium chloride 0.9% flush 10 mL     Current Outpatient Medications on File Prior to Encounter   Medication Sig    oxyCODONE (ROXICODONE) 5 MG immediate release tablet Take 1 tablet (5 mg total) by mouth every 4 (four) hours as needed for Pain.    amLODIPine (NORVASC) 10 MG tablet Take 1 tablet (10 mg total) by mouth once daily.    ammonium lactate 12 % Crea APPLY CREAM TOPICALLY TO AFFECTED AREA TWICE DAILY TO THE FEET THEN APPLY SOCKS ON FOR 3 HOURS FILE HARD SKIN ONCE A WEEK    aspirin 81 MG Chew     dexAMETHasone (DECADRON) 4 MG Tab Take 1/2 tablet by mouth with food in the  morning and 1/2 tablet by mouth with food in the evening.    fentaNYL (DURAGESIC) 50 mcg/hr     omeprazole (PRILOSEC) 20 MG capsule     ondansetron (ZOFRAN-ODT) 8 MG TbDL Take 8 mg by mouth.    oxyCODONE (ROXICODONE) 10 mg Tab immediate release tablet     polyethylene glycol (MIRALAX) 17 gram PwPk Take by mouth.    vitamin D 1000 units Tab Take 185 mg by mouth once daily.     Family History     Problem Relation (Age of Onset)    Pneumonia Mother        Tobacco Use    Smoking status: Former Smoker     Packs/day: 0.50     Years: 42.00     Pack years: 21.00     Types: Cigarettes     Start date: 1/1/1977    Smokeless tobacco: Never Used    Tobacco comment: Pt states that she no longer smokes regularly- only occasionally.   Substance and Sexual Activity    Alcohol use: No    Drug use: No    Sexual activity: Not Currently     Partners: Male     Review of Systems   Constitutional: Positive for activity change, appetite change and chills. Negative for fever.   HENT: Negative for congestion, rhinorrhea  and trouble swallowing.    Eyes: Negative for pain and visual disturbance.   Respiratory: Negative for cough, chest tightness and shortness of breath.    Cardiovascular: Negative for chest pain, palpitations and leg swelling.   Gastrointestinal: Positive for abdominal pain, nausea and vomiting. Negative for abdominal distention, blood in stool, constipation and diarrhea.   Genitourinary: Negative for difficulty urinating, dysuria, flank pain and frequency.   Musculoskeletal: Negative for back pain, gait problem and myalgias.   Neurological: Positive for weakness. Negative for syncope, light-headedness and headaches.   Hematological: Negative for adenopathy.   Psychiatric/Behavioral: Negative for confusion.     Objective:     Vital Signs (Most Recent):  Temp: 98.5 °F (36.9 °C) (02/22/20 1205)  Pulse: 94 (02/22/20 1205)  Resp: 18 (02/22/20 1205)  BP: (!) 151/79 (02/22/20 1205)  SpO2: (!) 94 % (02/22/20 1205) Vital Signs (24h Range):  Temp:  [98 °F (36.7 °C)-98.5 °F (36.9 °C)] 98.5 °F (36.9 °C)  Pulse:  [91-94] 94  Resp:  [16-20] 18  SpO2:  [94 %-98 %] 94 %  BP: (135-156)/(62-85) 151/79        There is no height or weight on file to calculate BMI.    Physical Exam   Constitutional: She is oriented to person, place, and time. She appears well-developed and well-nourished. No distress.   HENT:   Head: Normocephalic and atraumatic.   Dry oral mucosa   Eyes: Scleral icterus (very mildy icteric ) is present.   Neck: Normal range of motion. Neck supple.   Cardiovascular: Normal rate, regular rhythm and normal heart sounds.   Pulmonary/Chest: Effort normal and breath sounds normal. No respiratory distress.   Abdominal: Soft. She exhibits no distension. There is tenderness (RUQ and upper midline/above umbilicus). There is no rebound and no guarding.   Hypoactive bowel sounds upper quadrants, well healed vertical midline incision    Musculoskeletal: She exhibits no edema.   Neurological: She is alert and oriented to person,  place, and time.   Skin: Skin is warm and dry. She is not diaphoretic.   Nursing note and vitals reviewed.          Significant Labs:   Recent Results (from the past 24 hour(s))   CBC auto differential    Collection Time: 02/22/20  5:05 AM   Result Value Ref Range    WBC 19.34 (H) 3.90 - 12.70 K/uL    RBC 4.43 4.00 - 5.40 M/uL    Hemoglobin 13.0 12.0 - 16.0 g/dL    Hematocrit 40.3 37.0 - 48.5 %    Mean Corpuscular Volume 91 82 - 98 fL    Mean Corpuscular Hemoglobin 29.3 27.0 - 31.0 pg    Mean Corpuscular Hemoglobin Conc 32.3 32.0 - 36.0 g/dL    RDW 16.2 (H) 11.5 - 14.5 %    Platelets 246 150 - 350 K/uL    MPV 10.5 9.2 - 12.9 fL    Immature Granulocytes 0.6 (H) 0.0 - 0.5 %    Gran # (ANC) 16.7 (H) 1.8 - 7.7 K/uL    Immature Grans (Abs) 0.11 (H) 0.00 - 0.04 K/uL    Lymph # 1.2 1.0 - 4.8 K/uL    Mono # 1.3 (H) 0.3 - 1.0 K/uL    Eos # 0.0 0.0 - 0.5 K/uL    Baso # 0.04 0.00 - 0.20 K/uL    nRBC 0 0 /100 WBC    Gran% 86.5 (H) 38.0 - 73.0 %    Lymph% 5.9 (L) 18.0 - 48.0 %    Mono% 6.8 4.0 - 15.0 %    Eosinophil% 0.0 0.0 - 8.0 %    Basophil% 0.2 0.0 - 1.9 %    Differential Method Automated    Comprehensive metabolic panel    Collection Time: 02/22/20  5:05 AM   Result Value Ref Range    Sodium 131 (L) 136 - 145 mmol/L    Potassium 3.7 3.5 - 5.1 mmol/L    Chloride 96 95 - 110 mmol/L    CO2 25 23 - 29 mmol/L    Glucose 148 (H) 70 - 110 mg/dL    BUN, Bld 9 8 - 23 mg/dL    Creatinine 0.6 0.5 - 1.4 mg/dL    Calcium 9.2 8.7 - 10.5 mg/dL    Total Protein 7.7 6.0 - 8.4 g/dL    Albumin 3.5 3.5 - 5.2 g/dL    Total Bilirubin 1.3 (H) 0.1 - 1.0 mg/dL    Alkaline Phosphatase 293 (H) 55 - 135 U/L    AST 74 (H) 10 - 40 U/L    ALT 99 (H) 10 - 44 U/L    Anion Gap 10 8 - 16 mmol/L    eGFR if African American >60.0 >60 mL/min/1.73 m^2    eGFR if non African American >60.0 >60 mL/min/1.73 m^2   Lipase    Collection Time: 02/22/20  5:05 AM   Result Value Ref Range    Lipase 354 (H) 4 - 60 U/L   Urinalysis, Reflex to Urine Culture Urine, Clean  Catch    Collection Time: 02/22/20  9:42 AM   Result Value Ref Range    Specimen UA Urine, Clean Catch     Color, UA Yellow Yellow, Straw, Nidhi    Appearance, UA Clear Clear    pH, UA 7.0 5.0 - 8.0    Specific Gravity, UA 1.015 1.005 - 1.030    Protein, UA 1+ (A) Negative    Glucose, UA 1+ (A) Negative    Ketones, UA Negative Negative    Bilirubin (UA) Negative Negative    Occult Blood UA Negative Negative    Nitrite, UA Negative Negative    Leukocytes, UA Negative Negative   Urinalysis Microscopic    Collection Time: 02/22/20  9:42 AM   Result Value Ref Range    RBC, UA 3 0 - 4 /hpf    WBC, UA 2 0 - 5 /hpf    Bacteria Rare None-Occ /hpf    Squam Epithel, UA 1 /hpf    Hyaline Casts, UA 0 0-1/lpf /lpf    Microscopic Comment SEE COMMENT        Significant Imaging: I have reviewed all pertinent imaging results/findings within the past 24 hours.     US Abdomen Limited [[id number]] Resulted: 02/22/20 0606   Order Status: Completed Updated: 02/22/20 0609   Narrative:     EXAMINATION:  US ABDOMEN LIMITED    CLINICAL HISTORY:  ruq pain,  stent placement yesterday;    TECHNIQUE:  Limited ultrasound of the right upper quadrant of the abdomen focused on the biliary system was performed.    COMPARISON:  IR trans hepatic cholangiogram 02/21/2020    FINDINGS:  The pancreas is visualized and is grossly unremarkable.    There is extensive intrahepatic air obscuring the gallbladder status post ERCP with biliary stent placement and IR trans hepatic cholangiogram with internal-external biliary drainage catheter placement.  No convincing evidence of intra or extrahepatic biliary ductal dilatation.  The common bile duct measures 2 mm in maximal thickness.    There is no significant upper abdominal ascites or focal hepatic abnormality.   Impression:       No significant sonographic abnormality identified within the limitations of the study.  Operative changes as above.    Electronically signed by resident: Jaydon Virgen MD  Date:  02/22/2020  Time: 05:56    Electronically signed by: Jorge Eller MD  Date: 02/22/2020  Time: 06:06         Assessment/Plan:     Hyponatremia  Mild hyponatremia on admission, 131. Likely hypovolemic 2/2 poor po intake and multiple episodes of vomiting. Asymptomatic.    - Trend daily       Vitamin D deficiency  - Continue home vit D supplementation      Discharge planning issues  - PT/OT consulted for discharge planning  - dispo pending improvement in pain and down trending labs       Prolonged Q-T interval on ECG  QTc on . No cardiac or arrhythmia hx. Asymptomatic. On prn ODT Zpfran 8mg outpatient without issue.    - low dose prn zofran, will be mindful of QT prolonging effects of anti-emetics  - Daily EKG    Cancer related pain  Home pain med regimen includes Fentanyl patch 50mcg q72hrs and prn oxycodone (5-15mg depending on pain). New fentanyl patch placed 2/21 after IR procedure, and pt has the patch on upon admission. Oxycodone at home was not effective for current pain. IV Dilaudid 0.5mg given in the ED with relief.    - Continue home fentanyl patch q72h, next due to be changed Monday 2/24  - PRN IV Dilaudid 0.5mg q6h (will hold home oxycodone for now, as it was ineffective for acute increased pain prior to admission)  - Bowel regimen    Cholangitis  73yo F with advanced stage IIIB gallbladder cancer initially diagnosed in 2016 s/p chemo and radiation and complicated by biliary obstruction who presents on 2/22/20 for abd pain, nausea, and vomiting, following IR transhepatic biliary stent exchange on 2/21/20 (she had PTC drain in place). Afebrile, but with new leukocytosis, elevated LFTs, T bili, and lipase. DDx includes cholangitis vs pancreatitis. Due to recent instrumentation with attempt to internalize biliary drainage, cholangitis is most likely. There may be mild pancreatic inflammation, but primary problem most likely cholangitis.     -  Continue ABX - Cipro and Flagyl  - Blood cultures  pending  - Primary team spoke to IR on admission and they recommended opening the stopcock of exterior biliary drain, as trial of internal drainage clearly failed --they rec continue cholangitis treatment and discussing with the pt that in approx 1wk/after completion of ABX, at home pt can attempt a trial of interior drainage by turning off exterior drain   - CBC, CMP daily - trend abnormalities   - treat pain - see 'cancer related pain' for pain management plan  - prn anti-emetics, mindful of slightly prolonged QTc  - clear liquid diet as tolerated - will advance slowly (no need for NPO from IR perspective)  - Low rate maintenance IVF x12 hrs due to decreased po intake  - Continue home PPI  - If clinical decline or failure to improve, will place official IR consult and obtain additional imaging (CT abd/pelvis w/ contrast)    HTN (hypertension)  Previously on Amlodipine 10mg, but recently stopped by her PCP due to normal BP on home monitoring. Normotensive on admission.    - Continue to monitor, no need for anti-HTN presently      Gallbladder cancer  Stage IIIB gallbladder cancer on palliative chemo with [id number] at [facility], last chemo approx 1mo ago. Pt reports chemo was stopped 2/2 her weakness/debility.    - Lovenox VTE PPX  - f/u with outpatient Oncologist upon discharge         VTE Risk Mitigation (From admission, onward)         Ordered     enoxaparin injection 40 mg  Daily      02/22/20 0700     IP VTE HIGH RISK PATIENT  Once      02/22/20 0700                   Dahiana Isaacs MD  Department of Hospital Medicine   [facility]

## 2020-02-22 NOTE — TELEPHONE ENCOUNTER
Reason for Disposition   Shock suspected (e.g., cold/pale/clammy skin, too weak to stand, low BP, rapid pulse)    Protocols used: ST VOMITING-A-AH    Weakness since gallbladder stent surgery. She cannot stop wretching and she vomited 4 times today. Her  states her vomitus looks like what drained out into her bag before her surgery today. Patient sounds really weak and states she has severe abdominal pain. She was advised to call 911. She verbalized understanding.

## 2020-02-22 NOTE — ASSESSMENT & PLAN NOTE
Home pain med regimen includes Fentanyl patch 50mcg q72hrs and prn oxycodone (5-15mg depending on pain). New fentanyl patch placed 2/21 after IR procedure, and pt has the patch on upon admission. Oxycodone at home was not effective for current pain. IV Dilaudid 0.5mg given in the ED with relief.    - Continue home fentanyl patch q72h, next due to be changed Monday 2/24  - PRN IV Dilaudid 0.5mg q6h (will hold home oxycodone for now, as it was ineffective for acute increased pain prior to admission)  - Bowel regimen

## 2020-02-22 NOTE — ASSESSMENT & PLAN NOTE
Previously on Amlodipine 10mg, but recently stopped by her PCP due to normal BP on home monitoring. Normotensive on admission.    - Continue to monitor, no need for anti-HTN presently

## 2020-02-22 NOTE — SUBJECTIVE & OBJECTIVE
Past Medical History:   Diagnosis Date    Biliary obstruction     Cancer     Gallbladder    Cholangiocarcinoma     Constipation     Cyst of breast, left, benign solitary     Diverticulosis     Duodenal mass 5/16/2016    Duodenal stenosis     Helicobacter pylori gastritis     Hiatal hernia     Hx of colonic polyp     Hypertension     Tobacco abuse 10/9/2019       Past Surgical History:   Procedure Laterality Date    APPENDECTOMY      CHOLANGIOGRAM N/A 12/26/2019    Procedure: CHOLANGIOGRAM;  Surgeon: Michelle Surgeon;  Location: [facility];  Service: Anesthesiology;  Laterality: N/A;    colon polyps      COLONOSCOPY      ENDOSCOPIC ULTRASOUND OF UPPER GASTROINTESTINAL TRACT N/A 10/25/2019    Procedure: ULTRASOUND, UPPER GI TRACT, ENDOSCOPIC;  Surgeon: Luis Antonio Lu MD;  Location: [facility];  Service: Endoscopy;  Laterality: N/A;    ERCP N/A 10/21/2019    Procedure: ERCP (ENDOSCOPIC RETROGRADE CHOLANGIOPANCREATOGRAPHY);  Surgeon: Luis Antonio Lu MD;  Location: [facility];  Service: Endoscopy;  Laterality: N/A;    ERCP N/A 12/17/2019    Procedure: ERCP (ENDOSCOPIC RETROGRADE CHOLANGIOPANCREATOGRAPHY);  Surgeon: Monse Rayo MD;  Location: [facility];  Service: Endoscopy;  Laterality: N/A;    ERCP N/A 12/24/2019    Procedure: ERCP (ENDOSCOPIC RETROGRADE CHOLANGIOPANCREATOGRAPHY);  Surgeon: Monse Rayo MD;  Location: [address]);  Service: Endoscopy;  Laterality: N/A;  Will need duodenal stent as well as permanent biliary stents (likely 6 x 8 and 6 x 10 epic stents).  Dr Arnva Rayo    ERCP W/ PLASTIC STENT PLACEMENT  10/21/2019    ESOPHAGOGASTRODUODENOSCOPY N/A 10/25/2019    Procedure: EGD (ESOPHAGOGASTRODUODENOSCOPY);  Surgeon: Luis Antonio Lu MD;  Location: [facility];  Service: Endoscopy;  Laterality: N/A;       Review of patient's allergies indicates:  No Known Allergies    Current Facility-Administered Medications on File Prior to Encounter   Medication    sodium chloride 0.9%  flush 10 mL    [DISCONTINUED] 0.9%  NaCl infusion    [DISCONTINUED] HYDROcodone-acetaminophen 5-325 mg per tablet 1 tablet    [DISCONTINUED] HYDROmorphone injection 0.2 mg    [DISCONTINUED] sodium chloride 0.9% flush 10 mL     Current Outpatient Medications on File Prior to Encounter   Medication Sig    oxyCODONE (ROXICODONE) 5 MG immediate release tablet Take 1 tablet (5 mg total) by mouth every 4 (four) hours as needed for Pain.    amLODIPine (NORVASC) 10 MG tablet Take 1 tablet (10 mg total) by mouth once daily.    ammonium lactate 12 % Crea APPLY CREAM TOPICALLY TO AFFECTED AREA TWICE DAILY TO THE FEET THEN APPLY SOCKS ON FOR 3 HOURS FILE HARD SKIN ONCE A WEEK    aspirin 81 MG Chew     dexAMETHasone (DECADRON) 4 MG Tab Take 1/2 tablet by mouth with food in the  morning and 1/2 tablet by mouth with food in the evening.    fentaNYL (DURAGESIC) 50 mcg/hr     omeprazole (PRILOSEC) 20 MG capsule     ondansetron (ZOFRAN-ODT) 8 MG TbDL Take 8 mg by mouth.    oxyCODONE (ROXICODONE) 10 mg Tab immediate release tablet     polyethylene glycol (MIRALAX) 17 gram PwPk Take by mouth.    vitamin D 1000 units Tab Take 185 mg by mouth once daily.     Family History     Problem Relation (Age of Onset)    Pneumonia Mother        Tobacco Use    Smoking status: Former Smoker     Packs/day: 0.50     Years: 42.00     Pack years: 21.00     Types: Cigarettes     Start date: 1/1/1977    Smokeless tobacco: Never Used    Tobacco comment: Pt states that she no longer smokes regularly- only occasionally.   Substance and Sexual Activity    Alcohol use: No    Drug use: No    Sexual activity: Not Currently     Partners: Male     Review of Systems   Constitutional: Positive for activity change, appetite change and chills. Negative for fever.   HENT: Negative for congestion, rhinorrhea and trouble swallowing.    Eyes: Negative for pain and visual disturbance.   Respiratory: Negative for cough, chest tightness and shortness of  breath.    Cardiovascular: Negative for chest pain, palpitations and leg swelling.   Gastrointestinal: Positive for abdominal pain, nausea and vomiting. Negative for abdominal distention, blood in stool, constipation and diarrhea.   Genitourinary: Negative for difficulty urinating, dysuria, flank pain and frequency.   Musculoskeletal: Negative for back pain, gait problem and myalgias.   Neurological: Positive for weakness. Negative for syncope, light-headedness and headaches.   Hematological: Negative for adenopathy.   Psychiatric/Behavioral: Negative for confusion.     Objective:     Vital Signs (Most Recent):  Temp: 98.5 °F (36.9 °C) (02/22/20 1205)  Pulse: 94 (02/22/20 1205)  Resp: 18 (02/22/20 1205)  BP: (!) 151/79 (02/22/20 1205)  SpO2: (!) 94 % (02/22/20 1205) Vital Signs (24h Range):  Temp:  [98 °F (36.7 °C)-98.5 °F (36.9 °C)] 98.5 °F (36.9 °C)  Pulse:  [91-94] 94  Resp:  [16-20] 18  SpO2:  [94 %-98 %] 94 %  BP: (135-156)/(62-85) 151/79        There is no height or weight on file to calculate BMI.    Physical Exam   Constitutional: She is oriented to person, place, and time. She appears well-developed and well-nourished. No distress.   HENT:   Head: Normocephalic and atraumatic.   Dry oral mucosa   Eyes: Scleral icterus (very mildy icteric ) is present.   Neck: Normal range of motion. Neck supple.   Cardiovascular: Normal rate, regular rhythm and normal heart sounds.   Pulmonary/Chest: Effort normal and breath sounds normal. No respiratory distress.   Abdominal: Soft. She exhibits no distension. There is tenderness (RUQ and upper midline/above umbilicus). There is no rebound and no guarding.   Hypoactive bowel sounds upper quadrants, well healed vertical midline incision    Musculoskeletal: She exhibits no edema.   Neurological: She is alert and oriented to person, place, and time.   Skin: Skin is warm and dry. She is not diaphoretic.   Nursing note and vitals reviewed.          Significant Labs:   Recent  Results (from the past 24 hour(s))   CBC auto differential    Collection Time: 02/22/20  5:05 AM   Result Value Ref Range    WBC 19.34 (H) 3.90 - 12.70 K/uL    RBC 4.43 4.00 - 5.40 M/uL    Hemoglobin 13.0 12.0 - 16.0 g/dL    Hematocrit 40.3 37.0 - 48.5 %    Mean Corpuscular Volume 91 82 - 98 fL    Mean Corpuscular Hemoglobin 29.3 27.0 - 31.0 pg    Mean Corpuscular Hemoglobin Conc 32.3 32.0 - 36.0 g/dL    RDW 16.2 (H) 11.5 - 14.5 %    Platelets 246 150 - 350 K/uL    MPV 10.5 9.2 - 12.9 fL    Immature Granulocytes 0.6 (H) 0.0 - 0.5 %    Gran # (ANC) 16.7 (H) 1.8 - 7.7 K/uL    Immature Grans (Abs) 0.11 (H) 0.00 - 0.04 K/uL    Lymph # 1.2 1.0 - 4.8 K/uL    Mono # 1.3 (H) 0.3 - 1.0 K/uL    Eos # 0.0 0.0 - 0.5 K/uL    Baso # 0.04 0.00 - 0.20 K/uL    nRBC 0 0 /100 WBC    Gran% 86.5 (H) 38.0 - 73.0 %    Lymph% 5.9 (L) 18.0 - 48.0 %    Mono% 6.8 4.0 - 15.0 %    Eosinophil% 0.0 0.0 - 8.0 %    Basophil% 0.2 0.0 - 1.9 %    Differential Method Automated    Comprehensive metabolic panel    Collection Time: 02/22/20  5:05 AM   Result Value Ref Range    Sodium 131 (L) 136 - 145 mmol/L    Potassium 3.7 3.5 - 5.1 mmol/L    Chloride 96 95 - 110 mmol/L    CO2 25 23 - 29 mmol/L    Glucose 148 (H) 70 - 110 mg/dL    BUN, Bld 9 8 - 23 mg/dL    Creatinine 0.6 0.5 - 1.4 mg/dL    Calcium 9.2 8.7 - 10.5 mg/dL    Total Protein 7.7 6.0 - 8.4 g/dL    Albumin 3.5 3.5 - 5.2 g/dL    Total Bilirubin 1.3 (H) 0.1 - 1.0 mg/dL    Alkaline Phosphatase 293 (H) 55 - 135 U/L    AST 74 (H) 10 - 40 U/L    ALT 99 (H) 10 - 44 U/L    Anion Gap 10 8 - 16 mmol/L    eGFR if African American >60.0 >60 mL/min/1.73 m^2    eGFR if non African American >60.0 >60 mL/min/1.73 m^2   Lipase    Collection Time: 02/22/20  5:05 AM   Result Value Ref Range    Lipase 354 (H) 4 - 60 U/L   Urinalysis, Reflex to Urine Culture Urine, Clean Catch    Collection Time: 02/22/20  9:42 AM   Result Value Ref Range    Specimen UA Urine, Clean Catch     Color, UA Yellow Yellow, Straw, Nidhi     Appearance, UA Clear Clear    pH, UA 7.0 5.0 - 8.0    Specific Gravity, UA 1.015 1.005 - 1.030    Protein, UA 1+ (A) Negative    Glucose, UA 1+ (A) Negative    Ketones, UA Negative Negative    Bilirubin (UA) Negative Negative    Occult Blood UA Negative Negative    Nitrite, UA Negative Negative    Leukocytes, UA Negative Negative   Urinalysis Microscopic    Collection Time: 02/22/20  9:42 AM   Result Value Ref Range    RBC, UA 3 0 - 4 /hpf    WBC, UA 2 0 - 5 /hpf    Bacteria Rare None-Occ /hpf    Squam Epithel, UA 1 /hpf    Hyaline Casts, UA 0 0-1/lpf /lpf    Microscopic Comment SEE COMMENT        Significant Imaging: I have reviewed all pertinent imaging results/findings within the past 24 hours.     US Abdomen Limited [[id number]] Resulted: 02/22/20 0606   Order Status: Completed Updated: 02/22/20 0609   Narrative:     EXAMINATION:  US ABDOMEN LIMITED    CLINICAL HISTORY:  ruq pain,  stent placement yesterday;    TECHNIQUE:  Limited ultrasound of the right upper quadrant of the abdomen focused on the biliary system was performed.    COMPARISON:  IR trans hepatic cholangiogram 02/21/2020    FINDINGS:  The pancreas is visualized and is grossly unremarkable.    There is extensive intrahepatic air obscuring the gallbladder status post ERCP with biliary stent placement and IR trans hepatic cholangiogram with internal-external biliary drainage catheter placement.  No convincing evidence of intra or extrahepatic biliary ductal dilatation.  The common bile duct measures 2 mm in maximal thickness.    There is no significant upper abdominal ascites or focal hepatic abnormality.   Impression:       No significant sonographic abnormality identified within the limitations of the study.  Operative changes as above.    Electronically signed by resident: Jaydon Virgen MD  Date: 02/22/2020  Time: 05:56    Electronically signed by: Jorge Eller MD  Date: 02/22/2020  Time: 06:06

## 2020-02-22 NOTE — ASSESSMENT & PLAN NOTE
73yo F with advanced stage IIIB gallbladder cancer initially diagnosed in 2016 s/p chemo and radiation and complicated by biliary obstruction who presents on 2/22/20 for abd pain, nausea, and vomiting, following IR transhepatic biliary stent exchange on 2/21/20 (she had PTC drain in place). Afebrile, but with new leukocytosis, elevated LFTs, T bili, and lipase. DDx includes cholangitis vs pancreatitis. Due to recent instrumentation with attempt to internalize biliary drainage, cholangitis is most likely. There may be mild pancreatic inflammation, but primary problem most likely cholangitis.     -  Continue ABX - Cipro and Flagyl  - Blood cultures pending  - Primary team spoke to IR on admission and they recommended opening the stopcock of exterior biliary drain, as trial of internal drainage clearly failed --they rec continue cholangitis treatment and discussing with the pt that in approx 1wk/after completion of ABX, at home pt can attempt a trial of interior drainage by turning off exterior drain   - CBC, CMP daily - trend abnormalities   - treat pain - see 'cancer related pain' for pain management plan  - prn anti-emetics, mindful of slightly prolonged QTc  - clear liquid diet as tolerated - will advance slowly (no need for NPO from IR perspective)  - Low rate maintenance IVF x12 hrs due to decreased po intake  - Continue home PPI  - If clinical decline or failure to improve, will place official IR consult and obtain additional imaging (CT abd/pelvis w/ contrast)

## 2020-02-22 NOTE — HOSPITAL COURSE
Pt admitted for leukocytosis, hyponatremia, & elevated LFTs & T bili. US abdomen showed no biliary duct dilation. Pt started on cipro & flagyl for possible cholangitis. IR had turned off exterior biliary drain on 2/21 in an attempt to trial internal drainage but was unsucessful- consulted IR again & they reopened external drain. Per IR, there is concern that the cholangitis on presentation was due to possible stent manipulation and not failure of the stent. Cholangiogram on 2/26 showed patent stent but pre-existing biliary drain was difficult to aspirate/flush so IR exchanged for a new 10.2-Fr external biliary drainage catheter. Pt tolerated procedure well & is stable for discharge on 2/27.

## 2020-02-22 NOTE — ED PROVIDER NOTES
Encounter Date: 2/22/2020       History     Chief Complaint   Patient presents with    Abdominal Pain     cholangiogram today     Patient is a 73yo woman with locally advanced stage IIIB gallbladder cancer initially diagnosed in 2016 s/p chemo and radiation is presenting to the ER for evaluation of abdominal pain. Patient reports that she underwent IR Biliary Transhepatic cholangiogram yesterday.  Patient reports that her biliary drain was turned off.  Patient reports that the drain was supposed to be discontinued for 1 week.   Patient reports that since her discharge she has had increasing pain to the right upper quadrant.  She also complains of multiple episodes of vomiting. Patient reports she has a fentanyl patch but did take half of an OxyContin with no alleviation of her pain.  Patient did take a diabetic with slight alleviation of her symptoms. No fevers or chills at home.  She denies any cough congestion, URI symptoms. No diarrhea.  No UTI symptoms including dysuria hematuria.    Patient reports that she is followed by [facility].    The history is provided by the patient.     Review of patient's allergies indicates:  No Known Allergies  Past Medical History:   Diagnosis Date    Biliary obstruction     Cancer     Gallbladder    Cholangiocarcinoma     Constipation     Cyst of breast, left, benign solitary     Diverticulosis     Duodenal mass 5/16/2016    Duodenal stenosis     Helicobacter pylori gastritis     Hiatal hernia     Hx of colonic polyp     Hypertension     Tobacco abuse 10/9/2019     Past Surgical History:   Procedure Laterality Date    APPENDECTOMY      CHOLANGIOGRAM N/A 12/26/2019    Procedure: CHOLANGIOGRAM;  Surgeon: Michelle Surgeon;  Location: [facility];  Service: Anesthesiology;  Laterality: N/A;    colon polyps      COLONOSCOPY      ENDOSCOPIC ULTRASOUND OF UPPER GASTROINTESTINAL TRACT N/A 10/25/2019    Procedure: ULTRASOUND, UPPER GI TRACT,  ENDOSCOPIC;  Surgeon: Luis Antonio Lu MD;  Location: [facility];  Service: Endoscopy;  Laterality: N/A;    ERCP N/A 10/21/2019    Procedure: ERCP (ENDOSCOPIC RETROGRADE CHOLANGIOPANCREATOGRAPHY);  Surgeon: Luis Antonio Lu MD;  Location: [facility];  Service: Endoscopy;  Laterality: N/A;    ERCP N/A 12/17/2019    Procedure: ERCP (ENDOSCOPIC RETROGRADE CHOLANGIOPANCREATOGRAPHY);  Surgeon: Monse Rayo MD;  Location: [facility];  Service: Endoscopy;  Laterality: N/A;    ERCP N/A 12/24/2019    Procedure: ERCP (ENDOSCOPIC RETROGRADE CHOLANGIOPANCREATOGRAPHY);  Surgeon: Monse Rayo MD;  [facility] ([address] [place] [address]);  Service: Endoscopy;  Laterality: N/A;  Will need duodenal stent as well as permanent biliary stents (likely 6 x 8 and 6 x 10 epic stents).  Dr Arnav Rayo    ERCP W/ PLASTIC STENT PLACEMENT  10/21/2019    ESOPHAGOGASTRODUODENOSCOPY N/A 10/25/2019    Procedure: EGD (ESOPHAGOGASTRODUODENOSCOPY);  Surgeon: Luis Antonio Lu MD;  Location: [facility];  Service: Endoscopy;  Laterality: N/A;     Family History   Problem Relation Age of Onset    Pneumonia Mother      Social History     Tobacco Use    Smoking status: Former Smoker     Packs/day: 0.50     Years: 42.00     Pack years: 21.00     Types: Cigarettes     Start date: 1/1/1977    Smokeless tobacco: Never Used    Tobacco comment: Pt states that she no longer smokes regularly- only occasionally.   Substance Use Topics    Alcohol use: No    Drug use: No     Review of Systems   Constitutional: Negative for chills and fever.   HENT: Negative for congestion.    Respiratory: Negative for cough and shortness of breath.    Cardiovascular: Negative for chest pain and palpitations.   Gastrointestinal: Positive for abdominal pain, nausea and vomiting. Negative for abdominal distention, constipation and diarrhea.   Genitourinary: Negative for dysuria, flank pain, hematuria, pelvic pain, vaginal bleeding and vaginal discharge.   Musculoskeletal: Negative  for myalgias.   Skin: Negative for rash.   Allergic/Immunologic: Negative for immunocompromised state.   Neurological: Negative for weakness.   Hematological: Does not bruise/bleed easily.   Psychiatric/Behavioral: Negative for confusion.       Physical Exam     Initial Vitals [02/22/20 0441]   BP Pulse Resp Temp SpO2   (!) 154/78 92 16 98.3 °F (36.8 °C) 98 %      MAP       --         Physical Exam    Vitals reviewed.  Constitutional: She appears well-developed and well-nourished. She is not diaphoretic. No distress.   HENT:   Head: Normocephalic and atraumatic.   Mouth/Throat: Oropharynx is clear and moist.   Eyes: Conjunctivae and EOM are normal. Pupils are equal, round, and reactive to light.   Neck: Neck supple.   Cardiovascular: Normal rate, regular rhythm, normal heart sounds and intact distal pulses.   Pulmonary/Chest: Breath sounds normal.   Abdominal: Soft. Normal appearance. She exhibits no distension. There is tenderness in the right upper quadrant and epigastric area. There is no rigidity, no rebound, no guarding and no CVA tenderness.       Neurological: She is alert and oriented to person, place, and time.   Skin: Skin is warm and dry.         ED Course   Procedures  Labs Reviewed   CBC W/ AUTO DIFFERENTIAL - Abnormal; Notable for the following components:       Result Value    WBC 19.34 (*)     RDW 16.2 (*)     Immature Granulocytes 0.6 (*)     Gran # (ANC) 16.7 (*)     Immature Grans (Abs) 0.11 (*)     Mono # 1.3 (*)     Gran% 86.5 (*)     Lymph% 5.9 (*)     All other components within normal limits   COMPREHENSIVE METABOLIC PANEL - Abnormal; Notable for the following components:    Sodium 131 (*)     Glucose 148 (*)     Total Bilirubin 1.3 (*)     Alkaline Phosphatase 293 (*)     AST 74 (*)     ALT 99 (*)     All other components within normal limits   LIPASE - Abnormal; Notable for the following components:    Lipase 354 (*)     All other components within normal limits   CULTURE, BLOOD   CULTURE,  BLOOD   URINALYSIS, REFLEX TO URINE CULTURE          Imaging Results          US Abdomen Limited (Final result)  Result time 02/22/20 06:06:36    Final result by Jorge Eller MD (02/22/20 06:06:36)                 Impression:      No significant sonographic abnormality identified within the limitations of the study.  Operative changes as above.    Electronically signed by resident: Jaydon Virgen MD  Date:    02/22/2020  Time:    05:56    Electronically signed by: Jorge Eller MD  Date:    02/22/2020  Time:    06:06             Narrative:    EXAMINATION:  US ABDOMEN LIMITED    CLINICAL HISTORY:  ruq pain,  stent placement yesterday;    TECHNIQUE:  Limited ultrasound of the right upper quadrant of the abdomen focused on the biliary system was performed.    COMPARISON:  IR trans hepatic cholangiogram 02/21/2020    FINDINGS:  The pancreas is visualized and is grossly unremarkable.    There is extensive intrahepatic air obscuring the gallbladder status post ERCP with biliary stent placement and IR trans hepatic cholangiogram with internal-external biliary drainage catheter placement.  No convincing evidence of intra or extrahepatic biliary ductal dilatation.  The common bile duct measures 2 mm in maximal thickness.    There is no significant upper abdominal ascites or focal hepatic abnormality.                                       APC / Resident Notes:           Patient was seen in the ER promptly upon arrival.  She is afebrile, no acute distress.  Physical examination reveals tenderness on palpation to the right upper quadrant of the abdomen.  She also has some tenderness to the epigastric region.  Abdomen is otherwise soft, nondistended.  There is a biliary drain to present in the right upper quadrant.    Chart review reveals that patient underwent IR biliary drain replacement with stent removal and stent placement yesterday.  The catheter was also replaced.         Attending Attestation:              Attending ED Notes:   Attending note:  The patient has leukocytosis with right upper quadrant pain and elevation of her lipase and bilirubin.  This is concerning for cholangitis or pancreatitis after her recent procedure.  I will treat with IV Cipro and Flagyl.  I discussed the case with hospital medicine and she will be admitted.                        Clinical Impression:       ICD-10-CM ICD-9-CM   1. Abdominal pain, unspecified abdominal location R10.9 789.00   2. Cholangitis K83.09 576.1   3. Acute pancreatitis, unspecified complication status, unspecified pancreatitis type K85.90 577.0                             Jose Luis Julio MD  02/22/20 0701

## 2020-02-22 NOTE — ED NOTES
Pt is resting comfortably on a stretcher. NAD noted, VSS. BP cuff and pulse ox alarms are set. Bed low and locked, side rails up x2. Call light within reach of pt. Pt and family updated on POC. Will continue to monitor.

## 2020-02-22 NOTE — HPI
Ms. Tomlinson is a 73yo F with HTN (on Amlodipine previously, but stopped by PCP 2/2 low/normal BP) and advanced stage IIIB gallbladder cancer initially diagnosed in 2016 s/p chemo and radiation and complicated by biliary obstruction who presents on 2/22/20 for abd pain, nausea, and vomiting, following IR transhepatic biliary stent exchange on 2/21/20 (she had PTC drain in place). At time of procedure, trial of internal drainage by turning off external drain. Procedure was uncomplicated and she was d/c same day. Since getting home from the procedure, she reports abdominal pain (worse than normal cancer-related pain) N/V, with multiple episodes of bilious, non-bloody emesis. Denies fevers but reports chills. Denies CP, palpitations, SOB, diarrhea. Last BM was Wednesday. She takes occasional prn ODT Zofran at home with moderate relief in nausea (although she has not taken it since recent procedure). Her usual pain med regimen includes fentanyl patch q72h (current patch was applied 2/21) and po oxycodone. Her normal pain regimen has not been sufficient for her current abd pain.    She was admitted in 12/2019 with similar presentation and was treated for cholangitis, discharged on augmentin. She was diagnosed with gallbladder cancer in 2016 and reports one surgery where they were not able to resect the tumor. Oncology f/u with [facility] per care everywhere documents (Brien Villaseñor per the pt, [facility] per notes from prior admission). She last received chemo approx 1mo ago but it had to be stopped due to her weakness.

## 2020-02-22 NOTE — ASSESSMENT & PLAN NOTE
Stage IIIB gallbladder cancer on palliative chemo with [id number] at [facility], last chemo approx 1mo ago. Pt reports chemo was stopped 2/2 her weakness/debility.    - Lovenox VTE PPX  - f/u with outpatient Oncologist upon discharge

## 2020-02-23 LAB
ALBUMIN SERPL BCP-MCNC: 2.5 G/DL (ref 3.5–5.2)
ALP SERPL-CCNC: 247 U/L (ref 55–135)
ALT SERPL W/O P-5'-P-CCNC: 58 U/L (ref 10–44)
ANION GAP SERPL CALC-SCNC: 9 MMOL/L (ref 8–16)
AST SERPL-CCNC: 37 U/L (ref 10–40)
BASOPHILS # BLD AUTO: 0.04 K/UL (ref 0–0.2)
BASOPHILS NFR BLD: 0.3 % (ref 0–1.9)
BILIRUB SERPL-MCNC: 0.8 MG/DL (ref 0.1–1)
BUN SERPL-MCNC: 10 MG/DL (ref 8–23)
CALCIUM SERPL-MCNC: 8.2 MG/DL (ref 8.7–10.5)
CHLORIDE SERPL-SCNC: 99 MMOL/L (ref 95–110)
CO2 SERPL-SCNC: 26 MMOL/L (ref 23–29)
CREAT SERPL-MCNC: 0.6 MG/DL (ref 0.5–1.4)
DIFFERENTIAL METHOD: ABNORMAL
EOSINOPHIL # BLD AUTO: 0 K/UL (ref 0–0.5)
EOSINOPHIL NFR BLD: 0.1 % (ref 0–8)
ERYTHROCYTE [DISTWIDTH] IN BLOOD BY AUTOMATED COUNT: 16.2 % (ref 11.5–14.5)
EST. GFR  (AFRICAN AMERICAN): >60 ML/MIN/1.73 M^2
EST. GFR  (NON AFRICAN AMERICAN): >60 ML/MIN/1.73 M^2
GLUCOSE SERPL-MCNC: 91 MG/DL (ref 70–110)
HCT VFR BLD AUTO: 38.1 % (ref 37–48.5)
HGB BLD-MCNC: 12.2 G/DL (ref 12–16)
IMM GRANULOCYTES # BLD AUTO: 0.05 K/UL (ref 0–0.04)
IMM GRANULOCYTES NFR BLD AUTO: 0.4 % (ref 0–0.5)
LYMPHOCYTES # BLD AUTO: 1.3 K/UL (ref 1–4.8)
LYMPHOCYTES NFR BLD: 10.7 % (ref 18–48)
MAGNESIUM SERPL-MCNC: 1.4 MG/DL (ref 1.6–2.6)
MCH RBC QN AUTO: 29.4 PG (ref 27–31)
MCHC RBC AUTO-ENTMCNC: 32 G/DL (ref 32–36)
MCV RBC AUTO: 92 FL (ref 82–98)
MONOCYTES # BLD AUTO: 1.5 K/UL (ref 0.3–1)
MONOCYTES NFR BLD: 12.2 % (ref 4–15)
NEUTROPHILS # BLD AUTO: 9.2 K/UL (ref 1.8–7.7)
NEUTROPHILS NFR BLD: 76.3 % (ref 38–73)
NRBC BLD-RTO: 0 /100 WBC
PHOSPHATE SERPL-MCNC: 2.9 MG/DL (ref 2.7–4.5)
PLATELET # BLD AUTO: 249 K/UL (ref 150–350)
PMV BLD AUTO: 10.5 FL (ref 9.2–12.9)
POTASSIUM SERPL-SCNC: 3.3 MMOL/L (ref 3.5–5.1)
PROT SERPL-MCNC: 6.1 G/DL (ref 6–8.4)
RBC # BLD AUTO: 4.15 M/UL (ref 4–5.4)
SODIUM SERPL-SCNC: 134 MMOL/L (ref 136–145)
WBC # BLD AUTO: 12.03 K/UL (ref 3.9–12.7)

## 2020-02-23 PROCEDURE — 99233 PR SUBSEQUENT HOSPITAL CARE,LEVL III: ICD-10-PCS | Mod: HCNC,GC,, | Performed by: HOSPITALIST

## 2020-02-23 PROCEDURE — 63600175 PHARM REV CODE 636 W HCPCS: Mod: HCNC | Performed by: EMERGENCY MEDICINE

## 2020-02-23 PROCEDURE — 11000001 HC ACUTE MED/SURG PRIVATE ROOM: Mod: HCNC

## 2020-02-23 PROCEDURE — 83735 ASSAY OF MAGNESIUM: CPT | Mod: HCNC

## 2020-02-23 PROCEDURE — 93005 ELECTROCARDIOGRAM TRACING: CPT | Mod: HCNC

## 2020-02-23 PROCEDURE — 97165 OT EVAL LOW COMPLEX 30 MIN: CPT | Mod: HCNC

## 2020-02-23 PROCEDURE — 94761 N-INVAS EAR/PLS OXIMETRY MLT: CPT | Mod: HCNC

## 2020-02-23 PROCEDURE — 97535 SELF CARE MNGMENT TRAINING: CPT | Mod: HCNC

## 2020-02-23 PROCEDURE — 93010 ELECTROCARDIOGRAM REPORT: CPT | Mod: HCNC,,, | Performed by: INTERNAL MEDICINE

## 2020-02-23 PROCEDURE — 80053 COMPREHEN METABOLIC PANEL: CPT | Mod: HCNC

## 2020-02-23 PROCEDURE — 36415 COLL VENOUS BLD VENIPUNCTURE: CPT | Mod: HCNC

## 2020-02-23 PROCEDURE — 25000003 PHARM REV CODE 250: Mod: HCNC | Performed by: STUDENT IN AN ORGANIZED HEALTH CARE EDUCATION/TRAINING PROGRAM

## 2020-02-23 PROCEDURE — S0030 INJECTION, METRONIDAZOLE: HCPCS | Mod: HCNC | Performed by: STUDENT IN AN ORGANIZED HEALTH CARE EDUCATION/TRAINING PROGRAM

## 2020-02-23 PROCEDURE — 99233 SBSQ HOSP IP/OBS HIGH 50: CPT | Mod: HCNC,GC,, | Performed by: HOSPITALIST

## 2020-02-23 PROCEDURE — 63600175 PHARM REV CODE 636 W HCPCS: Mod: HCNC | Performed by: STUDENT IN AN ORGANIZED HEALTH CARE EDUCATION/TRAINING PROGRAM

## 2020-02-23 PROCEDURE — 93010 EKG 12-LEAD: ICD-10-PCS | Mod: HCNC,,, | Performed by: INTERNAL MEDICINE

## 2020-02-23 PROCEDURE — 85025 COMPLETE CBC W/AUTO DIFF WBC: CPT | Mod: HCNC

## 2020-02-23 PROCEDURE — 84100 ASSAY OF PHOSPHORUS: CPT | Mod: HCNC

## 2020-02-23 RX ORDER — AMOXICILLIN AND CLAVULANATE POTASSIUM 875; 125 MG/1; MG/1
1 TABLET, FILM COATED ORAL 2 TIMES DAILY
Status: DISCONTINUED | OUTPATIENT
Start: 2020-02-23 | End: 2020-02-27 | Stop reason: HOSPADM

## 2020-02-23 RX ORDER — POLYETHYLENE GLYCOL 3350 17 G/17G
17 POWDER, FOR SOLUTION ORAL DAILY
Status: DISCONTINUED | OUTPATIENT
Start: 2020-02-24 | End: 2020-02-27 | Stop reason: HOSPADM

## 2020-02-23 RX ORDER — POTASSIUM CHLORIDE 20 MEQ/1
40 TABLET, EXTENDED RELEASE ORAL ONCE
Status: COMPLETED | OUTPATIENT
Start: 2020-02-23 | End: 2020-02-23

## 2020-02-23 RX ORDER — OXYCODONE HYDROCHLORIDE 5 MG/1
5 TABLET ORAL EVERY 6 HOURS PRN
Status: DISCONTINUED | OUTPATIENT
Start: 2020-02-23 | End: 2020-02-27 | Stop reason: HOSPADM

## 2020-02-23 RX ORDER — AMOXICILLIN 250 MG
1 CAPSULE ORAL 2 TIMES DAILY
Status: DISCONTINUED | OUTPATIENT
Start: 2020-02-23 | End: 2020-02-27 | Stop reason: HOSPADM

## 2020-02-23 RX ORDER — MAGNESIUM SULFATE HEPTAHYDRATE 40 MG/ML
2 INJECTION, SOLUTION INTRAVENOUS ONCE
Status: COMPLETED | OUTPATIENT
Start: 2020-02-23 | End: 2020-02-23

## 2020-02-23 RX ADMIN — DOCUSATE SODIUM - SENNOSIDES 1 TABLET: 50; 8.6 TABLET, FILM COATED ORAL at 12:02

## 2020-02-23 RX ADMIN — METRONIDAZOLE 500 MG: 500 INJECTION, SOLUTION INTRAVENOUS at 06:02

## 2020-02-23 RX ADMIN — AMOXICILLIN AND CLAVULANATE POTASSIUM 1 TABLET: 875; 125 TABLET, FILM COATED ORAL at 12:02

## 2020-02-23 RX ADMIN — MAGNESIUM SULFATE IN WATER 2 G: 40 INJECTION, SOLUTION INTRAVENOUS at 08:02

## 2020-02-23 RX ADMIN — POTASSIUM CHLORIDE 40 MEQ: 1500 TABLET, EXTENDED RELEASE ORAL at 08:02

## 2020-02-23 RX ADMIN — ENOXAPARIN SODIUM 40 MG: 100 INJECTION SUBCUTANEOUS at 05:02

## 2020-02-23 RX ADMIN — AMOXICILLIN AND CLAVULANATE POTASSIUM 1 TABLET: 875; 125 TABLET, FILM COATED ORAL at 08:02

## 2020-02-23 RX ADMIN — VITAMIN D, TAB 1000IU (100/BT) 1000 UNITS: 25 TAB at 08:02

## 2020-02-23 RX ADMIN — DOCUSATE SODIUM - SENNOSIDES 1 TABLET: 50; 8.6 TABLET, FILM COATED ORAL at 08:02

## 2020-02-23 RX ADMIN — OXYCODONE HYDROCHLORIDE 5 MG: 5 TABLET ORAL at 08:02

## 2020-02-23 RX ADMIN — PANTOPRAZOLE SODIUM 40 MG: 40 TABLET, DELAYED RELEASE ORAL at 08:02

## 2020-02-23 NOTE — PLAN OF CARE
Evaluation complete and goals set.  Cont with POC  Syl Albright  2/23/2020    Problem: Occupational Therapy Goal  Goal: Occupational Therapy Goal  Description  Goals to be met by: 3/15    Patient will increase functional independence with ADLs by performing:    UE Dressing with [place].  LE Dressing with [place].  Grooming while standing with [place].  Toileting from toilet with [place] for hygiene and clothing management.      Outcome: Ongoing, Progressing

## 2020-02-23 NOTE — PROGRESS NOTES
[facility] Hospital Medicine  Progress Note    Patient Name: Angelica Tomlinson  MRN: [id number]  Patient Class: IP- Inpatient   Admission Date: 2/22/2020  Length of Stay: 1 days  Attending Physician: Monse Cormier MD  Primary Care Provider: Miguel Barroso MD    [facility] [place]: [facility] [place] 3 Dahiana Isaacs MD    Subjective:     Principal Problem:Cholangitis        HPI:  Ms. Tomlinson is a 75yo F with HTN (on Amlodipine previously, but stopped by PCP 2/2 low/normal BP) and advanced stage IIIB gallbladder cancer initially diagnosed in 2016 s/p chemo and radiation and complicated by biliary obstruction who presents on 2/22/20 for abd pain, nausea, and vomiting, following IR transhepatic biliary stent exchange on 2/21/20 (she had PTC drain in place). At time of procedure, trial of internal drainage by turning off external drain. Procedure was uncomplicated and she was d/c same day. Since getting home from the procedure, she reports abdominal pain (worse than normal cancer-related pain) N/V, with multiple episodes of bilious, non-bloody emesis. Denies fevers but reports chills. Denies CP, palpitations, SOB, diarrhea. Last BM was Wednesday. She takes occasional prn ODT Zofran at home with moderate relief in nausea (although she has not taken it since recent procedure). Her usual pain med regimen includes fentanyl patch q72h (current patch was applied 2/21) and po oxycodone. Her normal pain regimen has not been sufficient for her current abd pain.    She was admitted in 12/2019 with similar presentation and was treated for cholangitis, discharged on augmentin. She was diagnosed with gallbladder cancer in 2016 and reports one surgery where they were not able to resect the tumor. [place] [facility] [place] [place] ([facility] per the pt, [facility] per notes from prior admission). She last received chemo approx 1mo ago but it had to be stopped due to her weakness.     Overview/Hospital  Course:  In the ED, workup revealed leukocytosis 19.34, hyponatremia Na 131, elevated T bili 1.3 and LFTs 74/99, elevated  (which is similar to pre-procedure labs), and elevated Lipase 354. UA with 1+ protein, 1+ glucose, non-infectious. Blood and urine cultures sent. US abdomen unremarkable pancreas, no obvious biliary duct dilation. She was given Cipro and flagyl in the ED for suspected cholangitis and admitted to hospital medicine. ABX continued on admission. Pain and nausea treated with pain medication and anti-emetics. Started on rate rate maintenance IVF (?component of pancreatitis, plus decreased po intake). Primary team spoke to IR regarding recent procedure. They stated that they turned off the exterior drain in an attempt to trial internal drainage, however, this likely was not successful due to current presentation. They recommended continuing to treat cholangitis, and opening the stopcock of the exterior biliary drain. If clinical status worsens or does not improve, then they recommend an official IR consult and additional imaging.     Pt clinically improving by 2/23. Labs all trending towards normal or already have normalized. External drain put out 50mL yesterday (<24hrs, as drain was opened midday). Still not tolerating full diet, only small sips of clears.    Interval History: Pt tolerating small sips of clears. Abd pain still present but less severe compared to admission and pain medication is adequate. External biliary drain put our 50mL in <24hrs. Labs normalizing.    Review of Systems   Constitutional: Positive for activity change and appetite change. Negative for chills and fever.   HENT: Negative for congestion and rhinorrhea.    Respiratory: Negative for cough, chest tightness and shortness of breath.    Cardiovascular: Negative for chest pain, palpitations and leg swelling.   Gastrointestinal: Positive for abdominal pain, nausea and vomiting. Negative for abdominal distention, blood in  stool, constipation and diarrhea.   Genitourinary: Negative for difficulty urinating and dysuria.   Musculoskeletal: Negative for back pain, gait problem and myalgias.   Neurological: Positive for weakness. Negative for light-headedness and headaches.   Psychiatric/Behavioral: Negative for confusion.     Objective:     Vital Signs (Most Recent):  Temp: 98.3 °F (36.8 °C) (02/23/20 1146)  Pulse: 84 (02/23/20 1252)  Resp: 18 (02/23/20 1252)  BP: 113/63 (02/23/20 1146)  SpO2: 96 % (02/23/20 1252) Vital Signs (24h Range):  Temp:  [98.2 °F (36.8 °C)-98.9 °F (37.2 °C)] 98.3 °F (36.8 °C)  Pulse:  [] 84  Resp:  [16-18] 18  SpO2:  [93 %-97 %] 96 %  BP: (113-148)/(63-76) 113/63     Weight: 60.3 kg (132 lb 15 oz)  Body mass index is 18.03 kg/m².    Intake/Output Summary (Last 24 hours) at 2/23/2020 1457  Last data filed at 2/23/2020 0645  Gross per 24 hour   Intake 390 ml   Output 50 ml   Net 340 ml      Physical Exam   Constitutional: She is oriented to person, place, and time. She appears well-developed and well-nourished. No distress.   HENT:   Head: Normocephalic and atraumatic.   Dry oral mucosa   Eyes: No scleral icterus.   Neck: Normal range of motion. Neck supple.   Cardiovascular: Normal rate, regular rhythm and normal heart sounds.   Pulmonary/Chest: Effort normal and breath sounds normal. No respiratory distress.   Abdominal: Soft. She exhibits no distension. There is tenderness (RUQ and upper midline/above umbilicus, improving). There is no rebound and no guarding.   Hypoactive bowel sounds upper quadrants, well healed vertical midline incision. External biliary drain in RUQ with small amount of bilious fluid.   Musculoskeletal: She exhibits no edema.   Neurological: She is alert and oriented to person, place, and time.   Skin: Skin is warm and dry. She is not diaphoretic.   Nursing note and vitals reviewed.      Significant Labs:   Recent Results (from the past 24 hour(s))   Comprehensive metabolic panel     Collection Time: 02/23/20  4:20 AM   Result Value Ref Range    Sodium 134 (L) 136 - 145 mmol/L    Potassium 3.3 (L) 3.5 - 5.1 mmol/L    Chloride 99 95 - 110 mmol/L    CO2 26 23 - 29 mmol/L    Glucose 91 70 - 110 mg/dL    BUN, Bld 10 8 - 23 mg/dL    Creatinine 0.6 0.5 - 1.4 mg/dL    Calcium 8.2 (L) 8.7 - 10.5 mg/dL    Total Protein 6.1 6.0 - 8.4 g/dL    Albumin 2.5 (L) 3.5 - 5.2 g/dL    Total Bilirubin 0.8 0.1 - 1.0 mg/dL    Alkaline Phosphatase 247 (H) 55 - 135 U/L    AST 37 10 - 40 U/L    ALT 58 (H) 10 - 44 U/L    Anion Gap 9 8 - 16 mmol/L    eGFR if African American >60.0 >60 mL/min/1.73 m^2    eGFR if non African American >60.0 >60 mL/min/1.73 m^2   Magnesium    Collection Time: 02/23/20  4:20 AM   Result Value Ref Range    Magnesium 1.4 (L) 1.6 - 2.6 mg/dL   Phosphorus    Collection Time: 02/23/20  4:20 AM   Result Value Ref Range    Phosphorus 2.9 2.7 - 4.5 mg/dL   CBC auto differential    Collection Time: 02/23/20  4:20 AM   Result Value Ref Range    WBC 12.03 3.90 - 12.70 K/uL    RBC 4.15 4.00 - 5.40 M/uL    Hemoglobin 12.2 12.0 - 16.0 g/dL    Hematocrit 38.1 37.0 - 48.5 %    Mean Corpuscular Volume 92 82 - 98 fL    Mean Corpuscular Hemoglobin 29.4 27.0 - 31.0 pg    Mean Corpuscular Hemoglobin Conc 32.0 32.0 - 36.0 g/dL    RDW 16.2 (H) 11.5 - 14.5 %    Platelets 249 150 - 350 K/uL    MPV 10.5 9.2 - 12.9 fL    Immature Granulocytes 0.4 0.0 - 0.5 %    Gran # (ANC) 9.2 (H) 1.8 - 7.7 K/uL    Immature Grans (Abs) 0.05 (H) 0.00 - 0.04 K/uL    Lymph # 1.3 1.0 - 4.8 K/uL    Mono # 1.5 (H) 0.3 - 1.0 K/uL    Eos # 0.0 0.0 - 0.5 K/uL    Baso # 0.04 0.00 - 0.20 K/uL    nRBC 0 0 /100 WBC    Gran% 76.3 (H) 38.0 - 73.0 %    Lymph% 10.7 (L) 18.0 - 48.0 %    Mono% 12.2 4.0 - 15.0 %    Eosinophil% 0.1 0.0 - 8.0 %    Basophil% 0.3 0.0 - 1.9 %    Differential Method Automated        Significant Imaging: I have reviewed all pertinent imaging results/findings within the past 24 hours.      Assessment/Plan:      * Cholangitis  75yo  F with advanced stage IIIB gallbladder cancer initially diagnosed in 2016 s/p chemo and radiation and complicated by biliary obstruction who presents on 2/22/20 for abd pain, nausea, and vomiting, following IR transhepatic biliary stent exchange on 2/21/20 (she had PTC drain in place). Afebrile, but with new leukocytosis, elevated LFTs, T bili, and lipase. DDx includes cholangitis vs pancreatitis. Due to recent instrumentation with attempt to internalize biliary drainage, cholangitis is most likely. There may be mild pancreatic inflammation, but primary problem most likely cholangitis.     -  Due to clinical and laboratory improvements on 2/23, will de-escalate ABX from CTX/Flagyl to Augmentin   - Blood cultures NGTD  - Primary team spoke to IR on admission and they recommended opening the stopcock of exterior biliary drain, as trial of internal drainage clearly failed --they rec continue cholangitis treatment and discussing with the pt that in approx 1wk/after completion of ABX, at home pt can attempt a trial of interior drainage by turning off exterior drain   - trend labs, all abnormalities improving   - treat pain - see 'cancer related pain' for pain management plan  - prn anti-emetics, mindful of prolonged QTc  - Advance diet as tolerated  - Continue home PPI      Sepsis        Hyponatremia  Mild hyponatremia on admission, 131. Likely hypovolemic 2/2 poor po intake and multiple episodes of vomiting. Asymptomatic.    - Improved on 2/23 (134) s/p IVF and increasing po intake  - Trend daily       Vitamin D deficiency  - Continue home vit D supplementation      Discharge planning issues  - PT/OT consulted for discharge planning  - Likely ready for d/c on 2/24 to complete course of po ABX, pending tolerating diet      Prolonged Q-T interval on ECG  QTc on . No cardiac or arrhythmia hx. Asymptomatic. On prn ODT Zofran 8mg outpatient without issue.    - low dose prn zofran, will be mindful of QT prolonging  effects of anti-emetics  - Pt has not taken any prn doses of zofran   - Daily EKG -- 2/23 EKG similar to 2/22, QTc remains stable, 533      Cancer related pain  Home pain med regimen includes Fentanyl patch 50mcg q72hrs and prn oxycodone (5-15mg depending on pain). New fentanyl patch placed 2/21 after IR procedure, and pt has the patch on upon admission. Oxycodone at home was not effective for current pain. IV Dilaudid 0.5mg given in the ED with relief.    - Continue home fentanyl patch q72h, next due to be changed Monday 2/24  - Pt only needed one prn dose of IV Dilaudid after admission - will convert to po oxycodone q6h prn in anticipation of probably d/c on 2/24  - Bowel regimen, scheduled - pt has not had a BM in 4 days    HTN (hypertension)  Previously on Amlodipine 10mg, but recently stopped by her PCP due to normal BP on home monitoring. Normotensive on admission.    - Continue to monitor, no need for anti-HTN presently      Gallbladder cancer  Stage IIIB gallbladder cancer on palliative chemo with [id number] [place] [facility] [place], last chemo approx 1mo ago. Pt reports chemo was stopped 2/2 her weakness/debility.    - Lovenox VTE PPX  - f/u with outpatient Oncologist upon discharge           VTE Risk Mitigation (From admission, onward)         Ordered     enoxaparin injection 40 mg  Daily      02/22/20 0700     IP VTE HIGH RISK PATIENT  Once      02/22/20 0700                      Dahiana Isaacs MD  Department of Hospital Medicine   [facility]

## 2020-02-23 NOTE — ASSESSMENT & PLAN NOTE
Home pain med regimen includes Fentanyl patch 50mcg q72hrs and prn oxycodone (5-15mg depending on pain). New fentanyl patch placed 2/21 after IR procedure, and pt has the patch on upon admission. Oxycodone at home was not effective for current pain. IV Dilaudid 0.5mg given in the ED with relief.    - Continue home fentanyl patch q72h, next due to be changed Monday 2/24  - Pt only needed one prn dose of IV Dilaudid after admission - will convert to po oxycodone q6h prn in anticipation of probably d/c on 2/24  - Bowel regimen, scheduled - pt has not had a BM in 4 days

## 2020-02-23 NOTE — ASSESSMENT & PLAN NOTE
Mild hyponatremia on admission, 131. Likely hypovolemic 2/2 poor po intake and multiple episodes of vomiting. Asymptomatic.    - Improved on 2/23 (134) s/p IVF and increasing po intake  - Trend daily

## 2020-02-23 NOTE — ASSESSMENT & PLAN NOTE
75yo F with advanced stage IIIB gallbladder cancer initially diagnosed in 2016 s/p chemo and radiation and complicated by biliary obstruction who presents on 2/22/20 for abd pain, nausea, and vomiting, following IR transhepatic biliary stent exchange on 2/21/20 (she had PTC drain in place). Afebrile, but with new leukocytosis, elevated LFTs, T bili, and lipase. DDx includes cholangitis vs pancreatitis. Due to recent instrumentation with attempt to internalize biliary drainage, cholangitis is most likely. There may be mild pancreatic inflammation, but primary problem most likely cholangitis.     -  Due to clinical and laboratory improvements on 2/23, will de-escalate ABX from CTX/Flagyl to Augmentin   - Blood cultures NGTD  - Primary team spoke to IR on admission and they recommended opening the stopcock of exterior biliary drain, as trial of internal drainage clearly failed --they rec continue cholangitis treatment and discussing with the pt that in approx 1wk/after completion of ABX, at home pt can attempt a trial of interior drainage by turning off exterior drain   - trend labs, all abnormalities improving   - treat pain - see 'cancer related pain' for pain management plan  - prn anti-emetics, mindful of prolonged QTc  - Advance diet as tolerated  - Continue home PPI

## 2020-02-23 NOTE — ASSESSMENT & PLAN NOTE
QTc on . No cardiac or arrhythmia hx. Asymptomatic. On prn ODT Zofran 8mg outpatient without issue.    - low dose prn zofran, will be mindful of QT prolonging effects of anti-emetics  - Pt has not taken any prn doses of zofran   - Daily EKG -- 2/23 EKG similar to 2/22, QTc remains stable, 533

## 2020-02-23 NOTE — PLAN OF CARE
Pt aaox4. V/s stable.  at bedside. No complaints of pain or discomfort at this time. Drain site intact. will continue to monitor and interventions as appropriate.

## 2020-02-23 NOTE — ASSESSMENT & PLAN NOTE
- PT/OT consulted for discharge planning  - Likely ready for d/c on 2/24 to complete course of po ABX, pending tolerating diet

## 2020-02-23 NOTE — ASSESSMENT & PLAN NOTE
Stage IIIB gallbladder cancer on palliative chemo with mFOLFOX6 at [facility] [place], last chemo approx 1mo ago. Pt reports chemo was stopped 2/2 her weakness/debility.    - Lovenox VTE PPX  - f/u with outpatient Oncologist upon discharge

## 2020-02-23 NOTE — SUBJECTIVE & OBJECTIVE
Interval History: Pt tolerating small sips of clears. Abd pain still present but less severe compared to admission and pain medication is adequate. External biliary drain put our 50mL in <24hrs. Labs normalizing.    Review of Systems   Constitutional: Positive for activity change and appetite change. Negative for chills and fever.   HENT: Negative for congestion and rhinorrhea.    Respiratory: Negative for cough, chest tightness and shortness of breath.    Cardiovascular: Negative for chest pain, palpitations and leg swelling.   Gastrointestinal: Positive for abdominal pain, nausea and vomiting. Negative for abdominal distention, blood in stool, constipation and diarrhea.   Genitourinary: Negative for difficulty urinating and dysuria.   Musculoskeletal: Negative for back pain, gait problem and myalgias.   Neurological: Positive for weakness. Negative for light-headedness and headaches.   Psychiatric/Behavioral: Negative for confusion.     Objective:     Vital Signs (Most Recent):  Temp: 98.3 °F (36.8 °C) (02/23/20 1146)  Pulse: 84 (02/23/20 1252)  Resp: 18 (02/23/20 1252)  BP: 113/63 (02/23/20 1146)  SpO2: 96 % (02/23/20 1252) Vital Signs (24h Range):  Temp:  [98.2 °F (36.8 °C)-98.9 °F (37.2 °C)] 98.3 °F (36.8 °C)  Pulse:  [] 84  Resp:  [16-18] 18  SpO2:  [93 %-97 %] 96 %  BP: (113-148)/(63-76) 113/63     Weight: 60.3 kg (132 lb 15 oz)  Body mass index is 18.03 kg/m².    Intake/Output Summary (Last 24 hours) at 2/23/2020 8418  Last data filed at 2/23/2020 0645  Gross per 24 hour   Intake 390 ml   Output 50 ml   Net 340 ml      Physical Exam   Constitutional: She is oriented to person, place, and time. She appears well-developed and well-nourished. No distress.   HENT:   Head: Normocephalic and atraumatic.   Dry oral mucosa   Eyes: No scleral icterus.   Neck: Normal range of motion. Neck supple.   Cardiovascular: Normal rate, regular rhythm and normal heart sounds.   Pulmonary/Chest: Effort normal and breath  sounds normal. No respiratory distress.   Abdominal: Soft. She exhibits no distension. There is tenderness (RUQ and upper midline/above umbilicus, improving). There is no rebound and no guarding.   Hypoactive bowel sounds upper quadrants, well healed vertical midline incision. External biliary drain in RUQ with small amount of bilious fluid.   Musculoskeletal: She exhibits no edema.   Neurological: She is alert and oriented to person, place, and time.   Skin: Skin is warm and dry. She is not diaphoretic.   Nursing note and vitals reviewed.      Significant Labs:   Recent Results (from the past 24 hour(s))   Comprehensive metabolic panel    Collection Time: 02/23/20  4:20 AM   Result Value Ref Range    Sodium 134 (L) 136 - 145 mmol/L    Potassium 3.3 (L) 3.5 - 5.1 mmol/L    Chloride 99 95 - 110 mmol/L    CO2 26 23 - 29 mmol/L    Glucose 91 70 - 110 mg/dL    BUN, Bld 10 8 - 23 mg/dL    Creatinine 0.6 0.5 - 1.4 mg/dL    Calcium 8.2 (L) 8.7 - 10.5 mg/dL    Total Protein 6.1 6.0 - 8.4 g/dL    Albumin 2.5 (L) 3.5 - 5.2 g/dL    Total Bilirubin 0.8 0.1 - 1.0 mg/dL    Alkaline Phosphatase 247 (H) 55 - 135 U/L    AST 37 10 - 40 U/L    ALT 58 (H) 10 - 44 U/L    Anion Gap 9 8 - 16 mmol/L    eGFR if African American >60.0 >60 mL/min/1.73 m^2    eGFR if non African American >60.0 >60 mL/min/1.73 m^2   Magnesium    Collection Time: 02/23/20  4:20 AM   Result Value Ref Range    Magnesium 1.4 (L) 1.6 - 2.6 mg/dL   Phosphorus    Collection Time: 02/23/20  4:20 AM   Result Value Ref Range    Phosphorus 2.9 2.7 - 4.5 mg/dL   CBC auto differential    Collection Time: 02/23/20  4:20 AM   Result Value Ref Range    WBC 12.03 3.90 - 12.70 K/uL    RBC 4.15 4.00 - 5.40 M/uL    Hemoglobin 12.2 12.0 - 16.0 g/dL    Hematocrit 38.1 37.0 - 48.5 %    Mean Corpuscular Volume 92 82 - 98 fL    Mean Corpuscular Hemoglobin 29.4 27.0 - 31.0 pg    Mean Corpuscular Hemoglobin Conc 32.0 32.0 - 36.0 g/dL    RDW 16.2 (H) 11.5 - 14.5 %    Platelets 249 150 -  350 K/uL    MPV 10.5 9.2 - 12.9 fL    Immature Granulocytes 0.4 0.0 - 0.5 %    Gran # (ANC) 9.2 (H) 1.8 - 7.7 K/uL    Immature Grans (Abs) 0.05 (H) 0.00 - 0.04 K/uL    Lymph # 1.3 1.0 - 4.8 K/uL    Mono # 1.5 (H) 0.3 - 1.0 K/uL    Eos # 0.0 0.0 - 0.5 K/uL    Baso # 0.04 0.00 - 0.20 K/uL    nRBC 0 0 /100 WBC    Gran% 76.3 (H) 38.0 - 73.0 %    Lymph% 10.7 (L) 18.0 - 48.0 %    Mono% 12.2 4.0 - 15.0 %    Eosinophil% 0.1 0.0 - 8.0 %    Basophil% 0.3 0.0 - 1.9 %    Differential Method Automated        Significant Imaging: I have reviewed all pertinent imaging results/findings within the past 24 hours.

## 2020-02-24 LAB
ALBUMIN SERPL BCP-MCNC: 2.5 G/DL (ref 3.5–5.2)
ALP SERPL-CCNC: 247 U/L (ref 55–135)
ALT SERPL W/O P-5'-P-CCNC: 40 U/L (ref 10–44)
ANION GAP SERPL CALC-SCNC: 9 MMOL/L (ref 8–16)
AST SERPL-CCNC: 24 U/L (ref 10–40)
BASOPHILS # BLD AUTO: 0.05 K/UL (ref 0–0.2)
BASOPHILS NFR BLD: 0.6 % (ref 0–1.9)
BILIRUB SERPL-MCNC: 0.8 MG/DL (ref 0.1–1)
BUN SERPL-MCNC: 12 MG/DL (ref 8–23)
CALCIUM SERPL-MCNC: 8.1 MG/DL (ref 8.7–10.5)
CHLORIDE SERPL-SCNC: 102 MMOL/L (ref 95–110)
CO2 SERPL-SCNC: 25 MMOL/L (ref 23–29)
CREAT SERPL-MCNC: 0.6 MG/DL (ref 0.5–1.4)
DIFFERENTIAL METHOD: ABNORMAL
EOSINOPHIL # BLD AUTO: 0.1 K/UL (ref 0–0.5)
EOSINOPHIL NFR BLD: 0.7 % (ref 0–8)
ERYTHROCYTE [DISTWIDTH] IN BLOOD BY AUTOMATED COUNT: 16.2 % (ref 11.5–14.5)
EST. GFR  (AFRICAN AMERICAN): >60 ML/MIN/1.73 M^2
EST. GFR  (NON AFRICAN AMERICAN): >60 ML/MIN/1.73 M^2
GLUCOSE SERPL-MCNC: 70 MG/DL (ref 70–110)
HCT VFR BLD AUTO: 34.6 % (ref 37–48.5)
HGB BLD-MCNC: 11 G/DL (ref 12–16)
IMM GRANULOCYTES # BLD AUTO: 0.03 K/UL (ref 0–0.04)
IMM GRANULOCYTES NFR BLD AUTO: 0.3 % (ref 0–0.5)
LYMPHOCYTES # BLD AUTO: 1.5 K/UL (ref 1–4.8)
LYMPHOCYTES NFR BLD: 16.4 % (ref 18–48)
MAGNESIUM SERPL-MCNC: 1.9 MG/DL (ref 1.6–2.6)
MCH RBC QN AUTO: 29.5 PG (ref 27–31)
MCHC RBC AUTO-ENTMCNC: 31.8 G/DL (ref 32–36)
MCV RBC AUTO: 93 FL (ref 82–98)
MONOCYTES # BLD AUTO: 1 K/UL (ref 0.3–1)
MONOCYTES NFR BLD: 11.4 % (ref 4–15)
NEUTROPHILS # BLD AUTO: 6.4 K/UL (ref 1.8–7.7)
NEUTROPHILS NFR BLD: 70.6 % (ref 38–73)
NRBC BLD-RTO: 0 /100 WBC
PHOSPHATE SERPL-MCNC: 3.4 MG/DL (ref 2.7–4.5)
PLATELET # BLD AUTO: 207 K/UL (ref 150–350)
PMV BLD AUTO: 10.4 FL (ref 9.2–12.9)
POTASSIUM SERPL-SCNC: 4 MMOL/L (ref 3.5–5.1)
PROT SERPL-MCNC: 5.7 G/DL (ref 6–8.4)
RBC # BLD AUTO: 3.73 M/UL (ref 4–5.4)
SODIUM SERPL-SCNC: 136 MMOL/L (ref 136–145)
WBC # BLD AUTO: 9.02 K/UL (ref 3.9–12.7)

## 2020-02-24 PROCEDURE — 83735 ASSAY OF MAGNESIUM: CPT | Mod: HCNC

## 2020-02-24 PROCEDURE — 85025 COMPLETE CBC W/AUTO DIFF WBC: CPT | Mod: HCNC

## 2020-02-24 PROCEDURE — 93005 ELECTROCARDIOGRAM TRACING: CPT | Mod: HCNC

## 2020-02-24 PROCEDURE — 11000001 HC ACUTE MED/SURG PRIVATE ROOM: Mod: HCNC

## 2020-02-24 PROCEDURE — 97116 GAIT TRAINING THERAPY: CPT | Mod: HCNC

## 2020-02-24 PROCEDURE — 99232 SBSQ HOSP IP/OBS MODERATE 35: CPT | Mod: HCNC,GC,, | Performed by: HOSPITALIST

## 2020-02-24 PROCEDURE — 63600175 PHARM REV CODE 636 W HCPCS: Mod: HCNC | Performed by: EMERGENCY MEDICINE

## 2020-02-24 PROCEDURE — 36415 COLL VENOUS BLD VENIPUNCTURE: CPT | Mod: HCNC

## 2020-02-24 PROCEDURE — 25000003 PHARM REV CODE 250: Mod: HCNC | Performed by: STUDENT IN AN ORGANIZED HEALTH CARE EDUCATION/TRAINING PROGRAM

## 2020-02-24 PROCEDURE — 80053 COMPREHEN METABOLIC PANEL: CPT | Mod: HCNC

## 2020-02-24 PROCEDURE — 93010 EKG 12-LEAD: ICD-10-PCS | Mod: HCNC,,, | Performed by: INTERNAL MEDICINE

## 2020-02-24 PROCEDURE — 97161 PT EVAL LOW COMPLEX 20 MIN: CPT | Mod: HCNC

## 2020-02-24 PROCEDURE — 93010 ELECTROCARDIOGRAM REPORT: CPT | Mod: HCNC,,, | Performed by: INTERNAL MEDICINE

## 2020-02-24 PROCEDURE — 99232 PR SUBSEQUENT HOSPITAL CARE,LEVL II: ICD-10-PCS | Mod: HCNC,GC,, | Performed by: HOSPITALIST

## 2020-02-24 PROCEDURE — 94761 N-INVAS EAR/PLS OXIMETRY MLT: CPT | Mod: HCNC

## 2020-02-24 PROCEDURE — 84100 ASSAY OF PHOSPHORUS: CPT | Mod: HCNC

## 2020-02-24 RX ORDER — DOCUSATE SODIUM 100 MG/1
100-300 CAPSULE, LIQUID FILLED ORAL
Status: ON HOLD | COMMUNITY
End: 2020-02-27 | Stop reason: HOSPADM

## 2020-02-24 RX ORDER — AMOXICILLIN AND CLAVULANATE POTASSIUM 875; 125 MG/1; MG/1
1 TABLET, FILM COATED ORAL 2 TIMES DAILY
Qty: 8 TABLET | Refills: 0 | Status: CANCELLED | OUTPATIENT
Start: 2020-02-24 | End: 2020-02-28

## 2020-02-24 RX ORDER — POLYETHYLENE GLYCOL 3350 17 G/17G
17 POWDER, FOR SOLUTION ORAL DAILY
Qty: 30 PACKET | Refills: 1 | Status: CANCELLED | OUTPATIENT
Start: 2020-02-24

## 2020-02-24 RX ADMIN — DOCUSATE SODIUM - SENNOSIDES 1 TABLET: 50; 8.6 TABLET, FILM COATED ORAL at 08:02

## 2020-02-24 RX ADMIN — PANTOPRAZOLE SODIUM 40 MG: 40 TABLET, DELAYED RELEASE ORAL at 08:02

## 2020-02-24 RX ADMIN — ENOXAPARIN SODIUM 40 MG: 100 INJECTION SUBCUTANEOUS at 04:02

## 2020-02-24 RX ADMIN — OXYCODONE HYDROCHLORIDE 5 MG: 5 TABLET ORAL at 08:02

## 2020-02-24 RX ADMIN — VITAMIN D, TAB 1000IU (100/BT) 1000 UNITS: 25 TAB at 08:02

## 2020-02-24 RX ADMIN — AMOXICILLIN AND CLAVULANATE POTASSIUM 1 TABLET: 875; 125 TABLET, FILM COATED ORAL at 08:02

## 2020-02-24 RX ADMIN — FENTANYL 1 PATCH: 50 PATCH, EXTENDED RELEASE TRANSDERMAL at 08:02

## 2020-02-24 RX ADMIN — POLYETHYLENE GLYCOL 3350 17 G: 17 POWDER, FOR SOLUTION ORAL at 08:02

## 2020-02-24 NOTE — ASSESSMENT & PLAN NOTE
Stage IIIB [place]bladder [place] [facility] [place] approx 1mo ago. Pt reports chemo was stopped 2/2 her weakness/debility.    - Lovenox VTE PPX  - f/u with outpatient Oncologist upon discharge

## 2020-02-24 NOTE — CARE UPDATE
IR Note    Patient's admission is likely due to cholangitis from stent manipulation and not failure of the stent. Too early to know if stent has failed.     Plan for cholangiogram on 2/26 to evaluate biliary system and stent.  NPO at midnight prior  Hold lovenox night prior.    Bradley Rebolledo M.D.  PGY-II Radiology  Pager: [phone]

## 2020-02-24 NOTE — ASSESSMENT & PLAN NOTE
Home pain med regimen includes Fentanyl patch 50mcg q72hrs and prn oxycodone (5-15mg depending on pain). New fentanyl patch placed 2/21 after IR procedure, and pt has the patch on upon admission. Oxycodone at home was not effective for current pain. IV Dilaudid 0.5mg given in the ED with relief.    - Continue home fentanyl patch q72h, next due to be changed Monday 2/24  - Bowel regimen, scheduled

## 2020-02-24 NOTE — ASSESSMENT & PLAN NOTE
QTc on . No cardiac or arrhythmia hx. Asymptomatic. On prn ODT Zofran 8mg outpatient without issue.    - low dose prn zofran  - Daily EKG -- 2/23 EKG similar to 2/22, QTc remains stable, 533

## 2020-02-24 NOTE — ASSESSMENT & PLAN NOTE
75yo F with advanced stage IIIB gallbladder cancer initially diagnosed in 2016 s/p chemo and radiation and complicated by biliary obstruction who presents on 2/22/20 for abd pain, nausea, and vomiting, following IR transhepatic biliary stent exchange on 2/21/20 (she had PTC drain in place). Afebrile, but with new leukocytosis, elevated LFTs, T bili, and lipase. DDx includes cholangitis vs pancreatitis. Due to recent instrumentation with attempt to internalize biliary drainage, cholangitis is most likely. There may be mild pancreatic inflammation, but primary problem most likely cholangitis.     -  Due to clinical and laboratory improvements on 2/23, will de-escalate ABX from CTX/Flagyl to Augmentin   - Blood cultures NGTD  - Consulted IR- recommended leaving external drain open & f/u w/ them in 1 week  - Advance diet as tolerated  - Continue home PPI

## 2020-02-24 NOTE — PLAN OF CARE
CM met with patient and spouse( Teddy Tomlinson [phone])  in room for [place] Planning Assessment.  Patient was able to answer questions.  Per patient she  lives with spouse in a house  with one step(s) to enter.   Per patient she was independent  with ADLS and ambulation.  Patient will have assistance from spouse upon discharge.  All questions addressed.  CM will follow for needs. Patient stated she has a rolling walker and rollator at home already. Family to provide transportation home upon discharge.         02/24/20 1003   Discharge Assessment   Assessment Type Discharge Planning Assessment   Confirmed/corrected address and phone number on facesheet? Yes   Assessment information obtained from? Patient   Expected Length of Stay (days) 2   Communicated expected length of stay with patient/caregiver yes   Prior to hospitilization cognitive status: Alert/Oriented   Prior to hospitalization functional status: Independent   Current cognitive status: Alert/Oriented   Current Functional Status: Independent   Lives With spouse   Able to Return to Prior Arrangements yes   Is patient able to care for self after discharge? Yes   Patient's perception of discharge disposition home or selfcare   Readmission Within the Last 30 Days current reason for admission unrelated to previous admission   Patient currently being followed by outpatient case management? No   Patient currently receives any other outside agency services? No   Equipment Currently Used at Home other (see comments)  (Patient states she has a rolling walker and rollator at home already)   Do you have any problems affording any of your prescribed medications? No   Is the patient taking medications as prescribed? yes   Does the patient have transportation home? Yes   Transportation Anticipated family or friend will provide   Does the patient receive services at the [facility]? No   Discharge Plan A Home   Discharge Plan B Home   DME Needed Upon Discharge   other (see comments)  (Patient states she has a rolling walker and rollator at home already.)   Patient/Family in Agreement with Plan yes   Readmission Questionnaire   At the time of your discharge, did someone talk to you about what your health problems were? Yes   At the time of discharge, did someone talk to you about what to watch out for regarding worsening of your health problem? Yes   At the time of discharge, did someone talk to you about what to do if you experienced worsening of your health problem? Yes   At the time of discharge, did someone talk to you about which medication to take when you left the hospital and which ones to stop taking? Yes   At the time of discharge, did someone talk to you about when and where to follow up with a doctor after you left the hospital? Yes   How often do you need to have someone help you when you read instructions, pamphlets, or other written material from your doctor or pharmacy? Never   Do you have problems taking your medications as prescribed? No   Do you have any problems affording any of  your prescribed medications? No   Do you have problems obtaining/receiving your medications? No   Does the patient have transportation to healthcare appointments? Yes   Living Arrangements house   Does the patient have family/friends to help with healtcare needs after discharge? yes   Does your caregiver provide all the help you need? Yes   Are you currently feeling confused? No   Are you currently having problems thinking? No   Are you currently having memory problems? No   In the last 7 days, my sleep quality was: fair   Miguel Barroso MD       [facility] ([place], [place] - [address]  [address]  [place] [address]  Phone: [phone] Fax: [phone]    [facility] Mail Delivery - [facility] [address] [facility]  [address] [facility] [address]  Phone: [phone] Fax: [phone]    Payor: [facility] MANAGED MEDICARE /  Plan: [employer] MEDICARE HMO / Product Type: Capitation /

## 2020-02-24 NOTE — SUBJECTIVE & OBJECTIVE
Interval History: Pt now tolerating diet    Review of Systems   Constitutional: Positive for activity change and appetite change. Negative for chills and fever.   HENT: Negative for congestion and rhinorrhea.    Respiratory: Negative for cough, chest tightness and shortness of breath.    Cardiovascular: Negative for chest pain, palpitations and leg swelling.   Gastrointestinal: Negative for abdominal distention, abdominal pain, blood in stool, constipation, diarrhea, nausea and vomiting.   Genitourinary: Negative for difficulty urinating and dysuria.   Musculoskeletal: Negative for back pain, gait problem and myalgias.   Neurological: Positive for weakness. Negative for light-headedness and headaches.   Psychiatric/Behavioral: Negative for confusion.     Objective:     Vital Signs (Most Recent):  Temp: 98.1 °F (36.7 °C) (02/24/20 0734)  Pulse: 70 (02/24/20 0734)  Resp: 18 (02/24/20 0734)  BP: (!) 118/56 (02/24/20 0734)  SpO2: 97 % (02/24/20 0734) Vital Signs (24h Range):  Temp:  [97.8 °F (36.6 °C)-98.7 °F (37.1 °C)] 98.1 °F (36.7 °C)  Pulse:  [70-84] 70  Resp:  [18] 18  SpO2:  [97 %-98 %] 97 %  BP: (117-137)/(56-66) 118/56     Weight: 80.1 kg (176 lb 9.4 oz)  Body mass index is 23.95 kg/m².    Intake/Output Summary (Last 24 hours) at 2/24/2020 1447  Last data filed at 2/24/2020 0906  Gross per 24 hour   Intake 240 ml   Output 250 ml   Net -10 ml      Physical Exam   Constitutional: She is oriented to person, place, and time. She appears well-developed and well-nourished. No distress.   HENT:   Head: Normocephalic and atraumatic.   Dry oral mucosa   Eyes: No scleral icterus.   Neck: Normal range of motion. Neck supple.   Cardiovascular: Normal rate, regular rhythm and normal heart sounds.   Pulmonary/Chest: Effort normal and breath sounds normal. No respiratory distress.   Abdominal: Soft. She exhibits no distension. There is tenderness (R side abdomen). There is no rebound and no guarding.    External biliary drain  in RUQ with small amount of bilious fluid.   Musculoskeletal: She exhibits no edema.   Neurological: She is alert and oriented to person, place, and time.   Skin: Skin is warm and dry. She is not diaphoretic.   Nursing note and vitals reviewed.

## 2020-02-24 NOTE — PLAN OF CARE
Pt aaox4. V/s stable.  at bedside. No complaints of pain or discomfort at this time. Drain site intact. Output documented. will continue to monitor and interventions as appropriate.

## 2020-02-24 NOTE — PLAN OF CARE
02/24/20 0955   Post-Acute Status   Post-Acute Authorization Home Health/Hospice   Home Health/Hospice Status Awaiting Internal Medical Clearance

## 2020-02-24 NOTE — PLAN OF CARE
Eval completed and POC established     Eric Moreno, PT, DPT  2020         Problem: Physical Therapy Goal  Goal: Physical Therapy Goal  Description  Goals to be met by: 3/24/2020    Patient will increase functional independence with mobility by performin. Supine to sit with Modified [place]  2. Sit to supine with Modified [place]  3. Sit to stand transfer with Modified [place]  4. Gait  x 100 feet with Supervision using LRAD  5. Lower extremity exercise program x15 reps per handout, with independence    Outcome: Ongoing, Progressing

## 2020-02-24 NOTE — PROGRESS NOTES
[facility] Hospital Medicine  Progress Note    Patient Name: Angelica Tomlinson  MRN: [id number]  Patient Class: IP- Inpatient   Admission Date: 2/22/2020  Length of Stay: 2 days  Attending Physician: Monse Cormier MD  Primary Care Provider: Miguel Barroso MD    [facility] Medicine Team: [facility] 3 Sue Saldana MD    Subjective:     Principal Problem:Cholangitis        HPI:  Ms. Tomlinosn is a 73yo F with HTN (on Amlodipine previously, but stopped by PCP 2/2 low/normal BP) and advanced stage IIIB gallbladder cancer initially diagnosed in 2016 s/p chemo and radiation and complicated by biliary obstruction who presents on 2/22/20 for abd pain, nausea, and vomiting, following IR transhepatic biliary stent exchange on 2/21/20 (she had PTC drain in place). At time of procedure, trial of internal drainage by turning off external drain. Procedure was uncomplicated and she was d/c same day. Since getting home from the procedure, she reports abdominal pain (worse than normal cancer-related pain) N/V, with multiple episodes of bilious, non-bloody emesis. Denies fevers but reports chills. Denies CP, palpitations, SOB, diarrhea. Last BM was Wednesday. She takes occasional prn ODT Zofran at home with moderate relief in nausea (although she has not taken it since recent procedure). Her usual pain med regimen includes fentanyl patch q72h (current patch was applied 2/21) and po oxycodone. Her normal pain regimen has not been sufficient for her current abd pain.    She was admitted in 12/2019 with similar presentation and was treated for cholangitis, discharged on augmentin. She was diagnosed with gallbladder cancer in 2016 and reports one surgery where they were not able to resect the tumor. [facility]cology f/u with [facility] per care everywhere documents ([facility] per the pt, [facility] per notes from prior admission). She last received chemo approx 1mo ago but it had to be stopped due to her weakness.     Overview/Hospital  Course:  Pt admitted for leukocytosis, hyponatremia, & elevated LFTs & T bili. US abdomen showed no biliary duct dilation. Pt started on cipro & flagyl for possible cholangitis. IR had turned off exterior biliary drain on 2/21 in an attempt to trial internal drainage but was unsucessful- consulted IR again & they reopened external drain. Pt has since improved & is now tolerating diet.         Interval History: Pt now tolerating diet    Review of Systems   Constitutional: Positive for activity change and appetite change. Negative for chills and fever.   HENT: Negative for congestion and rhinorrhea.    Respiratory: Negative for cough, chest tightness and shortness of breath.    Cardiovascular: Negative for chest pain, palpitations and leg swelling.   Gastrointestinal: Negative for abdominal distention, abdominal pain, blood in stool, constipation, diarrhea, nausea and vomiting.   Genitourinary: Negative for difficulty urinating and dysuria.   Musculoskeletal: Negative for back pain, gait problem and myalgias.   Neurological: Positive for weakness. Negative for light-headedness and headaches.   Psychiatric/Behavioral: Negative for confusion.     Objective:     Vital Signs (Most Recent):  Temp: 98.1 °F (36.7 °C) (02/24/20 0734)  Pulse: 70 (02/24/20 0734)  Resp: 18 (02/24/20 0734)  BP: (!) 118/56 (02/24/20 0734)  SpO2: 97 % (02/24/20 0734) Vital Signs (24h Range):  Temp:  [97.8 °F (36.6 °C)-98.7 °F (37.1 °C)] 98.1 °F (36.7 °C)  Pulse:  [70-84] 70  Resp:  [18] 18  SpO2:  [97 %-98 %] 97 %  BP: (117-137)/(56-66) 118/56     Weight: 80.1 kg (176 lb 9.4 oz)  Body mass index is 23.95 kg/m².    Intake/Output Summary (Last 24 hours) at 2/24/2020 1447  Last data filed at 2/24/2020 0906  Gross per 24 hour   Intake 240 ml   Output 250 ml   Net -10 ml      Physical Exam   Constitutional: She is oriented to person, place, and time. She appears well-developed and well-nourished. No distress.   HENT:   Head: Normocephalic and  atraumatic.   Dry oral mucosa   Eyes: No scleral icterus.   Neck: Normal range of motion. Neck supple.   Cardiovascular: Normal rate, regular rhythm and normal heart sounds.   Pulmonary/Chest: Effort normal and breath sounds normal. No respiratory distress.   Abdominal: Soft. She exhibits no distension. There is tenderness (R side abdomen). There is no rebound and no guarding.    External biliary drain in RUQ with small amount of bilious fluid.   Musculoskeletal: She exhibits no edema.   Neurological: She is alert and oriented to person, place, and time.   Skin: Skin is warm and dry. She is not diaphoretic.   Nursing note and vitals reviewed.          Assessment/Plan:      * Cholangitis  73yo F with advanced stage IIIB gallbladder cancer initially diagnosed in 2016 s/p chemo and radiation and complicated by biliary obstruction who presents on 2/22/20 for abd pain, nausea, and vomiting, following IR transhepatic biliary stent exchange on 2/21/20 (she had PTC drain in place). Afebrile, but with new leukocytosis, elevated LFTs, T bili, and lipase. DDx includes cholangitis vs pancreatitis. Due to recent instrumentation with attempt to internalize biliary drainage, cholangitis is most likely. There may be mild pancreatic inflammation, but primary problem most likely cholangitis.     -  Due to clinical and laboratory improvements on 2/23, will de-escalate ABX from CTX/Flagyl to Augmentin   - Blood cultures NGTD  - Consulted IR- recommended leaving external drain open & f/u w/ them in 1 week  - Advance diet as tolerated  - Continue home PPI      Sepsis        Hyponatremia  Mild hyponatremia on admission, 131. Likely hypovolemic 2/2 poor po intake and multiple episodes of vomiting. Asymptomatic.    - Improved on 2/23 (134) s/p IVF and increasing po intake  - Trend daily       Vitamin D deficiency  - Continue home vit D supplementation      Discharge planning issues  - PT/OT consulted for discharge planning  - D/C 2/25 on 3  more days of augmentin      Prolonged Q-T interval on ECG  QTc on . No cardiac or arrhythmia hx. Asymptomatic. On prn ODT Zofran 8mg outpatient without issue.    - low dose prn zofran  - Daily EKG -- 2/23 EKG similar to 2/22, QTc remains stable, 533      Cancer related pain  Home pain med regimen includes Fentanyl patch 50mcg q72hrs and prn oxycodone (5-15mg depending on pain). New fentanyl patch placed 2/21 after IR procedure, and pt has the patch on upon admission. Oxycodone at home was not effective for current pain. IV Dilaudid 0.5mg given in the ED with relief.    - Continue home fentanyl patch q72h, next due to be changed Monday 2/24  - Bowel regimen, scheduled    HTN (hypertension)  Previously on Amlodipine 10mg, but recently stopped by her PCP due to normal BP on home monitoring. Normotensive on admission.    - Continue to monitor, no need for anti-HTN presently      Gallbladder cancer  Stage IIIB gallbladder cancer on palliative chemo with [id number] at [facility], last chemo approx 1mo ago. Pt reports chemo was stopped 2/2 her weakness/debility.    - Lovenox VTE PPX  - f/u with outpatient Oncologist upon discharge           VTE Risk Mitigation (From admission, onward)         Ordered     enoxaparin injection 40 mg  Daily      02/22/20 0700     IP VTE HIGH RISK PATIENT  Once      02/22/20 0700                      Sue Saldana MD  Department of Hospital Medicine   [facility]

## 2020-02-24 NOTE — PT/OT/SLP EVAL
Physical Therapy Evaluation    Patient Name:  Angelica Tomlinson   MRN:  [id number]    Recommendations:     Discharge Recommendations:  home with home health   Discharge Equipment Recommendations: none   Barriers to discharge: None    Assessment:     Angelica Tomlinson is a 74 y.o. female admitted with a medical diagnosis of Cholangitis.  She presents with the following impairments/functional limitations:  weakness, impaired functional mobilty, gait instability, impaired endurance, impaired balance, impaired self care skills. Pt performing functional mobility near baseline as pt required SBA throughout this session. Pt would benefit from continued skilled acute PT 2x/wk to improve functional mobility.  Recommending pt receive PT services in  setting following discharge from hospital once medically cleared. pt safe to mobilize with nursing utilizing RW delivered to room.     Rehab Prognosis: Fair; patient would benefit from acute skilled PT services to address these deficits and reach maximum level of function.    Recent Surgery: * No surgery found *      Plan:     During this hospitalization, patient to be seen 2 x/week to address the identified rehab impairments via gait training, therapeutic activities, therapeutic exercises, neuromuscular re-education and progress toward the following goals:    · Plan of Care Expires:  03/24/20    Subjective     Chief Complaint: none noted   Patient/Family Comments/goals: Pt pleasant and willing to participate with therapy on this date.    Pain/Comfort:  Pain Rating 1: 0/10    Patients cultural, spiritual, Nondenominational conflicts given the current situation: no    Living Environment:  Pt lives w/spouse in [place] w/0 JAKY.   Prior to admission, patients level of function was reported (I) w/ADLs and amb PRN w/RW.  Equipment used at home: walker, rolling.  DME owned (not currently used): none.  Upon discharge, patient will have assistance from spouse.    Objective:     Communicated with  NSG prior to session.  Patient found supine with peripheral IV, telemetry  upon PT entry to room.    General Precautions: Standard, fall   Orthopedic Precautions:N/A   Braces: N/A     Exams:  · Gross Motor Coordination:  WFL  · RLE ROM: WFL  · RLE Strength: WFL  · LLE ROM: WFL  · LLE Strength: WFL    Functional Mobility:  · Bed Mobility:     · Supine to Sit: stand by assistance  · Sit to Supine: stand by assistance  · Transfers:     · Sit to Stand:  stand by assistance with no AD  · Gait: ~100ft SBA w/RW demonstrating decreased pal   · Balance: Sitting: SBA      Standing: SBA      Therapeutic Activities and Exercises:   - Pt educated on:   -PT roles, expectations, and POC    -Safety with mobility   -Benefits of OOB activities to increase strength and functional mobility    -Performing ther ex for increasing LE ROM and strength   -Discharge recommendations     AM-PAC 6 CLICK MOBILITY  Total Score:18     Patient left HOB elevated with call button in reach.    GOALS:   Multidisciplinary Problems     Physical Therapy Goals        Problem: Physical Therapy Goal    Goal Priority Disciplines Outcome Goal Variances Interventions   Physical Therapy Goal     PT, PT/OT Ongoing, Progressing     Description:  Goals to be met by: 3/24/2020    Patient will increase functional independence with mobility by performin. Supine to sit with Modified [place]  2. Sit to supine with Modified [place]  3. Sit to stand transfer with Modified [place]  4. Gait  x 100 feet with Supervision using LRAD  5. Lower extremity exercise program x15 reps per handout, with independence                     History:     Past Medical History:   Diagnosis Date    Biliary obstruction     Cancer     Gallbladder    Cholangiocarcinoma     Constipation     Cyst of breast, left, benign solitary     Diverticulosis     Duodenal mass 2016    Duodenal stenosis     Helicobacter pylori gastritis     Hiatal hernia     Hx of  colonic polyp     Hypertension     Tobacco abuse 10/9/2019       Past Surgical History:   Procedure Laterality Date    APPENDECTOMY      CHOLANGIOGRAM N/A 12/26/2019    Procedure: CHOLANGIOGRAM;  Surgeon: Michelle Surgeon;  Location: [facility];  Service: Anesthesiology;  Laterality: N/A;    CHOLANGIOGRAM N/A 2/21/2020    Procedure: CHOLANGIOGRAM;  Surgeon: Michelle Surgeon;  Location: [facility];  Service: Anesthesiology;  Laterality: N/A;  189 anesthesia 2.5 hours     colon polyps      COLONOSCOPY      ENDOSCOPIC ULTRASOUND OF UPPER GASTROINTESTINAL TRACT N/A 10/25/2019    Procedure: ULTRASOUND, UPPER GI TRACT, ENDOSCOPIC;  Surgeon: Luis Antonio Lu MD;  Location: [facility];  Service: Endoscopy;  Laterality: N/A;    ERCP N/A 10/21/2019    Procedure: ERCP (ENDOSCOPIC RETROGRADE CHOLANGIOPANCREATOGRAPHY);  Surgeon: Luis Antonio Lu MD;  Location: [facility];  Service: Endoscopy;  Laterality: N/A;    ERCP N/A 12/17/2019    Procedure: ERCP (ENDOSCOPIC RETROGRADE CHOLANGIOPANCREATOGRAPHY);  Surgeon: Monse Rayo MD;  Location: [facility];  Service: Endoscopy;  Laterality: N/A;    ERCP N/A 12/24/2019    Procedure: ERCP (ENDOSCOPIC RETROGRADE CHOLANGIOPANCREATOGRAPHY);  Surgeon: Monse Rayo MD;  Location: [facility] ([address] [place] [address]);  Service: Endoscopy;  Laterality: N/A;  Will need duodenal stent as well as permanent biliary stents (likely 6 x 8 and 6 x 10 epic stents).  Dr Arnav Rayo    ERCP W/ PLASTIC STENT PLACEMENT  10/21/2019    ESOPHAGOGASTRODUODENOSCOPY N/A 10/25/2019    Procedure: EGD (ESOPHAGOGASTRODUODENOSCOPY);  Surgeon: Luis Antonio Lu MD;  Location: [facility];  Service: Endoscopy;  Laterality: N/A;       Time Tracking:     PT Received On: 02/24/20  PT Start Time: 1009     PT Stop Time: 1027  PT Total Time (min): 18 min     Billable Minutes: Evaluation 10 and Gait Training 8      Orlando Moreno, PT  02/24/2020

## 2020-02-25 LAB
ALBUMIN SERPL BCP-MCNC: 2.6 G/DL (ref 3.5–5.2)
ALP SERPL-CCNC: 254 U/L (ref 55–135)
ALT SERPL W/O P-5'-P-CCNC: 26 U/L (ref 10–44)
ANION GAP SERPL CALC-SCNC: 7 MMOL/L (ref 8–16)
AST SERPL-CCNC: 16 U/L (ref 10–40)
BASOPHILS # BLD AUTO: 0.06 K/UL (ref 0–0.2)
BASOPHILS NFR BLD: 0.8 % (ref 0–1.9)
BILIRUB SERPL-MCNC: 0.6 MG/DL (ref 0.1–1)
BUN SERPL-MCNC: 8 MG/DL (ref 8–23)
CALCIUM SERPL-MCNC: 8.1 MG/DL (ref 8.7–10.5)
CHLORIDE SERPL-SCNC: 101 MMOL/L (ref 95–110)
CO2 SERPL-SCNC: 28 MMOL/L (ref 23–29)
CREAT SERPL-MCNC: 0.6 MG/DL (ref 0.5–1.4)
DIFFERENTIAL METHOD: ABNORMAL
EOSINOPHIL # BLD AUTO: 0.1 K/UL (ref 0–0.5)
EOSINOPHIL NFR BLD: 1.8 % (ref 0–8)
ERYTHROCYTE [DISTWIDTH] IN BLOOD BY AUTOMATED COUNT: 16 % (ref 11.5–14.5)
EST. GFR  (AFRICAN AMERICAN): >60 ML/MIN/1.73 M^2
EST. GFR  (NON AFRICAN AMERICAN): >60 ML/MIN/1.73 M^2
GLUCOSE SERPL-MCNC: 81 MG/DL (ref 70–110)
HCT VFR BLD AUTO: 33.5 % (ref 37–48.5)
HGB BLD-MCNC: 10.6 G/DL (ref 12–16)
IMM GRANULOCYTES # BLD AUTO: 0.02 K/UL (ref 0–0.04)
IMM GRANULOCYTES NFR BLD AUTO: 0.3 % (ref 0–0.5)
LYMPHOCYTES # BLD AUTO: 1.7 K/UL (ref 1–4.8)
LYMPHOCYTES NFR BLD: 22.6 % (ref 18–48)
MAGNESIUM SERPL-MCNC: 1.8 MG/DL (ref 1.6–2.6)
MCH RBC QN AUTO: 29.4 PG (ref 27–31)
MCHC RBC AUTO-ENTMCNC: 31.6 G/DL (ref 32–36)
MCV RBC AUTO: 93 FL (ref 82–98)
MONOCYTES # BLD AUTO: 0.8 K/UL (ref 0.3–1)
MONOCYTES NFR BLD: 11.2 % (ref 4–15)
NEUTROPHILS # BLD AUTO: 4.7 K/UL (ref 1.8–7.7)
NEUTROPHILS NFR BLD: 63.3 % (ref 38–73)
NRBC BLD-RTO: 0 /100 WBC
PHOSPHATE SERPL-MCNC: 3.3 MG/DL (ref 2.7–4.5)
PLATELET # BLD AUTO: 224 K/UL (ref 150–350)
PMV BLD AUTO: 10.6 FL (ref 9.2–12.9)
POTASSIUM SERPL-SCNC: 3.4 MMOL/L (ref 3.5–5.1)
PROT SERPL-MCNC: 5.8 G/DL (ref 6–8.4)
RBC # BLD AUTO: 3.6 M/UL (ref 4–5.4)
SODIUM SERPL-SCNC: 136 MMOL/L (ref 136–145)
WBC # BLD AUTO: 7.4 K/UL (ref 3.9–12.7)

## 2020-02-25 PROCEDURE — 36415 COLL VENOUS BLD VENIPUNCTURE: CPT | Mod: HCNC

## 2020-02-25 PROCEDURE — 99233 SBSQ HOSP IP/OBS HIGH 50: CPT | Mod: HCNC,GC,, | Performed by: HOSPITALIST

## 2020-02-25 PROCEDURE — 93005 ELECTROCARDIOGRAM TRACING: CPT | Mod: HCNC

## 2020-02-25 PROCEDURE — 84100 ASSAY OF PHOSPHORUS: CPT | Mod: HCNC

## 2020-02-25 PROCEDURE — 99233 PR SUBSEQUENT HOSPITAL CARE,LEVL III: ICD-10-PCS | Mod: HCNC,GC,, | Performed by: HOSPITALIST

## 2020-02-25 PROCEDURE — 85025 COMPLETE CBC W/AUTO DIFF WBC: CPT | Mod: HCNC

## 2020-02-25 PROCEDURE — 93010 EKG 12-LEAD: ICD-10-PCS | Mod: HCNC,,, | Performed by: INTERNAL MEDICINE

## 2020-02-25 PROCEDURE — 93010 ELECTROCARDIOGRAM REPORT: CPT | Mod: HCNC,,, | Performed by: INTERNAL MEDICINE

## 2020-02-25 PROCEDURE — 80053 COMPREHEN METABOLIC PANEL: CPT | Mod: HCNC

## 2020-02-25 PROCEDURE — 83735 ASSAY OF MAGNESIUM: CPT | Mod: HCNC

## 2020-02-25 PROCEDURE — 94761 N-INVAS EAR/PLS OXIMETRY MLT: CPT | Mod: HCNC

## 2020-02-25 PROCEDURE — 11000001 HC ACUTE MED/SURG PRIVATE ROOM: Mod: HCNC

## 2020-02-25 PROCEDURE — 25000003 PHARM REV CODE 250: Mod: HCNC | Performed by: STUDENT IN AN ORGANIZED HEALTH CARE EDUCATION/TRAINING PROGRAM

## 2020-02-25 RX ADMIN — DOCUSATE SODIUM - SENNOSIDES 1 TABLET: 50; 8.6 TABLET, FILM COATED ORAL at 10:02

## 2020-02-25 RX ADMIN — PANTOPRAZOLE SODIUM 40 MG: 40 TABLET, DELAYED RELEASE ORAL at 10:02

## 2020-02-25 RX ADMIN — VITAMIN D, TAB 1000IU (100/BT) 1000 UNITS: 25 TAB at 10:02

## 2020-02-25 RX ADMIN — OXYCODONE HYDROCHLORIDE 5 MG: 5 TABLET ORAL at 10:02

## 2020-02-25 RX ADMIN — AMOXICILLIN AND CLAVULANATE POTASSIUM 1 TABLET: 875; 125 TABLET, FILM COATED ORAL at 10:02

## 2020-02-25 RX ADMIN — POLYETHYLENE GLYCOL 3350 17 G: 17 POWDER, FOR SOLUTION ORAL at 10:02

## 2020-02-25 NOTE — SUBJECTIVE & OBJECTIVE
Interval History: Evaluated by IR yesterday who stated that cholangitis on presentation was likely due to stent manipulation and not failure. Plan for choleangiogram tomorrow.    Review of Systems   Constitutional: Positive for activity change and appetite change. Negative for chills and fever.   HENT: Negative for congestion and rhinorrhea.    Respiratory: Negative for cough, chest tightness and shortness of breath.    Cardiovascular: Negative for chest pain, palpitations and leg swelling.   Gastrointestinal: Negative for abdominal distention, abdominal pain, blood in stool, constipation, diarrhea, nausea and vomiting.   Genitourinary: Negative for difficulty urinating and dysuria.   Musculoskeletal: Negative for back pain, gait problem and myalgias.   Neurological: Positive for weakness. Negative for light-headedness and headaches.   Psychiatric/Behavioral: Negative for confusion.     Objective:     Vital Signs (Most Recent):  Temp: 98.3 °F (36.8 °C) (02/25/20 0825)  Pulse: 67 (02/25/20 0825)  Resp: 14 (02/25/20 0825)  BP: (!) 119/57 (02/25/20 0825)  SpO2: 95 % (02/25/20 0825) Vital Signs (24h Range):  Temp:  [98.3 °F (36.8 °C)-98.9 °F (37.2 °C)] 98.3 °F (36.8 °C)  Pulse:  [67-78] 67  Resp:  [14-18] 14  SpO2:  [93 %-98 %] 95 %  BP: (113-133)/(54-62) 119/57     Weight: 80.1 kg (176 lb 9.4 oz)  Body mass index is 23.95 kg/m².    Intake/Output Summary (Last 24 hours) at 2/25/2020 1338  Last data filed at 2/25/2020 0414  Gross per 24 hour   Intake 240 ml   Output 270 ml   Net -30 ml      Physical Exam   Constitutional: She is oriented to person, place, and time. She appears well-developed and well-nourished. No distress.   HENT:   Head: Normocephalic and atraumatic.   Eyes: No scleral icterus.   Neck: Normal range of motion. Neck supple.   Cardiovascular: Normal rate, regular rhythm and normal heart sounds.   Pulmonary/Chest: Effort normal and breath sounds normal. No respiratory distress.   Abdominal: Soft. She  exhibits no distension. There is tenderness (R side abdomen). There is no rebound and no guarding.    External biliary drain in RUQ with small amount of bilious fluid.   Musculoskeletal: She exhibits no edema.   Neurological: She is alert and oriented to person, place, and time.   Skin: Skin is warm and dry. She is not diaphoretic.   Nursing note and vitals reviewed.      Significant Labs:   BMP:   Recent Labs   Lab 02/25/20  0335   GLU 81      K 3.4*      CO2 28   BUN 8   CREATININE 0.6   CALCIUM 8.1*   MG 1.8     CBC:   Recent Labs   Lab 02/24/20  0312 02/25/20  0335   WBC 9.02 7.40   HGB 11.0* 10.6*   HCT 34.6* 33.5*    224

## 2020-02-25 NOTE — ASSESSMENT & PLAN NOTE
75yo F with advanced stage IIIB gallbladder cancer initially diagnosed in 2016 s/p chemo and radiation and complicated by biliary obstruction who presents on 2/22/20 for abd pain, nausea, and vomiting, following IR transhepatic biliary stent exchange on 2/21/20 (she had PTC drain in place). Afebrile, but with new leukocytosis, elevated LFTs, T bili, and lipase. DDx includes cholangitis vs pancreatitis. Due to recent instrumentation with attempt to internalize biliary drainage, cholangitis is most likely. There may be mild pancreatic inflammation, but primary problem most likely cholangitis.     -  Due to clinical and laboratory improvements on 2/23, will de-escalate ABX from CTX/Flagyl to Augmentin   - Blood cultures NGTD  - Reevaluated by IR on 2/24, recommending cholangiogram on 2/26 to assess stent patency  - Advance diet as tolerated  - Continue home PPI

## 2020-02-25 NOTE — ASSESSMENT & PLAN NOTE
QTc on . No cardiac or arrhythmia hx. Asymptomatic. On prn ODT Zofran 8mg outpatient without issue.    - low dose prn zofran  - 2/23 EKG similar to 2/22, QTc remains stable, 533

## 2020-02-25 NOTE — PROGRESS NOTES
[facility] Hospital Medicine  Progress Note    Patient Name: Angelica Tomlinson  MRN: [id number]  Patient Class: IP- Inpatient   Admission Date: 2/22/2020  Length of Stay: 3 days  Attending Physician: Monse Cormier MD  Primary Care Provider: Miguel Barroso MD    [facility] Medicine Team: [facility] HOSP MED 3 Milagros Donovan MD    Subjective:     Principal Problem:Cholangitis      HPI:  Ms. Tomlinson is a 73yo F with HTN (on Amlodipine previously, but stopped by PCP 2/2 low/normal BP) and advanced stage IIIB gallbladder cancer initially diagnosed in 2016 s/p chemo and radiation and complicated by biliary obstruction who presents on 2/22/20 for abd pain, nausea, and vomiting, following IR transhepatic biliary stent exchange on 2/21/20 (she had PTC drain in place). At time of procedure, trial of internal drainage by turning off external drain. Procedure was uncomplicated and she was d/c same day. Since getting home from the procedure, she reports abdominal pain (worse than normal cancer-related pain) N/V, with multiple episodes of bilious, non-bloody emesis. Denies fevers but reports chills. Denies CP, palpitations, SOB, diarrhea. Last BM was Wednesday. She takes occasional prn ODT Zofran at home with moderate relief in nausea (although she has not taken it since recent procedure). Her usual pain med regimen includes fentanyl patch q72h (current patch was applied 2/21) and po oxycodone. Her normal pain regimen has not been sufficient for her current abd pain.    She was admitted in 12/2019 with similar presentation and was treated for cholangitis, discharged on augmentin. She was diagnosed with gallbladder cancer in 2016 and reports one surgery where they were not able to resect the tumor. Oncology f/u with [facility] per care everywhere documents ([facility] per the pt, [facility] per notes from prior admission). She last received chemo approx 1mo ago but it had to be stopped due to her weakness.     Overview/Hospital  Course:  Pt admitted for leukocytosis, hyponatremia, & elevated LFTs & T bili. US abdomen showed no biliary duct dilation. Pt started on cipro & flagyl for possible cholangitis. IR had turned off exterior biliary drain on 2/21 in an attempt to trial internal drainage but was unsucessful- consulted IR again & they reopened external drain. Per IR, there is concern that the cholangitis on presentation was due to possible stent manipulation and not failure of the stent. Plan for cholangiogram on 2/26.    Interval History: Evaluated by IR yesterday who stated that cholangitis on presentation was likely due to stent manipulation and not failure. Plan for choleangiogram tomorrow.    Review of Systems   Constitutional: Positive for activity change and appetite change. Negative for chills and fever.   HENT: Negative for congestion and rhinorrhea.    Respiratory: Negative for cough, chest tightness and shortness of breath.    Cardiovascular: Negative for chest pain, palpitations and leg swelling.   Gastrointestinal: Negative for abdominal distention, abdominal pain, blood in stool, constipation, diarrhea, nausea and vomiting.   Genitourinary: Negative for difficulty urinating and dysuria.   Musculoskeletal: Negative for back pain, gait problem and myalgias.   Neurological: Positive for weakness. Negative for light-headedness and headaches.   Psychiatric/Behavioral: Negative for confusion.     Objective:     Vital Signs (Most Recent):  Temp: 98.3 °F (36.8 °C) (02/25/20 0825)  Pulse: 67 (02/25/20 0825)  Resp: 14 (02/25/20 0825)  BP: (!) 119/57 (02/25/20 0825)  SpO2: 95 % (02/25/20 0825) Vital Signs (24h Range):  Temp:  [98.3 °F (36.8 °C)-98.9 °F (37.2 °C)] 98.3 °F (36.8 °C)  Pulse:  [67-78] 67  Resp:  [14-18] 14  SpO2:  [93 %-98 %] 95 %  BP: (113-133)/(54-62) 119/57     Weight: 80.1 kg (176 lb 9.4 oz)  Body mass index is 23.95 kg/m².    Intake/Output Summary (Last 24 hours) at 2/25/2020 1338  Last data filed at 2/25/2020  0414  Gross per 24 hour   Intake 240 ml   Output 270 ml   Net -30 ml      Physical Exam   Constitutional: She is oriented to person, place, and time. She appears well-developed and well-nourished. No distress.   HENT:   Head: Normocephalic and atraumatic.   Eyes: No scleral icterus.   Neck: Normal range of motion. Neck supple.   Cardiovascular: Normal rate, regular rhythm and normal heart sounds.   Pulmonary/Chest: Effort normal and breath sounds normal. No respiratory distress.   Abdominal: Soft. She exhibits no distension. There is tenderness (R side abdomen). There is no rebound and no guarding.    External biliary drain in RUQ with small amount of bilious fluid.   Musculoskeletal: She exhibits no edema.   Neurological: She is alert and oriented to person, place, and time.   Skin: Skin is warm and dry. She is not diaphoretic.   Nursing note and vitals reviewed.      Significant Labs:   BMP:   Recent Labs   Lab 02/25/20  0335   GLU 81      K 3.4*      CO2 28   BUN 8   CREATININE 0.6   CALCIUM 8.1*   MG 1.8     CBC:   Recent Labs   Lab 02/24/20  0312 02/25/20  0335   WBC 9.02 7.40   HGB 11.0* 10.6*   HCT 34.6* 33.5*    224       Assessment/Plan:      * Cholangitis  73yo F with advanced stage IIIB gallbladder cancer initially diagnosed in 2016 s/p chemo and radiation and complicated by biliary obstruction who presents on 2/22/20 for abd pain, nausea, and vomiting, following IR transhepatic biliary stent exchange on 2/21/20 (she had PTC drain in place). Afebrile, but with new leukocytosis, elevated LFTs, T bili, and lipase. DDx includes cholangitis vs pancreatitis. Due to recent instrumentation with attempt to internalize biliary drainage, cholangitis is most likely. There may be mild pancreatic inflammation, but primary problem most likely cholangitis.     -  Due to clinical and laboratory improvements on 2/23, will de-escalate ABX from CTX/Flagyl to Augmentin   - Blood cultures NGTD  -  Reevaluated by IR on 2/24, recommending cholangiogram on 2/26 to assess stent patency  - Advance diet as tolerated  - Continue home PPI      Hyponatremia  Mild hyponatremia on admission, 131. Likely hypovolemic 2/2 poor po intake and multiple episodes of vomiting. Asymptomatic.    - Improved on 2/23 (134) s/p IVF and increasing po intake  - Trend daily       Vitamin D deficiency  - Continue home vit D supplementation  -Vitamin D held on 2/25    Discharge planning issues  - PT/OT consulted for discharge planning  - D/C 2/25 on 3 more days of augmentin      Prolonged Q-T interval on ECG  QTc on . No cardiac or arrhythmia hx. Asymptomatic. On prn ODT Zofran 8mg outpatient without issue.    - low dose prn zofran  - 2/23 EKG similar to 2/22, QTc remains stable, 533    Cancer related pain  Home pain med regimen includes Fentanyl patch 50mcg q72hrs and prn oxycodone (5-15mg depending on pain). New fentanyl patch placed 2/21 after IR procedure, and pt has the patch on upon admission. Oxycodone at home was not effective for current pain. IV Dilaudid 0.5mg given in the ED with relief.    - Continue home fentanyl patch q72h, next due to be changed Monday 2/24  - Bowel regimen, scheduled    HTN (hypertension)  Previously on Amlodipine 10mg, but recently stopped by her PCP due to normal BP on home monitoring. Normotensive on admission.    - Continue to monitor, no need for anti-HTN presently      Gallbladder cancer  Stage IIIB gallbladder cancer on palliative chemo with mFOLFOX6 at [facility], last chemo approx 1mo ago. Pt reports chemo was stopped 2/2 her weakness/debility.    - Lovenox VTE PPX  - f/u with outpatient Oncologist upon discharge           VTE Risk Mitigation (From admission, onward)         Ordered     IP VTE HIGH RISK PATIENT  Once      02/22/20 0700                      Milagros Donovan MD  Department of Hospital Medicine   [facility]

## 2020-02-26 LAB
ALBUMIN SERPL BCP-MCNC: 2.6 G/DL (ref 3.5–5.2)
ALP SERPL-CCNC: 267 U/L (ref 55–135)
ALT SERPL W/O P-5'-P-CCNC: 24 U/L (ref 10–44)
ANION GAP SERPL CALC-SCNC: 9 MMOL/L (ref 8–16)
AST SERPL-CCNC: 20 U/L (ref 10–40)
BASOPHILS # BLD AUTO: 0.05 K/UL (ref 0–0.2)
BASOPHILS NFR BLD: 0.7 % (ref 0–1.9)
BILIRUB SERPL-MCNC: 0.4 MG/DL (ref 0.1–1)
BUN SERPL-MCNC: 4 MG/DL (ref 8–23)
CALCIUM SERPL-MCNC: 8.3 MG/DL (ref 8.7–10.5)
CHLORIDE SERPL-SCNC: 103 MMOL/L (ref 95–110)
CO2 SERPL-SCNC: 25 MMOL/L (ref 23–29)
CREAT SERPL-MCNC: 0.6 MG/DL (ref 0.5–1.4)
DIFFERENTIAL METHOD: ABNORMAL
EOSINOPHIL # BLD AUTO: 0.2 K/UL (ref 0–0.5)
EOSINOPHIL NFR BLD: 2.4 % (ref 0–8)
ERYTHROCYTE [DISTWIDTH] IN BLOOD BY AUTOMATED COUNT: 16 % (ref 11.5–14.5)
EST. GFR  (AFRICAN AMERICAN): >60 ML/MIN/1.73 M^2
EST. GFR  (NON AFRICAN AMERICAN): >60 ML/MIN/1.73 M^2
GLUCOSE SERPL-MCNC: 93 MG/DL (ref 70–110)
HCT VFR BLD AUTO: 34.6 % (ref 37–48.5)
HGB BLD-MCNC: 11 G/DL (ref 12–16)
IMM GRANULOCYTES # BLD AUTO: 0.02 K/UL (ref 0–0.04)
IMM GRANULOCYTES NFR BLD AUTO: 0.3 % (ref 0–0.5)
LYMPHOCYTES # BLD AUTO: 2 K/UL (ref 1–4.8)
LYMPHOCYTES NFR BLD: 27.3 % (ref 18–48)
MAGNESIUM SERPL-MCNC: 1.8 MG/DL (ref 1.6–2.6)
MCH RBC QN AUTO: 29.6 PG (ref 27–31)
MCHC RBC AUTO-ENTMCNC: 31.8 G/DL (ref 32–36)
MCV RBC AUTO: 93 FL (ref 82–98)
MONOCYTES # BLD AUTO: 0.8 K/UL (ref 0.3–1)
MONOCYTES NFR BLD: 11.4 % (ref 4–15)
NEUTROPHILS # BLD AUTO: 4.3 K/UL (ref 1.8–7.7)
NEUTROPHILS NFR BLD: 57.9 % (ref 38–73)
NRBC BLD-RTO: 0 /100 WBC
PHOSPHATE SERPL-MCNC: 3.6 MG/DL (ref 2.7–4.5)
PLATELET # BLD AUTO: 246 K/UL (ref 150–350)
PMV BLD AUTO: 10.3 FL (ref 9.2–12.9)
POTASSIUM SERPL-SCNC: 3.7 MMOL/L (ref 3.5–5.1)
PROT SERPL-MCNC: 5.8 G/DL (ref 6–8.4)
RBC # BLD AUTO: 3.72 M/UL (ref 4–5.4)
SODIUM SERPL-SCNC: 137 MMOL/L (ref 136–145)
WBC # BLD AUTO: 7.37 K/UL (ref 3.9–12.7)

## 2020-02-26 PROCEDURE — 83735 ASSAY OF MAGNESIUM: CPT | Mod: HCNC

## 2020-02-26 PROCEDURE — 36415 COLL VENOUS BLD VENIPUNCTURE: CPT | Mod: HCNC

## 2020-02-26 PROCEDURE — 80053 COMPREHEN METABOLIC PANEL: CPT | Mod: HCNC

## 2020-02-26 PROCEDURE — 25500020 PHARM REV CODE 255: Mod: HCNC | Performed by: HOSPITALIST

## 2020-02-26 PROCEDURE — 11000001 HC ACUTE MED/SURG PRIVATE ROOM: Mod: HCNC

## 2020-02-26 PROCEDURE — 63600175 PHARM REV CODE 636 W HCPCS: Mod: HCNC | Performed by: RADIOLOGY

## 2020-02-26 PROCEDURE — 99232 SBSQ HOSP IP/OBS MODERATE 35: CPT | Mod: HCNC,GC,, | Performed by: HOSPITALIST

## 2020-02-26 PROCEDURE — 25000003 PHARM REV CODE 250: Mod: HCNC | Performed by: STUDENT IN AN ORGANIZED HEALTH CARE EDUCATION/TRAINING PROGRAM

## 2020-02-26 PROCEDURE — 84100 ASSAY OF PHOSPHORUS: CPT | Mod: HCNC

## 2020-02-26 PROCEDURE — 97535 SELF CARE MNGMENT TRAINING: CPT | Mod: HCNC

## 2020-02-26 PROCEDURE — 85025 COMPLETE CBC W/AUTO DIFF WBC: CPT | Mod: HCNC

## 2020-02-26 PROCEDURE — 99232 PR SUBSEQUENT HOSPITAL CARE,LEVL II: ICD-10-PCS | Mod: HCNC,GC,, | Performed by: HOSPITALIST

## 2020-02-26 RX ORDER — SODIUM CHLORIDE 9 MG/ML
INJECTION, SOLUTION INTRAVENOUS
Status: COMPLETED | OUTPATIENT
Start: 2020-02-26 | End: 2020-02-26

## 2020-02-26 RX ORDER — LIDOCAINE HYDROCHLORIDE 5 MG/ML
INJECTION, SOLUTION INFILTRATION; PERINEURAL CODE/TRAUMA/SEDATION MEDICATION
Status: COMPLETED | OUTPATIENT
Start: 2020-02-26 | End: 2020-02-26

## 2020-02-26 RX ORDER — AMOXICILLIN AND CLAVULANATE POTASSIUM 875; 125 MG/1; MG/1
1 TABLET, FILM COATED ORAL 2 TIMES DAILY
Qty: 6 TABLET | Refills: 0 | Status: SHIPPED | OUTPATIENT
Start: 2020-02-26 | End: 2020-03-01

## 2020-02-26 RX ORDER — FENTANYL CITRATE 50 UG/ML
INJECTION, SOLUTION INTRAMUSCULAR; INTRAVENOUS CODE/TRAUMA/SEDATION MEDICATION
Status: COMPLETED | OUTPATIENT
Start: 2020-02-26 | End: 2020-02-26

## 2020-02-26 RX ADMIN — SODIUM CHLORIDE 500 ML: 0.9 INJECTION, SOLUTION INTRAVENOUS at 09:02

## 2020-02-26 RX ADMIN — ONDANSETRON 4 MG: 4 TABLET, ORALLY DISINTEGRATING ORAL at 09:02

## 2020-02-26 RX ADMIN — AMOXICILLIN AND CLAVULANATE POTASSIUM 1 TABLET: 875; 125 TABLET, FILM COATED ORAL at 09:02

## 2020-02-26 RX ADMIN — OXYCODONE HYDROCHLORIDE 5 MG: 5 TABLET ORAL at 09:02

## 2020-02-26 RX ADMIN — IOHEXOL 15 ML: 300 INJECTION, SOLUTION INTRAVENOUS at 09:02

## 2020-02-26 RX ADMIN — POLYETHYLENE GLYCOL 3350 17 G: 17 POWDER, FOR SOLUTION ORAL at 11:02

## 2020-02-26 RX ADMIN — DOCUSATE SODIUM - SENNOSIDES 1 TABLET: 50; 8.6 TABLET, FILM COATED ORAL at 09:02

## 2020-02-26 RX ADMIN — DOCUSATE SODIUM - SENNOSIDES 1 TABLET: 50; 8.6 TABLET, FILM COATED ORAL at 11:02

## 2020-02-26 RX ADMIN — LIDOCAINE HYDROCHLORIDE 5 ML: 5 INJECTION, SOLUTION INFILTRATION; PERINEURAL at 09:02

## 2020-02-26 RX ADMIN — FENTANYL CITRATE 50 MCG: 50 INJECTION, SOLUTION INTRAMUSCULAR; INTRAVENOUS at 09:02

## 2020-02-26 RX ADMIN — Medication 6 MG: at 09:02

## 2020-02-26 RX ADMIN — OXYCODONE HYDROCHLORIDE 5 MG: 5 TABLET ORAL at 08:02

## 2020-02-26 RX ADMIN — PANTOPRAZOLE SODIUM 40 MG: 40 TABLET, DELAYED RELEASE ORAL at 11:02

## 2020-02-26 NOTE — PLAN OF CARE
Biliary stent removed and replaced. Pt tolerated procedure well. NAD noted or reported. Site bandaged, CDI. Pt to recover in ROCU, report given at the bedside. Report to primary nurse called.

## 2020-02-26 NOTE — ASSESSMENT & PLAN NOTE
Mild hyponatremia on admission, 131. Likely hypovolemic 2/2 poor po intake and multiple episodes of vomiting. Asymptomatic.    - Improved on 2/23 (134) s/p IVF and increasing po intake  - Trend daily     RESOLVED

## 2020-02-26 NOTE — ASSESSMENT & PLAN NOTE
Stage IIIB gallbladder cancer on palliative chemo with mFOLFOX6 at [facility], last chemo approx 1mo ago. Pt reports chemo was stopped 2/2 her weakness/debility.    - Lovenox VTE PPX  - f/u with outpatient Oncologist upon discharge

## 2020-02-26 NOTE — PROGRESS NOTES
[facility] Hospital Medicine  Progress Note    Patient Name: Angelica Tomlinson  MRN: [id number]  Patient Class: IP- Inpatient   Admission Date: 2/22/2020  Length of Stay: 4 days  Attending Physician: Monse Cormier MD  Primary Care Provider: Miguel Barroso MD    [facility] Medicine Team: [facility] HOSP MED 3 Sue Saldana MD    Subjective:     Principal Problem:Cholangitis        HPI:  Ms. Tomlinson is a 75yo F with HTN (on Amlodipine previously, but stopped by PCP 2/2 low/normal BP) and advanced stage IIIB gallbladder cancer initially diagnosed in 2016 s/p chemo and radiation and complicated by biliary obstruction who presents on 2/22/20 for abd pain, nausea, and vomiting, following IR transhepatic biliary stent exchange on 2/21/20 (she had PTC drain in place). At time of procedure, trial of internal drainage by turning off external drain. Procedure was uncomplicated and she was d/c same day. Since getting home from the procedure, she reports abdominal pain (worse than normal cancer-related pain) N/V, with multiple episodes of bilious, non-bloody emesis. Denies fevers but reports chills. Denies CP, palpitations, SOB, diarrhea. Last BM was Wednesday. She takes occasional prn ODT Zofran at home with moderate relief in nausea (although she has not taken it since recent procedure). Her usual pain med regimen includes fentanyl patch q72h (current patch was applied 2/21) and po oxycodone. Her normal pain regimen has not been sufficient for her current abd pain.    She was admitted in 12/2019 with similar presentation and was treated for cholangitis, discharged on augmentin. She was diagnosed with gallbladder cancer in 2016 and reports one surgery where they were not able to resect the tumor. [place]cology f[place] [facility] [place] care everywhere documents ([facility] per the pt, [facility] per notes from prior admission). She last received chemo approx 1mo ago but it had to be stopped due to her weakness.     Overview/Hospital  Course:  Pt admitted for leukocytosis, hyponatremia, & elevated LFTs & T bili. US abdomen showed no biliary duct dilation. Pt started on cipro & flagyl for possible cholangitis. IR had turned off exterior biliary drain on 2/21 in an attempt to trial internal drainage but was unsucessful- consulted IR again & they reopened external drain. Per IR, there is concern that the cholangitis on presentation was due to possible stent manipulation and not failure of the stent. Cholangiogram on 2/26 showed patent stent but pre-existing biliary drain was difficult to aspirate/flush so IR exchanged for a new 10.2-Fr external biliary drainage catheter. Pt tolerated well & plan for discharge tomorrow.     Interval History: pt had cholangiogram w/ biliary drain exchange this morning.     Review of Systems   Constitutional: Positive for activity change and appetite change. Negative for chills and fever.   HENT: Negative for congestion and rhinorrhea.    Respiratory: Negative for cough, chest tightness and shortness of breath.    Cardiovascular: Negative for chest pain, palpitations and leg swelling.   Gastrointestinal: Positive for abdominal pain (RUQ around drain). Negative for abdominal distention, blood in stool, constipation, diarrhea, nausea and vomiting.   Genitourinary: Negative for difficulty urinating and dysuria.   Musculoskeletal: Negative for back pain, gait problem and myalgias.   Neurological: Positive for weakness. Negative for light-headedness and headaches.   Psychiatric/Behavioral: Negative for confusion.     Objective:     Vital Signs (Most Recent):  Temp: 97.3 °F (36.3 °C) (02/26/20 1225)  Pulse: 63 (02/26/20 1225)  Resp: 14 (02/26/20 1225)  BP: (!) 144/65 (02/26/20 1225)  SpO2: 99 % (02/26/20 1225) Vital Signs (24h Range):  Temp:  [97.3 °F (36.3 °C)-98.8 °F (37.1 °C)] 97.3 °F (36.3 °C)  Pulse:  [60-75] 63  Resp:  [12-20] 14  SpO2:  [96 %-100 %] 99 %  BP: (122-185)/(57-79) 144/65     Weight: 80.1 kg (176 lb 9.4  oz)  Body mass index is 23.95 kg/m².    Intake/Output Summary (Last 24 hours) at 2/26/2020 1226  Last data filed at 2/26/2020 0600  Gross per 24 hour   Intake 480 ml   Output 430 ml   Net 50 ml      Physical Exam   Constitutional: She is oriented to person, place, and time. She appears well-developed and well-nourished. No distress.   HENT:   Head: Normocephalic and atraumatic.   Eyes: No scleral icterus.   Neck: Normal range of motion. Neck supple.   Cardiovascular: Normal rate, regular rhythm and normal heart sounds.   Pulmonary/Chest: Effort normal and breath sounds normal. No respiratory distress.   Abdominal: Soft. She exhibits no distension. There is tenderness (R side abdomen). There is no rebound and no guarding.    External biliary drain in RUQ with small amount of bilious fluid.   Musculoskeletal: She exhibits no edema.   Neurological: She is alert and oriented to person, place, and time.   Skin: Skin is warm and dry. She is not diaphoretic.   Nursing note and vitals reviewed.            Assessment/Plan:      * Cholangitis  73yo F with advanced stage IIIB gallbladder cancer initially diagnosed in 2016 s/p chemo and radiation and complicated by biliary obstruction who presents on 2/22/20 for abd pain, nausea, and vomiting, following IR transhepatic biliary stent exchange on 2/21/20 (she had PTC drain in place). Afebrile, but with new leukocytosis, elevated LFTs, T bili, and lipase. DDx includes cholangitis vs pancreatitis. Due to recent instrumentation with attempt to internalize biliary drainage, cholangitis is most likely. There may be mild pancreatic inflammation, but primary problem most likely cholangitis.     -  Due to clinical and laboratory improvements on 2/23, will de-escalate ABX from CTX/Flagyl to Augmentin   - Blood cultures NGTD  - Reevaluated by IR on 2/24- did cholangiogram on 2/26 that showed patent stent & they exchanged external biliary drain  - outpt IR f/u in 1 week  - continue full  diet  - Continue home PPI      Sepsis  BCx NGTD      Hyponatremia  Mild hyponatremia on admission, 131. Likely hypovolemic 2/2 poor po intake and multiple episodes of vomiting. Asymptomatic.    - Improved on 2/23 (134) s/p IVF and increasing po intake  - Trend daily     RESOLVED    Vitamin D deficiency  - Continue home vit D supplementation      Discharge planning issues  - PT/OT consulted for discharge planning  - D/C planned for 2/27 on 2 more days of augmentin      Prolonged Q-T interval on ECG  QTc on . No cardiac or arrhythmia hx. Asymptomatic. On prn ODT Zofran 8mg outpatient without issue.    - low dose prn zofran  - 2/23 EKG similar to 2/22, QTc remains stable, 533    Cancer related pain  Home pain med regimen includes Fentanyl patch 50mcg q72hrs and prn oxycodone (5-15mg depending on pain). New fentanyl patch placed 2/21 after IR procedure, and pt has the patch on upon admission. Oxycodone at home was not effective for current pain. IV Dilaudid 0.5mg given in the ED with relief.    - Continue home fentanyl patch q72h  - Bowel regimen, scheduled    HTN (hypertension)  Previously on Amlodipine 10mg, but recently stopped by her PCP due to normal BP on home monitoring. Normotensive on admission.    - Continue to monitor, no need for anti-HTN presently      Gallbladder cancer  Stage IIIB gallbladder cancer on palliative chemo with mFOLFOX6 at [facility], last chemo approx 1mo ago. Pt reports chemo was stopped 2/2 her weakness/debility.    - Lovenox VTE PPX  - f/u with outpatient Oncologist upon discharge           VTE Risk Mitigation (From admission, onward)         Ordered     IP VTE HIGH RISK PATIENT  Once      02/22/20 0700                      Sue Saldana MD  Department of Hospital Medicine   [facility]

## 2020-02-26 NOTE — PT/OT/SLP PROGRESS
Occupational Therapy   Treatment    Name: Angelica Tomlinson  MRN: [id number]  Admitting Diagnosis:  Cholangitis       Recommendations:     Discharge Recommendations: home with home health  Discharge Equipment Recommendations:  none  Barriers to discharge:  None    Assessment:     Angelica Tomlinson is a 74 y.o. female with a medical diagnosis of Cholangitis.  Patient met all goals and has no acute OT needs at this level of care. Still recommending HHOT to reduce burden of care and ensure safety in home environment once medically appropriate for discharge.      Plan:     · Patient to be discharged from acute OT services. Re-consult if situation changes.     Subjective     Pain/Comfort:  · Pain Rating 1: 0/10  · Pain Rating Post-Intervention 1: 0/10    Objective:     Communicated with: NSG prior to session.  Patient found HOB elevated upon OT entry to room.    General Precautions: Standard, fall   Orthopedic Precautions:N/A   Braces: N/A     Occupational Performance:     Bed Mobility:    · Patient completed Supine to Sit with independence  · Patient completed Sit to Supine with independence     Functional Mobility/Transfers:  · Patient completed Sit <> Stand Transfer with independence  with  no assistive device   · Patient completed Toilet Transfer bed<>toilet with functional ambulation technique with modified independence with  no AD    Activities of Daily Living:  · Grooming: independence standing at sink to wash and dry hands  · Upper Body Dressing: independence Donning and doffing robe standing   · Lower Body Dressing: independence Donning slippers  · Toileting: independence simulating task during toilet transfer and per patient report      [facility] 6 Click ADL: 24    Treatment & Education:  Role of OT and POC  ADL retraining  Safety  Functional mobility training  Discharge planning    Patient left HOB elevated with call button in reach, spouse present and all needs met. Education:      GOALS:   Multidisciplinary  Problems     Occupational Therapy Goals     Not on file          Multidisciplinary Problems (Resolved)        Problem: Occupational Therapy Goal    Goal Priority Disciplines Outcome Interventions   Occupational Therapy Goal   (Resolved)     OT, PT/OT Met    Description:  Goals to be met by: 3/15    Patient will increase functional independence with ADLs by performing:    UE Dressing with [place]. Met  LE Dressing with [place]. Met  Grooming while standing with [place]. Met  Toileting from toilet with [place] for hygiene and clothing management. Met                       Time Tracking:     OT Date of Treatment: 02/26/20  OT Start Time: 1143  OT Stop Time: 1153  OT Total Time (min): 10 min    Billable Minutes:Self Care/Home Management 10    CHEYANNE Mcclain  2/26/2020

## 2020-02-26 NOTE — PHARMACY MED REC
"Admission Medication Reconciliation - Pharmacy Consult Note    The home medication history was taken by Jesika Puga, pharmacy technician.  Based on information gathered and subsequent review by the clinical pharmacist, the items below may need attention.    You may go to "Admission" then "Reconcile Home Medications" tabs to review and/or act upon these items.      No issues noted with the medication reconciliation.      Please address this information as you see fit.  Feel free to contact us if you have any questions or require assistance.  Hoa Jarvis  EXT [phone]   .    .          "

## 2020-02-26 NOTE — ASSESSMENT & PLAN NOTE
75yo F with advanced stage IIIB gallbladder cancer initially diagnosed in 2016 s/p chemo and radiation and complicated by biliary obstruction who presents on 2/22/20 for abd pain, nausea, and vomiting, following IR transhepatic biliary stent exchange on 2/21/20 (she had PTC drain in place). Afebrile, but with new leukocytosis, elevated LFTs, T bili, and lipase. DDx includes cholangitis vs pancreatitis. Due to recent instrumentation with attempt to internalize biliary drainage, cholangitis is most likely. There may be mild pancreatic inflammation, but primary problem most likely cholangitis.     -  Due to clinical and laboratory improvements on 2/23, will de-escalate ABX from CTX/Flagyl to Augmentin   - Blood cultures NGTD  - Reevaluated by IR on 2/24- did cholangiogram on 2/26 that showed patent stent & they exchanged external biliary drain  - outpt IR f/u in 1 week  - continue full diet  - Continue home PPI

## 2020-02-26 NOTE — NURSING
Patient remained free from falls and injuries. She walked the halls with her . VSS. She is alert and oriented x 4, and calm.

## 2020-02-26 NOTE — PLAN OF CARE
Problem: Occupational Therapy Goal  Goal: Occupational Therapy Goal  Description  Goals to be met by: 3/15    Patient will increase functional independence with ADLs by performing:    UE Dressing with [place]. Met  LE Dressing with [place]. Met  Grooming while standing with [place]. Met  Toileting from toilet with [place] for hygiene and clothing management. Met      Outcome: Met  Patient met all goals and has no other acute needs at this time at this level of care. Re-consult if situation changes.   CHEYANNE Mcclain  2/26/2020     yes

## 2020-02-26 NOTE — NURSING
Pt arrives to  for biliary tube check. Pt is AAOX4, able to state reason she is here. Orders, hx, labs, consent reviewed.

## 2020-02-26 NOTE — BRIEF OP NOTE
Radiology Post-Procedure Note    Pre Op Diagnosis: Acute cholangitis with concerns for re-occlusion of CBD and indwelling stent  Post Op Diagnosis: Same    Procedure: 1. Fluoroscopic-guided cholangiogram  2. Fluoroscopic-guided exchange for a new 10.2-Fr external biliary drain    Procedure performed by: Blake Mujica MD, KEELEYR    Written Informed Consent Obtained: Yes  Specimen Removed: NO  Estimated Blood Loss: none    Findings:   Cholangiogram shows decompressed biliary system suggesting continued patency of CBD stent. Subsequently, spontaneous antegrade flow of contrast through CBD and CBD stent without significant resistance to flow with good biliary-enteric transit time of contrast is noted. CBD stent remains widely patent without evidence of recurrent narrowing.     Pre-existing biliary drain difficult to aspirate and flush with partial patency s/p exchange for a new 10.2-Fr external biliary drainage catheter.\    Patient tolerated the procedure well. No immediate post-procedural complications noted. Thank you for considering IR for the care of your patient.     Blake Mujica MD  Interventional Radiology

## 2020-02-26 NOTE — SUBJECTIVE & OBJECTIVE
Interval History: pt had cholangiogram w/ biliary drain exchange this morning.     Review of Systems   Constitutional: Positive for activity change and appetite change. Negative for chills and fever.   HENT: Negative for congestion and rhinorrhea.    Respiratory: Negative for cough, chest tightness and shortness of breath.    Cardiovascular: Negative for chest pain, palpitations and leg swelling.   Gastrointestinal: Positive for abdominal pain (RUQ around drain). Negative for abdominal distention, blood in stool, constipation, diarrhea, nausea and vomiting.   Genitourinary: Negative for difficulty urinating and dysuria.   Musculoskeletal: Negative for back pain, gait problem and myalgias.   Neurological: Positive for weakness. Negative for light-headedness and headaches.   Psychiatric/Behavioral: Negative for confusion.     Objective:     Vital Signs (Most Recent):  Temp: 97.3 °F (36.3 °C) (02/26/20 1225)  Pulse: 63 (02/26/20 1225)  Resp: 14 (02/26/20 1225)  BP: (!) 144/65 (02/26/20 1225)  SpO2: 99 % (02/26/20 1225) Vital Signs (24h Range):  Temp:  [97.3 °F (36.3 °C)-98.8 °F (37.1 °C)] 97.3 °F (36.3 °C)  Pulse:  [60-75] 63  Resp:  [12-20] 14  SpO2:  [96 %-100 %] 99 %  BP: (122-185)/(57-79) 144/65     Weight: 80.1 kg (176 lb 9.4 oz)  Body mass index is 23.95 kg/m².    Intake/Output Summary (Last 24 hours) at 2/26/2020 1226  Last data filed at 2/26/2020 0600  Gross per 24 hour   Intake 480 ml   Output 430 ml   Net 50 ml      Physical Exam   Constitutional: She is oriented to person, place, and time. She appears well-developed and well-nourished. No distress.   HENT:   Head: Normocephalic and atraumatic.   Eyes: No scleral icterus.   Neck: Normal range of motion. Neck supple.   Cardiovascular: Normal rate, regular rhythm and normal heart sounds.   Pulmonary/Chest: Effort normal and breath sounds normal. No respiratory distress.   Abdominal: Soft. She exhibits no distension. There is tenderness (R side abdomen). There  is no rebound and no guarding.    External biliary drain in RUQ with small amount of bilious fluid.   Musculoskeletal: She exhibits no edema.   Neurological: She is alert and oriented to person, place, and time.   Skin: Skin is warm and dry. She is not diaphoretic.   Nursing note and vitals reviewed.

## 2020-02-26 NOTE — PLAN OF CARE
02/26/20 0923   Post-Acute Status   Post-Acute Authorization Other   Other Status No Post-Acute Service Needs

## 2020-02-26 NOTE — ASSESSMENT & PLAN NOTE
Home pain med regimen includes Fentanyl patch 50mcg q72hrs and prn oxycodone (5-15mg depending on pain). New fentanyl patch placed 2/21 after IR procedure, and pt has the patch on upon admission. Oxycodone at home was not effective for current pain. IV Dilaudid 0.5mg given in the ED with relief.    - Continue home fentanyl patch q72h  - Bowel regimen, scheduled

## 2020-02-26 NOTE — H&P
Inpatient Radiology Pre-procedure Note    History of Present Illness:  Angelica Tomlinson is a 74 y.o. female with pertinent PMHx of  Advanced stage IIIB gallbladder CA complicated by distal CBD obstuction s/p placement of an external biliary drainage catheter on 12/26/29 2/2 inability to cross near occlusive CBD narrowing with subsequent placement of a 10-mm BMS across the CBD narrowing on 2/21/20.     Pt now presents with evidence of acute cholangitis concerning for failure of biliary stent/recurrent CBD occlusion requiring evaluation of the biliary stent for patency with possible intervention.     Admission H&P reviewed.  Past Medical History:   Diagnosis Date    Biliary obstruction     Cancer     Gallbladder    Cholangiocarcinoma     Constipation     Cyst of breast, left, benign solitary     Diverticulosis     Duodenal mass 5/16/2016    Duodenal stenosis     Helicobacter pylori gastritis     Hiatal hernia     Hx of colonic polyp     Hypertension     Tobacco abuse 10/9/2019     Past Surgical History:   Procedure Laterality Date    APPENDECTOMY      CHOLANGIOGRAM N/A 12/26/2019    Procedure: CHOLANGIOGRAM;  Surgeon: Michelle Surgeon;  Location: [facility];  Service: Anesthesiology;  Laterality: N/A;    CHOLANGIOGRAM N/A 2/21/2020    Procedure: CHOLANGIOGRAM;  Surgeon: Michelle Surgeon;  Location: [facility];  Service: Anesthesiology;  Laterality: N/A;  189 anesthesia 2.5 hours     colon polyps      COLONOSCOPY      ENDOSCOPIC ULTRASOUND OF UPPER GASTROINTESTINAL TRACT N/A 10/25/2019    Procedure: ULTRASOUND, UPPER GI TRACT, ENDOSCOPIC;  Surgeon: Luis Antonio Lu MD;  Location: [facility];  Service: Endoscopy;  Laterality: N/A;    ERCP N/A 10/21/2019    Procedure: ERCP (ENDOSCOPIC RETROGRADE CHOLANGIOPANCREATOGRAPHY);  Surgeon: Luis Antonio Lu MD;  Location: [facility] ENDO;  Service: Endoscopy;  Laterality: N/A;    ERCP N/A 12/17/2019    Procedure: ERCP (ENDOSCOPIC RETROGRADE CHOLANGIOPANCREATOGRAPHY);  Surgeon:  Monse Rayo MD;  Location: [facility] ENDO;  Service: Endoscopy;  Laterality: N/A;    ERCP N/A 12/24/2019    Procedure: ERCP (ENDOSCOPIC RETROGRADE CHOLANGIOPANCREATOGRAPHY);  Surgeon: Monse Rayo MD;  Location: [facility] ENDO (2ND FLR);  Service: Endoscopy;  Laterality: N/A;  Will need duodenal stent as well as permanent biliary stents (likely 6 x 8 and 6 x 10 epic stents).  Dr Arnav Rayo    ERCP W/ PLASTIC STENT PLACEMENT  10/21/2019    ESOPHAGOGASTRODUODENOSCOPY N/A 10/25/2019    Procedure: EGD (ESOPHAGOGASTRODUODENOSCOPY);  Surgeon: Luis Antonio Lu MD;  Location: [facility] ENDO;  Service: Endoscopy;  Laterality: N/A;       Review of Systems:   As documented in primary team H&P    Home Meds:   Prior to Admission medications    Medication Sig Start Date End Date Taking? Authorizing Provider   amLODIPine (NORVASC) 10 MG tablet Take 1 tablet (10 mg total) by mouth once daily.  Patient taking differently: Take 10 mg by mouth daily as needed (hypertension).  10/9/19  Yes Miguel Barroso MD   docusate sodium (COLACE) 100 MG capsule Take 100-300 mg by mouth as needed for Constipation.   Yes Historical Provider, MD   fentaNYL (DURAGESIC) 50 mcg/hr Place 1 patch onto the skin every 72 hours.  1/27/20  Yes Historical Provider, MD   omeprazole (PRILOSEC) 20 MG capsule Take 20 mg by mouth daily as needed (acid reflux).  10/10/19  Yes Historical Provider, MD   ondansetron (ZOFRAN-ODT) 8 MG TbDL Take 8 mg by mouth daily as needed (nausea and vomiting).  11/5/19 11/4/20 Yes Historical Provider, MD   oxyCODONE (ROXICODONE) 10 mg Tab immediate release tablet Take 5-10 mg by mouth as needed for Pain.  2/13/20  Yes Historical Provider, MD   polyethylene glycol (MIRALAX) 17 gram PwPk Take 17 g by mouth every morning.    Yes Historical Provider, MD   aspirin 81 MG Chew Take 81 mg by mouth once daily.  2/26/16   Historical Provider, MD   dexAMETHasone (DECADRON) 4 MG Tab Take 1/2 tablet by mouth with food in the  morning and 1/2  tablet by mouth with food in the evening. 11/5/19   Historical Provider, MD   oxyCODONE (ROXICODONE) 5 MG immediate release tablet Take 1 tablet (5 mg total) by mouth every 4 (four) hours as needed for Pain. 2/21/20   Bradley Rebolledo MD   vitamin D 1000 units Tab Take 185 mg by mouth once daily.    Historical Provider, MD     Scheduled Meds:    amoxicillin-clavulanate 875-125mg  1 tablet Oral BID    fentaNYL  1 patch Transdermal Q72H    pantoprazole  40 mg Oral Daily    polyethylene glycol  17 g Oral Daily    senna-docusate 8.6-50 mg  1 tablet Oral BID     Continuous Infusions:   PRN Meds:Dextrose 10% Bolus, Dextrose 10% Bolus, glucagon (human recombinant), glucose, glucose, melatonin, ondansetron, oxyCODONE, sodium chloride 0.9%     Anticoagulants/Antiplatelets: no anticoagulation    Allergies: Review of patient's allergies indicates:  No Known Allergies     Sedation Hx: have not been any systemic reactions    Labs:  Recent Labs   Lab 02/21/20  0850   INR 1.0       Recent Labs   Lab 02/26/20  0338   WBC 7.37   HGB 11.0*   HCT 34.6*   MCV 93         Recent Labs   Lab 02/26/20  0338   GLU 93      K 3.7      CO2 25   BUN 4*   CREATININE 0.6   CALCIUM 8.3*   MG 1.8   ALT 24   AST 20   ALBUMIN 2.6*   BILITOT 0.4     Vitals:  Temp: 98.2 °F (36.8 °C) (02/26/20 0440)  Pulse: 73 (02/26/20 0440)  Resp: 20 (02/26/20 0440)  BP: (!) 122/58 (02/26/20 0440)  SpO2: 96 % (02/26/20 0440)     Physical Exam:  ASA: 2  Mallampati: 2    General: no acute distress  Mental Status: alert and oriented to person, place and time  HEENT: normocephalic, atraumatic  Chest: unlabored breathing  Heart: regular heart rate  Abdomen: nondistended  Extremity: moves all extremities    A/P:   74 y.o. female with advanced stage IIIB gallbladder CA admitted with evidence of acute cholangitis with concerns of failure of indwelling CBD biliary stent/recurrent CBD narrowing.     1. Acute cholangitis with indwelling CBD  stent - Will attempt cholangiogram and evaluation of CBD stent with potential intervention and replacement/exchange of pre-existing external biliary drainage catheter under moderate conscious sedation.     Risks (including, but not limited to, pain, bleeding, infection, damage to nearby structures, failure to obtain sufficient material for a diagnosis, the need for additional procedures, and death), benefits, and alternatives were discussed with the patient. All questions were answered to the best of my abilities. The patient wishes to proceed with the procedure. Written informed consent was obtained.    Thank you for considering IR for the care of your patient.     Blake Mujica MD, DABR  Interventional Radiology    Bradley Rebolledo MD  PGY-II Radiology

## 2020-02-27 VITALS
TEMPERATURE: 98 F | HEIGHT: 72 IN | RESPIRATION RATE: 17 BRPM | DIASTOLIC BLOOD PRESSURE: 57 MMHG | OXYGEN SATURATION: 97 % | HEART RATE: 67 BPM | BODY MASS INDEX: 23.92 KG/M2 | WEIGHT: 176.56 LBS | SYSTOLIC BLOOD PRESSURE: 117 MMHG

## 2020-02-27 LAB
ALBUMIN SERPL BCP-MCNC: 2.5 G/DL (ref 3.5–5.2)
ALP SERPL-CCNC: 277 U/L (ref 55–135)
ALT SERPL W/O P-5'-P-CCNC: 23 U/L (ref 10–44)
ANION GAP SERPL CALC-SCNC: 5 MMOL/L (ref 8–16)
AST SERPL-CCNC: 22 U/L (ref 10–40)
BACTERIA BLD CULT: NORMAL
BACTERIA BLD CULT: NORMAL
BASOPHILS # BLD AUTO: 0.05 K/UL (ref 0–0.2)
BASOPHILS NFR BLD: 0.7 % (ref 0–1.9)
BILIRUB SERPL-MCNC: 0.4 MG/DL (ref 0.1–1)
BUN SERPL-MCNC: 5 MG/DL (ref 8–23)
CALCIUM SERPL-MCNC: 8.7 MG/DL (ref 8.7–10.5)
CHLORIDE SERPL-SCNC: 104 MMOL/L (ref 95–110)
CO2 SERPL-SCNC: 27 MMOL/L (ref 23–29)
CREAT SERPL-MCNC: 0.6 MG/DL (ref 0.5–1.4)
DIFFERENTIAL METHOD: ABNORMAL
EOSINOPHIL # BLD AUTO: 0.2 K/UL (ref 0–0.5)
EOSINOPHIL NFR BLD: 2.3 % (ref 0–8)
ERYTHROCYTE [DISTWIDTH] IN BLOOD BY AUTOMATED COUNT: 16 % (ref 11.5–14.5)
EST. GFR  (AFRICAN AMERICAN): >60 ML/MIN/1.73 M^2
EST. GFR  (NON AFRICAN AMERICAN): >60 ML/MIN/1.73 M^2
GLUCOSE SERPL-MCNC: 80 MG/DL (ref 70–110)
HCT VFR BLD AUTO: 34.6 % (ref 37–48.5)
HGB BLD-MCNC: 11.3 G/DL (ref 12–16)
IMM GRANULOCYTES # BLD AUTO: 0.04 K/UL (ref 0–0.04)
IMM GRANULOCYTES NFR BLD AUTO: 0.6 % (ref 0–0.5)
LYMPHOCYTES # BLD AUTO: 2 K/UL (ref 1–4.8)
LYMPHOCYTES NFR BLD: 27.5 % (ref 18–48)
MAGNESIUM SERPL-MCNC: 1.7 MG/DL (ref 1.6–2.6)
MCH RBC QN AUTO: 30.2 PG (ref 27–31)
MCHC RBC AUTO-ENTMCNC: 32.7 G/DL (ref 32–36)
MCV RBC AUTO: 93 FL (ref 82–98)
MONOCYTES # BLD AUTO: 0.8 K/UL (ref 0.3–1)
MONOCYTES NFR BLD: 11.4 % (ref 4–15)
NEUTROPHILS # BLD AUTO: 4.2 K/UL (ref 1.8–7.7)
NEUTROPHILS NFR BLD: 57.5 % (ref 38–73)
NRBC BLD-RTO: 0 /100 WBC
PHOSPHATE SERPL-MCNC: 3.6 MG/DL (ref 2.7–4.5)
PLATELET # BLD AUTO: 230 K/UL (ref 150–350)
PMV BLD AUTO: 10.4 FL (ref 9.2–12.9)
POTASSIUM SERPL-SCNC: 4.3 MMOL/L (ref 3.5–5.1)
PROT SERPL-MCNC: 5.7 G/DL (ref 6–8.4)
RBC # BLD AUTO: 3.74 M/UL (ref 4–5.4)
SODIUM SERPL-SCNC: 136 MMOL/L (ref 136–145)
WBC # BLD AUTO: 7.27 K/UL (ref 3.9–12.7)

## 2020-02-27 PROCEDURE — 97116 GAIT TRAINING THERAPY: CPT | Mod: HCNC

## 2020-02-27 PROCEDURE — 83735 ASSAY OF MAGNESIUM: CPT | Mod: HCNC

## 2020-02-27 PROCEDURE — 84100 ASSAY OF PHOSPHORUS: CPT | Mod: HCNC

## 2020-02-27 PROCEDURE — 94761 N-INVAS EAR/PLS OXIMETRY MLT: CPT | Mod: HCNC

## 2020-02-27 PROCEDURE — 25000003 PHARM REV CODE 250: Mod: HCNC | Performed by: STUDENT IN AN ORGANIZED HEALTH CARE EDUCATION/TRAINING PROGRAM

## 2020-02-27 PROCEDURE — 36415 COLL VENOUS BLD VENIPUNCTURE: CPT | Mod: HCNC

## 2020-02-27 PROCEDURE — 85025 COMPLETE CBC W/AUTO DIFF WBC: CPT | Mod: HCNC

## 2020-02-27 PROCEDURE — 99238 HOSP IP/OBS DSCHRG MGMT 30/<: CPT | Mod: HCNC,GC,, | Performed by: HOSPITALIST

## 2020-02-27 PROCEDURE — 80053 COMPREHEN METABOLIC PANEL: CPT | Mod: HCNC

## 2020-02-27 PROCEDURE — 99238 PR HOSPITAL DISCHARGE DAY,<30 MIN: ICD-10-PCS | Mod: HCNC,GC,, | Performed by: HOSPITALIST

## 2020-02-27 RX ADMIN — OXYCODONE HYDROCHLORIDE 5 MG: 5 TABLET ORAL at 09:02

## 2020-02-27 RX ADMIN — DOCUSATE SODIUM - SENNOSIDES 1 TABLET: 50; 8.6 TABLET, FILM COATED ORAL at 09:02

## 2020-02-27 RX ADMIN — FENTANYL 1 PATCH: 50 PATCH, EXTENDED RELEASE TRANSDERMAL at 09:02

## 2020-02-27 RX ADMIN — AMOXICILLIN AND CLAVULANATE POTASSIUM 1 TABLET: 875; 125 TABLET, FILM COATED ORAL at 09:02

## 2020-02-27 RX ADMIN — POLYETHYLENE GLYCOL 3350 17 G: 17 POWDER, FOR SOLUTION ORAL at 09:02

## 2020-02-27 RX ADMIN — PANTOPRAZOLE SODIUM 40 MG: 40 TABLET, DELAYED RELEASE ORAL at 09:02

## 2020-02-27 NOTE — NURSING
Patient remained free from falls or injuries. VSS. AAO x 4. Patient had procedure to replace biliary stent today; tolerated procedure well, per IR. She remained comfortable in bed for the rest of the day, spent time napping.

## 2020-02-27 NOTE — PLAN OF CARE
Left message with Interventional Radiology Clinic to schedule a one week follow up appointment for patient.

## 2020-02-27 NOTE — PLAN OF CARE
02/27/20 0845   Post-Acute Status   Post-Acute Authorization Other   Other Status No Post-Acute Service Needs

## 2020-02-27 NOTE — DISCHARGE SUMMARY
[facility] Hospital Medicine  Discharge Summary      Patient Name: Angelica Tomlinson  MRN: [id number]  Admission Date: 2/22/2020  Hospital Length of Stay: 5 days  Discharge Date and Time:  02/27/2020 9:24 AM  Attending Physician: Monse Cormier MD   Discharging Provider: Sue Saldana MD  Primary Care Provider: Miguel Barroso MD  [facility] Medicine Team: [facility] Sue Saldana MD    HPI:   Ms. Tomlinson is a 73yo F with HTN (on Amlodipine previously, but stopped by PCP 2/2 low/normal BP) and advanced stage IIIB gallbladder cancer initially diagnosed in 2016 s/p chemo and radiation and complicated by biliary obstruction who presents on 2/22/20 for abd pain, nausea, and vomiting, following IR transhepatic biliary stent exchange on 2/21/20 (she had PTC drain in place). At time of procedure, trial of internal drainage by turning off external drain. Procedure was uncomplicated and she was d/c same day. Since getting home from the procedure, she reports abdominal pain (worse than normal cancer-related pain) N/V, with multiple episodes of bilious, non-bloody emesis. Denies fevers but reports chills. Denies CP, palpitations, SOB, diarrhea. Last BM was Wednesday. She takes occasional prn ODT Zofran at home with moderate relief in nausea (although she has not taken it since recent procedure). Her usual pain med regimen includes fentanyl patch q72h (current patch was applied 2/21) and po oxycodone. Her normal pain regimen has not been sufficient for her current abd pain.    She was admitted in 12/2019 with similar presentation and was treated for cholangitis, discharged on augmentin. She was diagnosed with gallbladder cancer in 2016 and reports one surgery where they were not able to resect the tumor. Oncology f/u with [facility] per care everywhere documents (Brien Villaseñor per the pt, [facility] per notes from prior admission). She last received chemo approx 1mo ago but it had to be stopped due to her weakness.      * No surgery found *      Hospital Course:   Pt admitted for leukocytosis, hyponatremia, & elevated LFTs & T bili. US abdomen showed no biliary duct dilation. Pt started on cipro & flagyl for possible cholangitis. IR had turned off exterior biliary drain on 2/21 in an attempt to trial internal drainage but was unsucessful- consulted IR again & they reopened external drain. Per IR, there is concern that the cholangitis on presentation was due to possible stent manipulation and not failure of the stent. Cholangiogram on 2/26 showed patent stent but pre-existing biliary drain was difficult to aspirate/flush so IR exchanged for a new 10.2-Fr external biliary drainage catheter. Pt tolerated procedure well & is stable for discharge on 2/27.     Review of Systems   Constitutional:  Negative for chills and fever, appetite and activity change.   HENT: Negative for congestion and rhinorrhea.    Respiratory: Negative for cough, chest tightness and shortness of breath.    Cardiovascular: Negative for chest pain, palpitations and leg swelling.   Gastrointestinal: Negative for abdominal pain Negative for abdominal distention, blood in stool, constipation, diarrhea, nausea and vomiting.   Genitourinary: Negative for difficulty urinating and dysuria.   Musculoskeletal: Negative for back pain, gait problem and myalgias.   Neurological: Positive for weakness. Negative for light-headedness and headaches.   Psychiatric/Behavioral: Negative for confusion    Physical Exam   Constitutional: She is oriented to person, place, and time. She appears well-developed and well-nourished. No distress.   HENT:   Head: Normocephalic and atraumatic.   Eyes: No scleral icterus.   Neck: Normal range of motion. Neck supple.   Cardiovascular: Normal rate, regular rhythm and normal heart sounds.   Pulmonary/Chest: Effort normal and breath sounds normal. No respiratory distress.   Abdominal: Soft. She exhibits no distension. There is epigastric  tenderness. There is no rebound and no guarding.   Musculoskeletal: She exhibits no edema.   Neurological: She is alert and oriented to person, place, and time.   Skin: Skin is warm and dry. She is not diaphoretic.   Nursing note and vitals reviewed.    Consults:     * Cholangitis  73yo F with advanced stage IIIB gallbladder cancer initially diagnosed in 2016 s/p chemo and radiation and complicated by biliary obstruction who presents on 2/22/20 for abd pain, nausea, and vomiting, following IR transhepatic biliary stent exchange on 2/21/20 (she had PTC drain in place). Afebrile, but with new leukocytosis, elevated LFTs, T bili, and lipase. DDx includes cholangitis vs pancreatitis. Due to recent instrumentation with attempt to internalize biliary drainage, cholangitis is most likely. There may be mild pancreatic inflammation, but primary problem most likely cholangitis.     -  Pt's abx were deescalated to augmentin  - Blood cultures NGTD  - Reevaluated by IR on 2/24- did cholangiogram on 2/26 that showed patent stent & they exchanged external biliary drain  - outpt IR f/u in 1 week  - continue full diet  - Continue home PPI      HTN (hypertension)  Previously on Amlodipine 10mg, but recently stopped by her PCP due to normal BP on home monitoring. Normotensive on admission.    - Continue to monitor, no need for anti-HTN presently      Gallbladder cancer  Stage IIIB gallbladder cancer on palliative [place] with [id number] [facility], last chemo approx 1mo ago. Pt reports chemo was stopped 2/2 her weakness/debility.    - Lovenox VTE PPX  - f/u with outpatient Oncologist upon discharge           Final Active Diagnoses:    Diagnosis Date Noted POA    PRINCIPAL PROBLEM:  Cholangitis [K83.09] 12/24/2019 Yes    Prolonged Q-T interval on ECG [R94.31] 02/22/2020 Yes    Discharge planning issues [Z02.9] 02/22/2020 Not Applicable    Vitamin D deficiency [E55.9] 02/22/2020 Yes    Hyponatremia [E87.1] 02/22/2020 Yes    Sepsis  [A41.9] 02/22/2020 Yes    Cancer related pain [G89.3] 12/25/2019 Yes    HTN (hypertension) [I10] 10/18/2019 Yes    Gallbladder cancer [C23] 10/09/2019 Yes      Problems Resolved During this Admission:       Discharged Condition: stable    Disposition: Home-Health Care [facility]    Follow Up:  Follow-up Information     Kasi Ackerman - Carlo Barnes.    Specialty:  Interventional Radiology  Why:  Left message for clinic to call for appointment needed in one week.  Contact information:  Emily Ackerman  [facility] [address] [phone]  Additional information:  [facility], [address]           [facility] In 1 week.    Specialty:  Hematology and Oncology  Contact information:  Emily Ackerman  [facility] [id number] [phone]               Patient Instructions:      Ambulatory referral/consult to Interventional Radiology   Standing Status: Future   Referral Priority: Routine Referral Type: Consultation   Referral Reason: Specialty Services Required   Requested Specialty: Interventional Radiology   Number of Visits Requested: 1       Pending Diagnostic Studies:     None         Medications:  Reconciled Home Medications:      Medication List      START taking these medications    amoxicillin-clavulanate 875-125mg 875-125 mg per tablet  Commonly known as:  AUGMENTIN  Take 1 tablet by mouth 2 (two) times daily. for 3 days        CHANGE how you take these medications    oxyCODONE 5 MG immediate release tablet  Commonly known as:  ROXICODONE  Take 1 tablet (5 mg total) by mouth every 4 (four) hours as needed for Pain.  What changed:  Another medication with the same name was removed. Continue taking this medication, and follow the directions you see here.        CONTINUE taking these medications    aspirin 81 MG Chew  Take 81 mg by mouth once daily.     fentaNYL 50 mcg/hr  Commonly known as:  DURAGESIC  Place 1 patch onto the skin every 72 hours.     Miralax 17 gram Pwpk  Generic  drug:  polyethylene glycol  Take 17 g by mouth every morning.     omeprazole 20 MG capsule  Commonly known as:  PRILOSEC  Take 20 mg by mouth daily as needed (acid reflux).     ondansetron 8 MG Tbdl  Commonly known as:  ZOFRAN-ODT  Take 8 mg by mouth daily as needed (nausea and vomiting).     vitamin D 1000 units Tab  Commonly known as:  VITAMIN D3  Take 185 mg by mouth once daily.        STOP taking these medications    amLODIPine 10 MG tablet  Commonly known as:  NORVASC     dexAMETHasone 4 MG Tab  Commonly known as:  DECADRON     docusate sodium 100 MG capsule  Commonly known as:  COLACE            Indwelling Lines/Drains at time of discharge:   Lines/Drains/Airways     Drain                 Drain/Device  -- days         Biliary Tube 02/26/20 0937 [email or web address] slip-coat 8 Fr. RUQ less than 1 day                Time spent on the discharge of patient: 35 minutes  Patient was seen and examined on the date of discharge and determined to be suitable for discharge.         Sue Saldana MD  Department of Hospital Medicine  [facility]

## 2020-02-27 NOTE — PT/OT/SLP PROGRESS
Physical Therapy Treatment and Discharge    Patient Name:  Angelica Tomlinson   MRN:  [id number]    Recommendations:     Discharge Recommendations:  home   Discharge Equipment Recommendations: none   Barriers to discharge: None    Assessment:     Angelica Tomlinson is a 74 y.o. female admitted with a medical diagnosis of Cholangitis.  She presents with the following impairments/functional limitations:  weakness, impaired functional mobilty, gait instability, impaired endurance, impaired balance, impaired self care skills. Pt tolerated session well performing all mobility (I) and is safe to discharge from acute therapy with no further therapy needs.     Recent Surgery: * No surgery found *      Plan:     During this hospitalization, patient to be seen 2 x/week to address the identified rehab impairments via gait training, therapeutic activities, therapeutic exercises, neuromuscular re-education and progress toward the following goals:    · Plan of Care Expires:  03/24/20    Subjective     Chief Complaint: none noted   Patient/Family Comments/goals: Pt pleasant and willing to participate with therapy on this date.    Pain/Comfort:  · Pain Rating Post-Intervention 1: 0/10      Objective:     Communicated with NSG prior to session.  Patient found supine with peripheral IV, telemetry upon PT entry to room.     General Precautions: Standard, fall   Orthopedic Precautions:N/A   Braces: N/A     Functional Mobility:  · Bed Mobility:     · Supine to Sit: independence  · Sit to Supine: independence  · Transfers:     · Sit to Stand:  independence with no AD  · Gait: 215ft (I)  · Balance: (I)      AM-PAC 6 CLICK MOBILITY  Turning over in bed (including adjusting bedclothes, sheets and blankets)?: 4  Sitting down on and standing up from a chair with arms (e.g., wheelchair, bedside commode, etc.): 4  Moving from lying on back to sitting on the side of the bed?: 4  Moving to and from a bed to a chair (including a wheelchair)?:  4  Need to walk in hospital room?: 4  Climbing 3-5 steps with a railing?: 4  Basic Mobility Total Score: 24       Therapeutic Activities and Exercises:   - Pt educated on:   -PT roles, expectations, and POC    -Safety with mobility   -Benefits of OOB activities to increase strength and functional mobility    -Performing ther ex for increasing LE ROM and strength   -Discharge recommendations     Patient left supine with call button in reach..    GOALS:   Multidisciplinary Problems     Physical Therapy Goals        Problem: Physical Therapy Goal    Goal Priority Disciplines Outcome Goal Variances Interventions   Physical Therapy Goal     PT, PT/OT Ongoing, Progressing     Description:  Goals to be met by: 3/24/2020    Patient will increase functional independence with mobility by performin. Supine to sit with Modified B[place]so[place]  2. Sit to supine with Modified B[place]  3. Sit to stand transfer with Modified [place]  4. Gait  x 100 feet with Supervision using LRAD  5. Lower extremity exercise program x15 reps per handout, with independence                     Time Tracking:     PT Received On: 20  PT Start Time: 1039     PT Stop Time: 1047  PT Total Time (min): 8 min     Billable Minutes: Gait Training 8    Treatment Type: Treatment  PT/PTA: PT           Orlando Moreno, PT  2020

## 2020-02-27 NOTE — PROGRESS NOTES
Pt received discharge instructions and acknowledged understanding. IV discontinued with catheter intact. Medications delivered to bedside. Pt transported off unit via wheelchair.

## 2020-02-27 NOTE — ASSESSMENT & PLAN NOTE
Stage IIIB gallbladder cancer on palliative chemo with [id number] at [facility], last chemo approx 1mo ago. Pt reports chemo was stopped 2/2 her weakness/debility.    - Lovenox VTE PPX  - f/u with outpatient Oncologist upon discharge

## 2020-02-27 NOTE — PLAN OF CARE
Patient discharging to home today. Family to transport. Patient does not want home health at this time.       02/27/20 1159   Final Note   Assessment Type Final Discharge Note   Anticipated Discharge Disposition Home  (Patient does not want home health at this time.)   What phone number can be called within the next 1-3 days to see how you are doing after discharge? [phone]   Hospital Follow Up  Appt(s) scheduled? Yes   Discharge plans and expectations educations in teach back method with documentation complete? Yes     Future Appointments   Date Time Provider Department Center   3/10/2020  2:20 PM Miguel Barroso MD [facility] [place]

## 2020-02-27 NOTE — PLAN OF CARE
[facility]    HOME HEALTH ORDERS  FACE TO FACE ENCOUNTER    Patient Name: Angelica Tomlinson  YOB: 1945    PCP: Miguel Barroso MD   PCP Address: [address] / [place] [address]  PCP Phone Number: [phone]  PCP Fax: [phone]    Encounter Date: 02/27/2020    Admit to Home Health    Diagnoses:  Active Hospital Problems    Diagnosis  POA    *Cholangitis [K83.09]  Yes    Prolonged Q-T interval on ECG [R94.31]  Yes    Discharge planning issues [Z02.9]  Not Applicable    Vitamin D deficiency [E55.9]  Yes    Hyponatremia [E87.1]  Yes    Sepsis [A41.9]  Yes    Cancer related pain [G89.3]  Yes    HTN (hypertension) [I10]  Yes    Gallbladder cancer [C23]  Yes      Resolved Hospital Problems   No resolved problems to display.       No future appointments.  Follow-up Information     [facility] - Interventional Rad.    Specialty:  Interventional Radiology  Why:  Left message for clinic to call for appointment needed in one week.  Contact information:  Emily Marino Ackerman  [facility] [address] [phone]  Additional information:  Clinic [place], [address]th Floor           [facility] HEMATOLOGY/ONCOLOGY In 1 week.    Specialty:  Hematology and Oncology  Contact information:  Emily Marino Ackerman  [facility] [address] [phone]                   I have seen and examined this patient face to face today. My clinical findings that support the need for the home health skilled services and home bound status are the following:  Weakness/numbness causing balance and gait disturbance due to Malignancy/Cancer making it taxing to leave home.    Allergies:Review of patient's allergies indicates:  No Known Allergies    Diet: regular diet    Activities: activity as tolerated    Nursing:   SN to complete comprehensive assessment including routine vital signs. Instruct on disease process and s/s of complications to report to MD. Review/verify medication list sent home with the  patient at time of discharge  and instruct patient/caregiver as needed. Frequency may be adjusted depending on start of care date.    Notify MD if SBP > 160 or < 90; DBP > 90 or < 50; HR > 120 or < 50; Temp > 101;     Outpt appointment w/ IR in 1 week   Follow up with oncology outpatient     CONSULTS:    Physical Therapy to evaluate and treat. Evaluate for home safety and equipment needs; Establish/upgrade home exercise program. Perform / instruct on therapeutic exercises, gait training, transfer training, and Range of Motion.  Occupational Therapy to evaluate and treat. Evaluate home environment for safety and equipment needs. Perform/Instruct on transfers, ADL training, ROM, and therapeutic exercises.        WOUND CARE ORDERS  n/a      Medications: Review discharge medications with patient and family and provide education.      Current Discharge Medication List      START taking these medications    Details   amoxicillin-clavulanate 875-125mg (AUGMENTIN) 875-125 mg per tablet Take 1 tablet by mouth 2 (two) times daily. for 3 days  Qty: 6 tablet, Refills: 0         CONTINUE these medications which have NOT CHANGED    Details   fentaNYL (DURAGESIC) 50 mcg/hr Place 1 patch onto the skin every 72 hours.     Comments: Quantity prescribed more than 7 day supply? Press F2 and select one:[id number]        omeprazole (PRILOSEC) 20 MG capsule Take 20 mg by mouth daily as needed (acid reflux).       ondansetron (ZOFRAN-ODT) 8 MG TbDL Take 8 mg by mouth daily as needed (nausea and vomiting).       polyethylene glycol (MIRALAX) 17 gram PwPk Take 17 g by mouth every morning.       aspirin 81 MG Chew Take 81 mg by mouth once daily.       oxyCODONE (ROXICODONE) 5 MG immediate release tablet Take 1 tablet (5 mg total) by mouth every 4 (four) hours as needed for Pain.  Qty: 20 tablet, Refills: 0      vitamin D 1000 units Tab Take 185 mg by mouth once daily.         STOP taking these medications       amLODIPine (NORVASC) 10 MG tablet  Comments:   Reason for Stopping:         docusate sodium (COLACE) 100 MG capsule Comments:   Reason for Stopping:         dexAMETHasone (DECADRON) 4 MG Tab Comments:   Reason for Stopping:               I certify that this patient is confined to her home and needs physical therapy and occupational therapy.

## 2020-02-27 NOTE — ASSESSMENT & PLAN NOTE
75yo F with advanced stage IIIB gallbladder cancer initially diagnosed in 2016 s/p chemo and radiation and complicated by biliary obstruction who presents on 2/22/20 for abd pain, nausea, and vomiting, following IR transhepatic biliary stent exchange on 2/21/20 (she had PTC drain in place). Afebrile, but with new leukocytosis, elevated LFTs, T bili, and lipase. DDx includes cholangitis vs pancreatitis. Due to recent instrumentation with attempt to internalize biliary drainage, cholangitis is most likely. There may be mild pancreatic inflammation, but primary problem most likely cholangitis.     -  Pt's abx were deescalated to augmentin  - Blood cultures NGTD  - Reevaluated by IR on 2/24- did cholangiogram on 2/26 that showed patent stent & they exchanged external biliary drain  - outpt IR f/u in 1 week  - continue full diet  - Continue home PPI

## 2020-03-06 DIAGNOSIS — K83.1 BILIARY TRACT OBSTRUCTION: Primary | ICD-10-CM

## 2020-03-10 ENCOUNTER — HOSPITAL ENCOUNTER (OUTPATIENT)
Dept: INTERVENTIONAL RADIOLOGY/VASCULAR | Facility: HOSPITAL | Age: 75
Discharge: HOME | End: 2020-03-10
Attending: FAMILY MEDICINE
Payer: MEDICARE

## 2020-03-10 VITALS
SYSTOLIC BLOOD PRESSURE: 171 MMHG | HEART RATE: 69 BPM | RESPIRATION RATE: 16 BRPM | OXYGEN SATURATION: 100 % | DIASTOLIC BLOOD PRESSURE: 75 MMHG

## 2020-03-10 DIAGNOSIS — K83.1 BILIARY TRACT OBSTRUCTION: ICD-10-CM

## 2020-03-10 PROCEDURE — 25500020 PHARM REV CODE 255: Mod: HCNC | Performed by: FAMILY MEDICINE

## 2020-03-10 PROCEDURE — 47537 IR CHOLANGIOGRAM THROUGH EXISTING CATH (XPD): ICD-10-PCS | Mod: HCNC,,, | Performed by: RADIOLOGY

## 2020-03-10 PROCEDURE — 47537 REMOVAL BILIARY DRG CATH: CPT | Mod: HCNC

## 2020-03-10 PROCEDURE — 47537 REMOVAL BILIARY DRG CATH: CPT | Mod: HCNC,,, | Performed by: RADIOLOGY

## 2020-03-10 RX ADMIN — IOHEXOL 10 ML: 300 INJECTION, SOLUTION INTRAVENOUS at 02:03

## 2020-03-10 NOTE — PROCEDURES
Radiology Post Procedure Note:     Procedure: Cholangiogram with Tube Removal    (s): Suzanne    Blood Loss: Minimal    Specimen: None    Findings: Patient came to IR And had a cholangiogram performed demonstrating patency of the biliary stent. External biliary drain then removed. Sterile dressing applied.     Levi BERRY M.D. personally reviewed and agree with the above dictated note.

## 2020-03-10 NOTE — PROGRESS NOTES
Procedure complete. Site CDI. VSS. Pt discharged per unit and hospital protocol with family member.

## 2020-03-10 NOTE — DISCHARGE SUMMARY
Radiology Discharge Summary      Hospital Course: No complications    Admit Date: 3/10/2020  Discharge Date: 03/10/2020     Instructions Given to Patient: Yes  Diet: Resume prior diet  Activity: activity as tolerated    Description of Condition on Discharge: Stable  Vital Signs (Most Recent): Pulse: 69 (03/10/20 1435)  Resp: 16 (03/10/20 1435)  BP: (!) 171/75 (03/10/20 1435)  SpO2: 100 % (03/10/20 1435)    Discharge Disposition: Home    Discharge Diagnosis: Biliary Obstruction     Follow-up: with referring    .Levi BERRY M.D. personally reviewed and agree with the above dictated note.

## 2020-03-18 ENCOUNTER — TELEPHONE (OUTPATIENT)
Dept: FAMILY MEDICINE | Facility: CLINIC | Age: 75
End: 2020-03-18

## 2020-03-18 DIAGNOSIS — K21.9 GASTROESOPHAGEAL REFLUX DISEASE, ESOPHAGITIS PRESENCE NOT SPECIFIED: Primary | ICD-10-CM

## 2020-03-18 RX ORDER — OMEPRAZOLE 20 MG/1
20 CAPSULE, DELAYED RELEASE ORAL DAILY PRN
Qty: 30 CAPSULE | Refills: 3 | Status: SHIPPED | OUTPATIENT
Start: 2020-03-18

## 2020-03-18 NOTE — TELEPHONE ENCOUNTER
----- Message from Ayah Henley sent at 3/18/2020 12:05 PM CDT -----  Contact: Angelica [phone]  Type: RX Refill Request    Who Called: Angelica     Refill or New Rx: refill     RX Name and Strength:pantoprazole    Is this a 30 day or 90 day RX:30 day     Preferred Pharmacy with phone number: [facility] - [place], [place] - [address]    Would the patient rather a call back or a response via [facility]? Call back     Best Call Back Number: [phone]

## 2020-03-18 NOTE — TELEPHONE ENCOUNTER
----- Message from Sudheermontybonny Henley sent at 3/18/2020 12:00 PM CDT -----  Contact: Angelica [phone]  Type: Patient Call Back    Who called:Angelica    What is the request in detail: The patient is requesting a call back from the staff. She stated that she has a hospital follow up for today. She thinks she should cancel, due to the outbreak of the covid19. Also the patient stated that she just started chemo as well. She would like an order for a handicap sticker for parking.     Can the clinic reply by [facility]?no    Would the patient rather a call back or a response via [facility]? Call back     Best call back number:[phone]

## 2020-10-19 ENCOUNTER — TELEPHONE (OUTPATIENT)
Dept: ENDOSCOPY | Facility: HOSPITAL | Age: 75
End: 2020-10-19

## 2020-10-19 NOTE — TELEPHONE ENCOUNTER
Called patient regarding setting up a Colonoscopy since she had positive FIT test back on 11/12/2019. No answer and message was left with direct number.